# Patient Record
Sex: FEMALE | Race: WHITE | NOT HISPANIC OR LATINO | Employment: FULL TIME
[De-identification: names, ages, dates, MRNs, and addresses within clinical notes are randomized per-mention and may not be internally consistent; named-entity substitution may affect disease eponyms.]

---

## 2017-03-02 ENCOUNTER — LAB SERVICES (OUTPATIENT)
Dept: OTHER | Age: 67
End: 2017-03-02

## 2017-03-02 ENCOUNTER — CHARTING TRANS (OUTPATIENT)
Dept: OTHER | Age: 67
End: 2017-03-02

## 2017-03-05 ENCOUNTER — CHARTING TRANS (OUTPATIENT)
Dept: OTHER | Age: 67
End: 2017-03-05

## 2017-03-06 LAB — SKIN TEST, TB IN-OFFICE: NEGATIVE

## 2017-03-09 ENCOUNTER — CHARTING TRANS (OUTPATIENT)
Dept: OTHER | Age: 67
End: 2017-03-09

## 2017-03-12 ENCOUNTER — CHARTING TRANS (OUTPATIENT)
Dept: OTHER | Age: 67
End: 2017-03-12

## 2017-03-12 ENCOUNTER — LAB SERVICES (OUTPATIENT)
Dept: OTHER | Age: 67
End: 2017-03-12

## 2017-03-12 LAB — SKIN TEST, TB IN-OFFICE: NEGATIVE

## 2017-09-14 ENCOUNTER — HOSPITAL ENCOUNTER (OUTPATIENT)
Age: 67
Discharge: HOME | End: 2017-09-14
Attending: EMERGENCY MEDICINE
Payer: COMMERCIAL

## 2017-09-14 VITALS
OXYGEN SATURATION: 97 % | DIASTOLIC BLOOD PRESSURE: 67 MMHG | HEART RATE: 80 BPM | SYSTOLIC BLOOD PRESSURE: 137 MMHG | TEMPERATURE: 98 F | RESPIRATION RATE: 16 BRPM

## 2017-09-14 DIAGNOSIS — J06.9 VIRAL URI: Primary | ICD-10-CM

## 2017-09-14 PROCEDURE — 99214 OFFICE O/P EST MOD 30 MIN: CPT

## 2017-09-14 PROCEDURE — 99213 OFFICE O/P EST LOW 20 MIN: CPT

## 2017-09-14 RX ORDER — METHYLPREDNISOLONE 4 MG/1
TABLET ORAL
Qty: 1 PACKAGE | Refills: 0 | Status: SHIPPED | OUTPATIENT
Start: 2017-09-14 | End: 2018-04-09 | Stop reason: ALTCHOICE

## 2017-09-14 RX ORDER — FLUTICASONE PROPIONATE 50 MCG
2 SPRAY, SUSPENSION (ML) NASAL DAILY
Qty: 16 G | Refills: 0 | Status: SHIPPED | OUTPATIENT
Start: 2017-09-14 | End: 2017-10-14

## 2017-09-14 NOTE — ED PROVIDER NOTES
Patient presents with:  Cough/URI    HPI:     Zahida Reza is a 75year old female who presents with chief complaint of rhinorrhea, intermittent headache, scratchy voice. Started 8 days ago with sore throat, congestion, cough.   Symptoms have almost re and antihistamine to aid with intermittent rhinorrhea. Return for any worsening symptoms - fever. Assessment/Plan:     Diagnosis:  Viral URI    Plan:  1. Medrol dose pack and flonase Rx  2. Supportive care - APAP, antihistamine  3.   F/u with PCP in

## 2017-12-26 ENCOUNTER — HOSPITAL ENCOUNTER (OUTPATIENT)
Age: 67
Discharge: HOME | End: 2017-12-26
Payer: COMMERCIAL

## 2017-12-26 VITALS
WEIGHT: 185 LBS | HEIGHT: 65 IN | TEMPERATURE: 98 F | DIASTOLIC BLOOD PRESSURE: 83 MMHG | RESPIRATION RATE: 16 BRPM | HEART RATE: 87 BPM | BODY MASS INDEX: 30.82 KG/M2 | SYSTOLIC BLOOD PRESSURE: 163 MMHG | OXYGEN SATURATION: 98 %

## 2017-12-26 DIAGNOSIS — L25.9 CONTACT DERMATITIS, UNSPECIFIED CONTACT DERMATITIS TYPE, UNSPECIFIED TRIGGER: Primary | ICD-10-CM

## 2017-12-26 PROCEDURE — 99214 OFFICE O/P EST MOD 30 MIN: CPT

## 2017-12-26 PROCEDURE — 99213 OFFICE O/P EST LOW 20 MIN: CPT

## 2017-12-26 RX ORDER — PREDNISONE 20 MG/1
40 TABLET ORAL DAILY
Qty: 10 TABLET | Refills: 0 | Status: SHIPPED | OUTPATIENT
Start: 2017-12-26 | End: 2017-12-31

## 2017-12-26 NOTE — ED INITIAL ASSESSMENT (HPI)
Pt with c/o rash for past few weeks. Noticed to chest and back. Spreading to legs and head. C/o itching and burning. Using otc meds without relief.   Hx of shingles

## 2017-12-26 NOTE — ED PROVIDER NOTES
Patient Seen in: [facility]    History   Patient presents with:  Rash Skin Problem (integumentary)    Stated Complaint: Poss shingles     71-year-old female presents today with complaints of rash to the abdomen spreading to the back All other systems reviewed and negative except as noted above.     Physical Exam   ED Triage Vitals [12/26/17 1007]  BP: (!) 163/83  Pulse: 87  Resp: 16  Temp: (!) 97.5 °F (36.4 °C)  Temp src: Temporal  SpO2: 98 %  O2 Device: n/a    Current:BP (!) 163/83

## 2018-03-15 PROBLEM — R16.0 HEPATOMEGALY: Status: ACTIVE | Noted: 2018-03-15

## 2018-04-09 PROCEDURE — 36415 COLL VENOUS BLD VENIPUNCTURE: CPT | Performed by: ALLERGY & IMMUNOLOGY

## 2018-04-09 PROCEDURE — 82785 ASSAY OF IGE: CPT | Performed by: ALLERGY & IMMUNOLOGY

## 2018-04-09 PROCEDURE — 86003 ALLG SPEC IGE CRUDE XTRC EA: CPT | Performed by: ALLERGY & IMMUNOLOGY

## 2018-05-03 ENCOUNTER — CHARTING TRANS (OUTPATIENT)
Dept: OTHER | Age: 68
End: 2018-05-03

## 2018-07-02 ENCOUNTER — CHARTING TRANS (OUTPATIENT)
Dept: OTHER | Age: 68
End: 2018-07-02

## 2018-07-24 ENCOUNTER — CHARTING TRANS (OUTPATIENT)
Dept: OTHER | Age: 68
End: 2018-07-24

## 2018-09-25 PROCEDURE — 86803 HEPATITIS C AB TEST: CPT | Performed by: INTERNAL MEDICINE

## 2018-09-25 PROCEDURE — 81003 URINALYSIS AUTO W/O SCOPE: CPT | Performed by: INTERNAL MEDICINE

## 2018-09-25 PROCEDURE — 87389 HIV-1 AG W/HIV-1&-2 AB AG IA: CPT | Performed by: INTERNAL MEDICINE

## 2018-11-28 ENCOUNTER — HOSPITAL ENCOUNTER (OUTPATIENT)
Age: 68
Discharge: HOME | End: 2018-11-28
Attending: FAMILY MEDICINE
Payer: COMMERCIAL

## 2018-11-28 VITALS
TEMPERATURE: 99 F | HEART RATE: 100 BPM | OXYGEN SATURATION: 96 % | DIASTOLIC BLOOD PRESSURE: 75 MMHG | RESPIRATION RATE: 16 BRPM | SYSTOLIC BLOOD PRESSURE: 137 MMHG

## 2018-11-28 DIAGNOSIS — J06.9 VIRAL UPPER RESPIRATORY TRACT INFECTION: Primary | ICD-10-CM

## 2018-11-28 PROCEDURE — 99213 OFFICE O/P EST LOW 20 MIN: CPT

## 2018-11-28 PROCEDURE — 99214 OFFICE O/P EST MOD 30 MIN: CPT

## 2018-11-28 RX ORDER — BENZONATATE 200 MG/1
200 CAPSULE ORAL 3 TIMES DAILY PRN
Qty: 30 CAPSULE | Refills: 0 | Status: SHIPPED | OUTPATIENT
Start: 2018-11-28 | End: 2018-12-08

## 2018-11-28 RX ORDER — MOMETASONE 50 UG/1
SPRAY, METERED NASAL
Qty: 17 G | Refills: 0 | Status: SHIPPED | OUTPATIENT
Start: 2018-11-28 | End: 2018-12-17

## 2018-11-28 NOTE — ED INITIAL ASSESSMENT (HPI)
Pt sts yesterday began with sneezing, runny nose, very tired. Has not checked temperature but has been feeling hot/cold. Hx of recent n/v/d/body aches 4 days ago.

## 2018-11-28 NOTE — ED PROVIDER NOTES
Patient Seen in: [address] [facility]    History   Patient presents with:  Sinus Problem    Stated Complaint: sinus pain and pressure    HPI    This 27-year-old female presents to the office with complaint of worsening sinus pain and pressure, Temp 98.6 °F (37 °C) (Temporal)   Resp 16   SpO2 96%         Physical Exam    General: WH/WN/WD, in no respiratory distress, A and O times 3  HEAD: Normocephalic, atraumatic  EYES: Sclera anicteric,  conjunctiva normal.  EARS: Tympanic membranes normal, EA once daily after shower. Qty: 17 g Refills: 0    benzonatate 200 MG Oral Cap  Take 1 capsule (200 mg total) by mouth 3 (three) times daily as needed for cough.   Qty: 30 capsule Refills: 0        Use the Nasonex 1 spray to each nostril once daily after tyron

## 2018-12-17 ENCOUNTER — OFFICE VISIT (OUTPATIENT)
Dept: INTERNAL MEDICINE CLINIC | Facility: CLINIC | Age: 68
End: 2018-12-17
Payer: COMMERCIAL

## 2018-12-17 VITALS
WEIGHT: 198 LBS | DIASTOLIC BLOOD PRESSURE: 80 MMHG | BODY MASS INDEX: 32.99 KG/M2 | HEIGHT: 65 IN | RESPIRATION RATE: 16 BRPM | SYSTOLIC BLOOD PRESSURE: 136 MMHG | HEART RATE: 96 BPM

## 2018-12-17 DIAGNOSIS — E66.1 CLASS 1 DRUG-INDUCED OBESITY WITHOUT SERIOUS COMORBIDITY WITH BODY MASS INDEX (BMI) OF 32.0 TO 32.9 IN ADULT: ICD-10-CM

## 2018-12-17 DIAGNOSIS — I10 ESSENTIAL HYPERTENSION, BENIGN: ICD-10-CM

## 2018-12-17 DIAGNOSIS — Z51.81 ENCOUNTER FOR THERAPEUTIC DRUG MONITORING: Primary | ICD-10-CM

## 2018-12-17 DIAGNOSIS — E55.9 VITAMIN D DEFICIENCY: ICD-10-CM

## 2018-12-17 DIAGNOSIS — E53.8 VITAMIN B12 DEFICIENCY: ICD-10-CM

## 2018-12-17 DIAGNOSIS — M17.0 PRIMARY OSTEOARTHRITIS OF BOTH KNEES: ICD-10-CM

## 2018-12-17 PROBLEM — E66.811 CLASS 1 DRUG-INDUCED OBESITY WITHOUT SERIOUS COMORBIDITY WITH BODY MASS INDEX (BMI) OF 32.0 TO 32.9 IN ADULT: Status: ACTIVE | Noted: 2018-12-17

## 2018-12-17 PROCEDURE — 99204 OFFICE O/P NEW MOD 45 MIN: CPT | Performed by: NURSE PRACTITIONER

## 2018-12-17 PROCEDURE — 96372 THER/PROPH/DIAG INJ SC/IM: CPT | Performed by: NURSE PRACTITIONER

## 2018-12-17 RX ORDER — FLUOROURACIL 50 MG/G
CREAM TOPICAL
COMMUNITY
Start: 2018-07-09 | End: 2019-03-11

## 2018-12-17 RX ORDER — PHENTERMINE HYDROCHLORIDE 15 MG/1
15 CAPSULE ORAL EVERY MORNING
Qty: 30 CAPSULE | Refills: 0 | Status: SHIPPED | OUTPATIENT
Start: 2018-12-17 | End: 2019-01-14

## 2018-12-17 RX ORDER — CYANOCOBALAMIN 1000 UG/ML
1000 INJECTION INTRAMUSCULAR; SUBCUTANEOUS ONCE
Status: COMPLETED | OUTPATIENT
Start: 2018-12-17 | End: 2018-12-17

## 2018-12-17 RX ADMIN — CYANOCOBALAMIN 1000 MCG: 1000 INJECTION INTRAMUSCULAR; SUBCUTANEOUS at 15:30:00

## 2018-12-17 NOTE — PROGRESS NOTES
HISTORY OF PRESENT ILLNESS  Patient presents with:  Weight Problem: daughter is a patient. no weight loss meds.  slim fast and weight watchers in past. no regular exercise    Jovanny Maher is a 70year old female new to our office today for initiation of per night    MEDICAL HISTORY  PMH reviewed:   Cardiac disorders:HTN  Diabetes: negative  Thyroid disease: negative  Renal disease: negative   Kidney stones: negative   Liver disease: +hematomeglay  Joint-related conditions: +bilat.  Knee replacement  Migrai 05/16/2013     09/25/2018    K 4.5 09/25/2018     09/25/2018    CO2 25.7 09/25/2018     No results found for: EAG, A1C  Lab Results   Component Value Date    CHOLEST 209 (H) 09/25/2018    TRIG 63 09/25/2018    HDL 68 09/25/2018     09/25 198 lbs  5% Goal: 9.9  10% Goal: 19.8  Total Weight Loss: 0 lbs  Initial consult: 198  lbs on 12/17/2018, Down  Lb:  lbs total     Reviewed  [facility] patient contract. Readiness for Lifestyle change: 10/10, Interest in Medication: 10/10, Surgery interest: 0/10. in weight management  · Weight Loss Contract reviewed and signed today 12/16/2018    Labs ordered today: a1c    Patient Instructions   PLAN:  Continue with medications: phentermine 15mg  Follow up with me in 1 month  Schedule follow up appointments:  Meche Campbell home!   3. \"Calm\" or \"Headspace\" which helps with mindfulness, meditation, clarity, sleep, and chichi to your daily life. Return in about 4 weeks (around 1/14/2019) for weight management. Patient verbalizes understanding.     Carrie Walls,

## 2018-12-17 NOTE — PATIENT INSTRUCTIONS
PLAN:  Continue with medications: phentermine 15mg  Follow up with me in 1 month  Schedule follow up appointments:  Dietitian/nutritionist      Please try to work on the following dietary changes:  1.  Drink lots of water and cut down on soda/juice consumpt

## 2018-12-26 ENCOUNTER — OFFICE VISIT (OUTPATIENT)
Dept: INTERNAL MEDICINE CLINIC | Facility: CLINIC | Age: 68
End: 2018-12-26
Payer: COMMERCIAL

## 2018-12-26 VITALS — BODY MASS INDEX: 32.99 KG/M2 | HEIGHT: 65 IN | WEIGHT: 198 LBS

## 2018-12-26 DIAGNOSIS — E66.09 CLASS 1 OBESITY DUE TO EXCESS CALORIES WITH BODY MASS INDEX (BMI) OF 32.0 TO 32.9 IN ADULT, UNSPECIFIED WHETHER SERIOUS COMORBIDITY PRESENT: Primary | ICD-10-CM

## 2018-12-26 PROCEDURE — 97802 MEDICAL NUTRITION INDIV IN: CPT | Performed by: DIETITIAN, REGISTERED

## 2018-12-26 NOTE — PROGRESS NOTES
INITIAL OUTPATIENT NUTRITION CONSULTATION    Nutrition Assessment    Medical Diagnosis: Obesity    Physical Findings: n/a - patient reports history of bilateral knee replacement surgery and hopes weight loss will help reduce joint pressure    [facility] facility-administered medications on file prior to visit.      Labs:   No components found for: HGBA1C  Triglycerides   Date Value Ref Range Status   09/25/2018 63 <150 mg/dL Final     LDL Cholesterol Calc   Date Value Ref Range Status   05/16/2013 105 (H) doesn't get home until 7 pm, has goal to cook more (likes fish/sirloin and corn/peas - has goal to include more non-starchy vegetables)  Snacks: pretzels, chips   Beverages: water, unsweetened green tea     Meal pattern: 3 meals/d, 1 snacks/d    Number of

## 2019-01-08 ENCOUNTER — HOSPITAL ENCOUNTER (OUTPATIENT)
Age: 69
Discharge: HOME | End: 2019-01-08
Payer: COMMERCIAL

## 2019-01-08 VITALS
TEMPERATURE: 98 F | DIASTOLIC BLOOD PRESSURE: 67 MMHG | SYSTOLIC BLOOD PRESSURE: 155 MMHG | OXYGEN SATURATION: 98 % | BODY MASS INDEX: 32 KG/M2 | HEART RATE: 95 BPM | RESPIRATION RATE: 16 BRPM | WEIGHT: 195 LBS

## 2019-01-08 DIAGNOSIS — J30.9 ALLERGIC RHINITIS, UNSPECIFIED SEASONALITY, UNSPECIFIED TRIGGER: ICD-10-CM

## 2019-01-08 DIAGNOSIS — H65.93 FLUID LEVEL BEHIND TYMPANIC MEMBRANE OF BOTH EARS: ICD-10-CM

## 2019-01-08 DIAGNOSIS — J01.40 ACUTE PANSINUSITIS, RECURRENCE NOT SPECIFIED: Primary | ICD-10-CM

## 2019-01-08 PROCEDURE — 99213 OFFICE O/P EST LOW 20 MIN: CPT

## 2019-01-08 PROCEDURE — 99214 OFFICE O/P EST MOD 30 MIN: CPT

## 2019-01-08 RX ORDER — ACETAMINOPHEN 500 MG
1000 TABLET ORAL ONCE
Status: COMPLETED | OUTPATIENT
Start: 2019-01-08 | End: 2019-01-08

## 2019-01-08 RX ORDER — DOXYCYCLINE HYCLATE 100 MG/1
100 CAPSULE ORAL 2 TIMES DAILY
Qty: 20 CAPSULE | Refills: 0 | Status: SHIPPED | OUTPATIENT
Start: 2019-01-08 | End: 2019-01-18

## 2019-01-08 RX ORDER — METHYLPREDNISOLONE 4 MG/1
TABLET ORAL
Qty: 1 PACKAGE | Refills: 0 | Status: SHIPPED | OUTPATIENT
Start: 2019-01-08 | End: 2019-01-14

## 2019-01-08 NOTE — ED PROVIDER NOTES
Patient Seen in: [address] [facility]    History   Patient presents with:  Cough/URI    Stated Complaint: sinus pressure and pain    HPI    27-year-old female here with complaint of an approximate 3-day history of sinus pain and pressure with SpO2 98 %   O2 Device None (Room air)       Current:/67   Pulse 95   Temp 98.4 °F (36.9 °C) (Temporal)   Resp 16   Wt 88.5 kg   SpO2 98%   BMI 32.45 kg/m²         Physical Exam   Constitutional: She is oriented to person, place, and time.  She appea pansinusitis, recurrence not specified  (primary encounter diagnosis)  Allergic rhinitis, unspecified seasonality, unspecified trigger  Fluid level behind tympanic membrane of both ears    Disposition:  Discharge  1/8/2019 11:58 am    Follow-up:  [facility]

## 2019-01-08 NOTE — ED INITIAL ASSESSMENT (HPI)
Patient started Sunday during the night with nasal congestion and her nose running constantly. She has sinus pain and ear pressure. Her nose hurts. No fevers. Her nose will not stop running.

## 2019-01-11 ENCOUNTER — LAB ENCOUNTER (OUTPATIENT)
Dept: LAB | Age: 69
End: 2019-01-11
Attending: INTERNAL MEDICINE
Payer: COMMERCIAL

## 2019-01-11 DIAGNOSIS — E66.1 CLASS 1 DRUG-INDUCED OBESITY WITHOUT SERIOUS COMORBIDITY WITH BODY MASS INDEX (BMI) OF 32.0 TO 32.9 IN ADULT: ICD-10-CM

## 2019-01-11 DIAGNOSIS — Z51.81 ENCOUNTER FOR THERAPEUTIC DRUG MONITORING: ICD-10-CM

## 2019-01-11 LAB
EST. AVERAGE GLUCOSE BLD GHB EST-MCNC: 111 MG/DL (ref 68–126)
HBA1C MFR BLD HPLC: 5.5 % (ref ?–5.7)

## 2019-01-11 PROCEDURE — 83036 HEMOGLOBIN GLYCOSYLATED A1C: CPT | Performed by: NURSE PRACTITIONER

## 2019-01-13 NOTE — PROGRESS NOTES
HISTORY OF PRESENT ILLNESS  Patient presents with:  Weight Check  Injection: b12 #2    Nehemias Lagos is a 70year old female here for follow up with medical weight loss program for the treatment of overweight, obesity, or morbid obesity.  Patient has los none  Coffee/Tea: 3 cups of green tea     Recently started to make more foods    Sweet/ Salty tooth: salty   Portion: medium  Eats 3 meals per day: no (will sometimes skip breakfast and lunch)  Number of restaurant or fast food meals/week: 0-1 meals/week 09/25/2018    CA 9.3 09/25/2018    ALKPHO 84 09/25/2018    AST 33 09/25/2018    ALT 48 09/25/2018    BILT 0.50 09/25/2018    TP 7.2 09/25/2018    ALB 4.0 09/25/2018    AGRATIO 1.7 05/16/2013     09/25/2018    K 4.5 09/25/2018     09/25/2018 Weight: 198 lbs  Initial Weight Date: 12/17/18  Today's Weight: 196 lbs  5% Goal: 9.9  10% Goal: 19.8  Total Weight Loss: 2 lbs  Initial consult: 198  lbs on 12/17/2018, Down 2 Lb:  2 lbs total     Reviewed  [facility] patient contract.  Readiness for Lifestyle ch work!!     PLAN:  Continue with medications: phentermine 15mg,   Vitamin b12 injection   Follow up with me in 1 month  Schedule follow up appointments:  Dietitian/nutritionist      Please try to work on the following dietary changes:  1.  Drink lots of wate to your daily life. Return in about 4 weeks (around 2/11/2019). Patient verbalizes understanding.     Taye Coronado, APRN

## 2019-01-14 ENCOUNTER — OFFICE VISIT (OUTPATIENT)
Dept: INTERNAL MEDICINE CLINIC | Facility: CLINIC | Age: 69
End: 2019-01-14
Payer: COMMERCIAL

## 2019-01-14 VITALS
BODY MASS INDEX: 32.65 KG/M2 | WEIGHT: 196 LBS | SYSTOLIC BLOOD PRESSURE: 130 MMHG | HEART RATE: 100 BPM | HEIGHT: 65 IN | DIASTOLIC BLOOD PRESSURE: 82 MMHG | RESPIRATION RATE: 16 BRPM

## 2019-01-14 DIAGNOSIS — Z51.81 ENCOUNTER FOR THERAPEUTIC DRUG MONITORING: Primary | ICD-10-CM

## 2019-01-14 DIAGNOSIS — E66.1 CLASS 1 DRUG-INDUCED OBESITY WITHOUT SERIOUS COMORBIDITY WITH BODY MASS INDEX (BMI) OF 32.0 TO 32.9 IN ADULT: ICD-10-CM

## 2019-01-14 DIAGNOSIS — I10 ESSENTIAL HYPERTENSION, BENIGN: ICD-10-CM

## 2019-01-14 DIAGNOSIS — E53.8 VITAMIN B12 DEFICIENCY: ICD-10-CM

## 2019-01-14 DIAGNOSIS — E55.9 VITAMIN D DEFICIENCY: ICD-10-CM

## 2019-01-14 PROCEDURE — 99214 OFFICE O/P EST MOD 30 MIN: CPT | Performed by: NURSE PRACTITIONER

## 2019-01-14 PROCEDURE — 96372 THER/PROPH/DIAG INJ SC/IM: CPT | Performed by: NURSE PRACTITIONER

## 2019-01-14 RX ORDER — PHENTERMINE HYDROCHLORIDE 15 MG/1
15 CAPSULE ORAL EVERY MORNING
Qty: 30 CAPSULE | Refills: 0 | Status: SHIPPED | OUTPATIENT
Start: 2019-01-14 | End: 2019-02-11

## 2019-01-14 RX ORDER — CYANOCOBALAMIN 1000 UG/ML
1000 INJECTION INTRAMUSCULAR; SUBCUTANEOUS ONCE
Status: COMPLETED | OUTPATIENT
Start: 2019-01-14 | End: 2019-01-14

## 2019-01-14 RX ADMIN — CYANOCOBALAMIN 1000 MCG: 1000 INJECTION INTRAMUSCULAR; SUBCUTANEOUS at 10:07:00

## 2019-01-14 NOTE — PATIENT INSTRUCTIONS
Keep up the great work! !     PLAN:  Continue with medications: phentermine 15mg  Vitamin b12 injection   Follow up with me in 1 month  Schedule follow up appointments:  Dietitian/nutritionist      Please try to work on the following dietary changes:  1.   sleep, and chichi to your daily life.

## 2019-02-11 ENCOUNTER — OFFICE VISIT (OUTPATIENT)
Dept: INTERNAL MEDICINE CLINIC | Facility: CLINIC | Age: 69
End: 2019-02-11
Payer: COMMERCIAL

## 2019-02-11 VITALS
SYSTOLIC BLOOD PRESSURE: 130 MMHG | HEART RATE: 110 BPM | WEIGHT: 193 LBS | RESPIRATION RATE: 16 BRPM | BODY MASS INDEX: 32.15 KG/M2 | DIASTOLIC BLOOD PRESSURE: 84 MMHG | HEIGHT: 65 IN

## 2019-02-11 DIAGNOSIS — E53.8 VITAMIN B12 DEFICIENCY: ICD-10-CM

## 2019-02-11 DIAGNOSIS — E66.1 CLASS 1 DRUG-INDUCED OBESITY WITHOUT SERIOUS COMORBIDITY WITH BODY MASS INDEX (BMI) OF 32.0 TO 32.9 IN ADULT: ICD-10-CM

## 2019-02-11 DIAGNOSIS — E55.9 VITAMIN D DEFICIENCY: ICD-10-CM

## 2019-02-11 DIAGNOSIS — I10 ESSENTIAL HYPERTENSION, BENIGN: ICD-10-CM

## 2019-02-11 DIAGNOSIS — Z51.81 ENCOUNTER FOR THERAPEUTIC DRUG MONITORING: Primary | ICD-10-CM

## 2019-02-11 PROCEDURE — 99213 OFFICE O/P EST LOW 20 MIN: CPT | Performed by: NURSE PRACTITIONER

## 2019-02-11 PROCEDURE — 96372 THER/PROPH/DIAG INJ SC/IM: CPT | Performed by: NURSE PRACTITIONER

## 2019-02-11 RX ORDER — PHENTERMINE HYDROCHLORIDE 37.5 MG/1
37.5 TABLET ORAL
Qty: 30 TABLET | Refills: 0 | Status: SHIPPED | OUTPATIENT
Start: 2019-02-11 | End: 2019-03-11

## 2019-02-11 RX ORDER — LACTOBACIL 2/BIFIDO 1/S.THERMO 450B CELL
PACKET (EA) ORAL
COMMUNITY
End: 2019-05-08

## 2019-02-11 RX ORDER — CYANOCOBALAMIN 1000 UG/ML
1000 INJECTION INTRAMUSCULAR; SUBCUTANEOUS ONCE
Status: COMPLETED | OUTPATIENT
Start: 2019-02-11 | End: 2019-02-11

## 2019-02-11 RX ORDER — PHENTERMINE HYDROCHLORIDE 15 MG/1
15 CAPSULE ORAL EVERY MORNING
Qty: 30 CAPSULE | Refills: 0 | Status: CANCELLED | OUTPATIENT
Start: 2019-02-11

## 2019-02-11 RX ADMIN — CYANOCOBALAMIN 1000 MCG: 1000 INJECTION INTRAMUSCULAR; SUBCUTANEOUS at 10:40:00

## 2019-02-11 NOTE — PATIENT INSTRUCTIONS
Keep up the great work! !     PLAN:  Continue with medications: phentermine 37.5mg (1/2 tablet in half)   b12 injection today  Follow up with me in 1 month  Schedule follow up appointments:  Dietitian/nutritionist      Please try to work on the following [facility] meditation, clarity, sleep, and chichi to your daily life.

## 2019-02-11 NOTE — PROGRESS NOTES
HISTORY OF PRESENT ILLNESS  Patient presents with:  Weight Check: lost 3 pounds  Injection: b12#3    Marina Shin is a 70year old female here for follow up with medical weight loss program for the treatment of overweight, obesity, or morbid obesity.  P out   Juice: none  Coffee/Tea: 3 cups of green tea     Recently started to make more foods    Sweet/ Salty tooth: salty   Portion: medium  Eats 3 meals per day: no (will sometimes skip breakfast and lunch)  Number of restaurant or fast food meals/week: 0-1 0.85 09/25/2018    CA 9.3 09/25/2018    ALKPHO 84 09/25/2018    AST 33 09/25/2018    ALT 48 09/25/2018    BILT 0.50 09/25/2018    TP 7.2 09/25/2018    ALB 4.0 09/25/2018    AGRATIO 1.7 05/16/2013     09/25/2018    K 4.5 09/25/2018     09/25/201 consult: 198  lbs on 12/17/2018, Down 2 Lb:  5 lbs total     Reviewed  [facility] patient contract. Readiness for Lifestyle change: 10/10, Interest in Medication: 10/10, Surgery interest: 0/10.     Counseled on comprehensive weight loss plan including attention to in half)   b12 injection today  Follow up with me in 1 month  Schedule follow up appointments:  Dietitian/nutritionist      Please try to work on the following dietary changes:  1.  Drink lots of water and cut down on soda/juice consumption if soda/juice  (around 3/11/2019). Patient verbalizes understanding.     LUISA Duarte

## 2019-02-18 ENCOUNTER — TELEPHONE (OUTPATIENT)
Dept: INTERNAL MEDICINE CLINIC | Facility: CLINIC | Age: 69
End: 2019-02-18

## 2019-02-18 NOTE — TELEPHONE ENCOUNTER
Received a request to do a PA for Phentermine which is not done. Left on hold with pharmacy for a long time without response.   Sent My chart to patient

## 2019-03-07 ENCOUNTER — HOSPITAL ENCOUNTER (OUTPATIENT)
Dept: GENERAL RADIOLOGY | Age: 69
Discharge: HOME | End: 2019-03-07
Attending: SPECIALIST
Payer: COMMERCIAL

## 2019-03-07 DIAGNOSIS — M19.049 ARTHRITIS OF HAND: ICD-10-CM

## 2019-03-07 PROCEDURE — 73130 X-RAY EXAM OF HAND: CPT | Performed by: SPECIALIST

## 2019-03-11 ENCOUNTER — OFFICE VISIT (OUTPATIENT)
Dept: INTERNAL MEDICINE CLINIC | Facility: CLINIC | Age: 69
End: 2019-03-11
Payer: COMMERCIAL

## 2019-03-11 VITALS
DIASTOLIC BLOOD PRESSURE: 76 MMHG | HEART RATE: 100 BPM | SYSTOLIC BLOOD PRESSURE: 122 MMHG | WEIGHT: 189 LBS | BODY MASS INDEX: 31.49 KG/M2 | HEIGHT: 65 IN

## 2019-03-11 DIAGNOSIS — Z51.81 ENCOUNTER FOR THERAPEUTIC DRUG MONITORING: Primary | ICD-10-CM

## 2019-03-11 DIAGNOSIS — E55.9 VITAMIN D DEFICIENCY: ICD-10-CM

## 2019-03-11 DIAGNOSIS — E53.8 VITAMIN B12 DEFICIENCY: ICD-10-CM

## 2019-03-11 DIAGNOSIS — I10 ESSENTIAL HYPERTENSION, BENIGN: ICD-10-CM

## 2019-03-11 DIAGNOSIS — E66.09 CLASS 1 OBESITY DUE TO EXCESS CALORIES WITH BODY MASS INDEX (BMI) OF 32.0 TO 32.9 IN ADULT, UNSPECIFIED WHETHER SERIOUS COMORBIDITY PRESENT: ICD-10-CM

## 2019-03-11 PROCEDURE — 99214 OFFICE O/P EST MOD 30 MIN: CPT | Performed by: NURSE PRACTITIONER

## 2019-03-11 PROCEDURE — 96372 THER/PROPH/DIAG INJ SC/IM: CPT | Performed by: NURSE PRACTITIONER

## 2019-03-11 RX ORDER — AMLODIPINE BESYLATE 5 MG/1
5 TABLET ORAL
COMMUNITY
End: 2019-05-08

## 2019-03-11 RX ORDER — CYANOCOBALAMIN 1000 UG/ML
1000 INJECTION INTRAMUSCULAR; SUBCUTANEOUS ONCE
Status: COMPLETED | OUTPATIENT
Start: 2019-03-11 | End: 2019-03-11

## 2019-03-11 RX ORDER — PHENTERMINE HYDROCHLORIDE 37.5 MG/1
37.5 TABLET ORAL
Qty: 30 TABLET | Refills: 0 | Status: SHIPPED | OUTPATIENT
Start: 2019-03-11 | End: 2019-05-09

## 2019-03-11 RX ADMIN — CYANOCOBALAMIN 1000 MCG: 1000 INJECTION INTRAMUSCULAR; SUBCUTANEOUS at 09:43:00

## 2019-03-11 NOTE — PROGRESS NOTES
HISTORY OF PRESENT ILLNESS  Patient presents with:  Weight Check: lost 4 pounds  Injection: b12 #4    Torie Coreas is a 70year old female here for follow up with medical weight loss program for the treatment of overweight, obesity, or morbid obesity. cut out   Juice: none  Coffee/Tea: 3 cups of green tea     Recently started to make more foods    Sweet/ Salty tooth: salty   Portion: medium  Eats 3 meals per day: no (will sometimes skip breakfast and lunch)  Number of restaurant or fast food meals/week: 09/25/2018    CA 9.3 09/25/2018    ALKPHO 84 09/25/2018    AST 33 09/25/2018    ALT 48 09/25/2018    BILT 0.50 09/25/2018    TP 7.2 09/25/2018    ALB 4.0 09/25/2018    AGRATIO 1.7 05/16/2013     09/25/2018    K 4.5 09/25/2018     09/25/2018 consult: 198  lbs on 12/17/2018, Down 4 Lb:  9 lbs total     Reviewed  [facility] patient contract. Readiness for Lifestyle change: 10/10, Interest in Medication: 10/10, Surgery interest: 0/10.     Counseled on comprehensive weight loss plan including attention to Phentermine 15mg   Vitamin b12 injection  Follow up with me in 1 month  Schedule follow up appointments:  Dietitian/nutritionist      Please try to work on the following dietary changes:  1.  Drink lots of water and cut down on soda/juice consumption if sod weeks (around 4/8/2019) for weight management. Patient verbalizes understanding.     LIUSA Hernandez

## 2019-03-11 NOTE — PATIENT INSTRUCTIONS
Keep up the great work!!  # 9 lbs of weight loss!     PLAN:  Continue with medications:  Phentermine 15mg   Vitamin b12 injection  Follow up with me in 1 month  Schedule follow up appointments:  Dietitian/nutritionist      Please try to work on the followi mindfulness, meditation, clarity, sleep, and chichi to your daily life.

## 2019-04-06 ENCOUNTER — OFFICE VISIT (OUTPATIENT)
Dept: INTERNAL MEDICINE CLINIC | Facility: CLINIC | Age: 69
End: 2019-04-06
Payer: COMMERCIAL

## 2019-04-06 DIAGNOSIS — I10 ESSENTIAL HYPERTENSION: ICD-10-CM

## 2019-04-06 DIAGNOSIS — E66.9 OBESITY (BMI 30.0-34.9): Primary | ICD-10-CM

## 2019-04-06 PROCEDURE — 97803 MED NUTRITION INDIV SUBSEQ: CPT | Performed by: DIETITIAN, REGISTERED

## 2019-04-06 NOTE — PROGRESS NOTES
FOLLOW UP NUTRITION CONSULTATION    Nutrition Assessment    Number of consultations with dietitian: 2    Height:  Ht Readings from Last 1 Encounters:  03/11/19 : 65\"      Weight:   Wt Readings from Last 2 Encounters:  03/11/19 : 189 lb  02/11/19 : 193 daily    Monitoring/Evaluation: Patient scheduled to return to Weight Loss Clinic.   Follow up with any [facility] dietitian         Katie Espinosa MS, RD, LDN

## 2019-04-09 ENCOUNTER — OFFICE VISIT (OUTPATIENT)
Dept: INTERNAL MEDICINE CLINIC | Facility: CLINIC | Age: 69
End: 2019-04-09
Payer: COMMERCIAL

## 2019-04-09 VITALS
WEIGHT: 189 LBS | SYSTOLIC BLOOD PRESSURE: 136 MMHG | BODY MASS INDEX: 31.49 KG/M2 | RESPIRATION RATE: 16 BRPM | DIASTOLIC BLOOD PRESSURE: 76 MMHG | HEART RATE: 80 BPM | HEIGHT: 65 IN

## 2019-04-09 DIAGNOSIS — E55.9 VITAMIN D DEFICIENCY: ICD-10-CM

## 2019-04-09 DIAGNOSIS — I10 ESSENTIAL HYPERTENSION: ICD-10-CM

## 2019-04-09 DIAGNOSIS — E53.8 VITAMIN B12 DEFICIENCY: ICD-10-CM

## 2019-04-09 DIAGNOSIS — Z51.81 ENCOUNTER FOR THERAPEUTIC DRUG MONITORING: Primary | ICD-10-CM

## 2019-04-09 DIAGNOSIS — E66.9 OBESITY (BMI 30.0-34.9): ICD-10-CM

## 2019-04-09 PROCEDURE — 99214 OFFICE O/P EST MOD 30 MIN: CPT | Performed by: NURSE PRACTITIONER

## 2019-04-09 PROCEDURE — 96372 THER/PROPH/DIAG INJ SC/IM: CPT | Performed by: NURSE PRACTITIONER

## 2019-04-09 RX ORDER — TOPIRAMATE 25 MG/1
25 TABLET ORAL 2 TIMES DAILY
Qty: 60 TABLET | Refills: 2 | Status: SHIPPED | OUTPATIENT
Start: 2019-04-09 | End: 2019-07-06

## 2019-04-09 RX ORDER — PHENTERMINE HYDROCHLORIDE 37.5 MG/1
37.5 TABLET ORAL
Qty: 30 TABLET | Refills: 0 | Status: CANCELLED | OUTPATIENT
Start: 2019-04-09

## 2019-04-09 RX ORDER — CYANOCOBALAMIN 1000 UG/ML
1000 INJECTION INTRAMUSCULAR; SUBCUTANEOUS ONCE
Status: COMPLETED | OUTPATIENT
Start: 2019-04-09 | End: 2019-04-09

## 2019-04-09 RX ADMIN — CYANOCOBALAMIN 1000 MCG: 1000 INJECTION INTRAMUSCULAR; SUBCUTANEOUS at 09:09:00

## 2019-04-09 NOTE — PROGRESS NOTES
HISTORY OF PRESENT ILLNESS  Patient presents with:  Weight Check: no weight change    Samy Fraser is a 70year old female here for follow up with medical weight loss program for the treatment of overweight, obesity, or morbid obesity.  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  Discussed first goal of weight loss 5% in 3 months and 10% in 6 months    Abnormal labs: vitamin d deficiency,   EKG not done in office, was done at pcp office 3/2018  Muscle mass 14.4% -low and strength training recommended     PLAN   Recomme Dietitian/nutritionist      Please try to work on the following dietary changes:  1. Drink lots of water and cut down on soda/juice consumption if soda/juice drinker  2. Eat breakfast daily (within 1 hour)  3.  Focus on protein: (15-30 grams with each meal)

## 2019-04-09 NOTE — PATIENT INSTRUCTIONS
Keep up the great work! !     PLAN:  Continue with medications:  Phentermine 15mg, Topamax: take 1 tablet before dinner for the first week (to decrease side effects) and than the second week--> increase to 1 tablet in the morning and 1 tablet before dinner workout\" to help with exercise/activity which takes 7 minutes of your day and that you can do at home! 3. \"Calm\" or \"Headspace\" which helps with mindfulness, meditation, clarity, sleep, and chichi to your daily life.

## 2019-05-08 ENCOUNTER — OFFICE VISIT (OUTPATIENT)
Dept: RHEUMATOLOGY | Facility: CLINIC | Age: 69
End: 2019-05-08
Payer: COMMERCIAL

## 2019-05-08 ENCOUNTER — APPOINTMENT (OUTPATIENT)
Dept: LAB | Age: 69
End: 2019-05-08
Attending: INTERNAL MEDICINE
Payer: COMMERCIAL

## 2019-05-08 VITALS
DIASTOLIC BLOOD PRESSURE: 74 MMHG | WEIGHT: 184 LBS | HEART RATE: 84 BPM | BODY MASS INDEX: 31 KG/M2 | TEMPERATURE: 98 F | SYSTOLIC BLOOD PRESSURE: 132 MMHG | RESPIRATION RATE: 16 BRPM

## 2019-05-08 DIAGNOSIS — M79.641 RIGHT HAND PAIN: Primary | ICD-10-CM

## 2019-05-08 DIAGNOSIS — M79.644 PAIN OF RIGHT MIDDLE FINGER: ICD-10-CM

## 2019-05-08 DIAGNOSIS — M79.644 PAIN IN RIGHT FINGER(S): ICD-10-CM

## 2019-05-08 DIAGNOSIS — M79.641 RIGHT HAND PAIN: ICD-10-CM

## 2019-05-08 PROCEDURE — 86140 C-REACTIVE PROTEIN: CPT | Performed by: INTERNAL MEDICINE

## 2019-05-08 PROCEDURE — 86200 CCP ANTIBODY: CPT | Performed by: INTERNAL MEDICINE

## 2019-05-08 PROCEDURE — 85652 RBC SED RATE AUTOMATED: CPT | Performed by: INTERNAL MEDICINE

## 2019-05-08 PROCEDURE — 84550 ASSAY OF BLOOD/URIC ACID: CPT | Performed by: INTERNAL MEDICINE

## 2019-05-08 PROCEDURE — 99204 OFFICE O/P NEW MOD 45 MIN: CPT | Performed by: INTERNAL MEDICINE

## 2019-05-08 PROCEDURE — 86431 RHEUMATOID FACTOR QUANT: CPT | Performed by: INTERNAL MEDICINE

## 2019-05-08 NOTE — PROGRESS NOTES
?  RHEUMATOLOGY NEW PATIENT   Date of visit: 5/8/2019  ? Patient presents with:  Joint Pain: NP @ weight loss clinic for bilateral joint hand pain. Pt states 'in the winter hands started getting really deformed. Was told has really bad arthritis.' No fam HAND, RIGHT (CPT=73218); Future    Pain in right finger(s)  -     RHEUMATOID ARTHRITIS FACTOR; Future  -     CYCLIC CITRULLINATE PEP. IGG; Future  -     URIC ACID, SERUM; Future  -     C-REACTIVE PROTEIN; Future  -     SED RATE, CARLOSREN (AUTOMATED);  Fut recently with warmer weather. Morning stiffness present throughout her body and lasts for less than hour.  She states this improves with motion and stretching.     + recent hair thinning  + hx of dermatitis last year - dx with dermatitis and lichenoid k History:  Past Surgical History:   Procedure Laterality Date   • KNEE REPLACEMENT SURGERY      left knee replacement surgery in August 2015   • OTHER      cyst removal on left 3rd toe   • OTHER  2012,2013    both wrists reconstructed   • OTHER  2014,2015 for blurred vision and double vision. Respiratory: Negative for shortness of breath and wheezing. Cardiovascular: Negative for leg swelling. Gastrointestinal: Negative for heartburn and nausea. Genitourinary: Negative for frequency and urgency. appreciated. No swelling, tenderness, redness or restriction of motion of the DIPs, remaining PIPs, MCPs, wrists, elbows, ankles, or joints of the feet. SI joints non-tender. No spinous process tenderness. Slight thoracic kyphosis.    Bilateral knees pos (CPT=73130)     TECHNIQUE:  Three views were obtained. COMPARISON:  [facility], XR HAND (MIN 3 VIEWS), RIGHT (CPT=73130), 3/07/2019, 17:47.      INDICATIONS:  M19.049 Primary osteoarthritis, unspecified hand     PATIENT STATED HISTORY:

## 2019-05-09 ENCOUNTER — OFFICE VISIT (OUTPATIENT)
Dept: INTERNAL MEDICINE CLINIC | Facility: CLINIC | Age: 69
End: 2019-05-09
Payer: COMMERCIAL

## 2019-05-09 VITALS
DIASTOLIC BLOOD PRESSURE: 60 MMHG | RESPIRATION RATE: 14 BRPM | BODY MASS INDEX: 30.82 KG/M2 | WEIGHT: 185 LBS | HEIGHT: 65 IN | HEART RATE: 80 BPM | SYSTOLIC BLOOD PRESSURE: 120 MMHG

## 2019-05-09 DIAGNOSIS — E55.9 VITAMIN D DEFICIENCY: ICD-10-CM

## 2019-05-09 DIAGNOSIS — I10 ESSENTIAL HYPERTENSION: ICD-10-CM

## 2019-05-09 DIAGNOSIS — E53.8 VITAMIN B12 DEFICIENCY: ICD-10-CM

## 2019-05-09 DIAGNOSIS — Z51.81 ENCOUNTER FOR THERAPEUTIC DRUG MONITORING: Primary | ICD-10-CM

## 2019-05-09 DIAGNOSIS — E66.09 CLASS 1 OBESITY DUE TO EXCESS CALORIES WITH BODY MASS INDEX (BMI) OF 32.0 TO 32.9 IN ADULT, UNSPECIFIED WHETHER SERIOUS COMORBIDITY PRESENT: ICD-10-CM

## 2019-05-09 PROCEDURE — 99214 OFFICE O/P EST MOD 30 MIN: CPT | Performed by: NURSE PRACTITIONER

## 2019-05-09 PROCEDURE — 96372 THER/PROPH/DIAG INJ SC/IM: CPT | Performed by: NURSE PRACTITIONER

## 2019-05-09 RX ORDER — CYANOCOBALAMIN 1000 UG/ML
1000 INJECTION INTRAMUSCULAR; SUBCUTANEOUS ONCE
Status: COMPLETED | OUTPATIENT
Start: 2019-05-09 | End: 2019-05-09

## 2019-05-09 RX ORDER — PHENTERMINE HYDROCHLORIDE 37.5 MG/1
37.5 TABLET ORAL
Qty: 30 TABLET | Refills: 0 | Status: SHIPPED | OUTPATIENT
Start: 2019-05-09 | End: 2019-08-02

## 2019-05-09 RX ADMIN — CYANOCOBALAMIN 1000 MCG: 1000 INJECTION INTRAMUSCULAR; SUBCUTANEOUS at 09:10:00

## 2019-05-09 NOTE — PROGRESS NOTES
HISTORY OF PRESENT ILLNESS  Patient presents with:  Weight Check: down 4 needs b12 #6    Nelsy Cobian is a 70year old female here for follow up with medical weight loss program for the treatment of overweight, obesity, or morbid obesity.  Patient has l 3 cups of green tea     Recently started to make more foods    Sweet/ Salty tooth: salty   Portion: medium  Eats 3 meals per day: no (will sometimes skip breakfast and lunch)  Number of restaurant or fast food meals/week: 0-1 meals/week    Social hx and li 09/25/2018    ALKPHO 84 09/25/2018    AST 33 09/25/2018    ALT 48 09/25/2018    BILT 0.50 09/25/2018    TP 7.2 09/25/2018    ALB 4.0 09/25/2018    AGRATIO 1.7 05/16/2013     09/25/2018    K 4.5 09/25/2018     09/25/2018    CO2 25.7 09/25/2018 Interest in Medication: 10/10, Surgery interest: 0/10. Counseled on comprehensive weight loss plan including attention to nutrition, exercise and behavior/stress management for success.  Discussed first goal of weight loss 5% in 3 months and 10% in 6 mon 6/6/2019) for weight mangement. Patient verbalizes understanding.     Zenaida Wilkinson, APRN

## 2019-05-09 NOTE — PATIENT INSTRUCTIONS
Keep up the great work!!  #13 lbs of weight loss!     PLAN:  Continue with medications: phentermine 15mg, topamax  Vitamin b12 injection #6  Follow up with me in 1 month  Schedule follow up appointments:  Dietitian/nutritionist      Please try to work on t with mindfulness, meditation, clarity, sleep, and chichi to your daily life.

## 2019-05-28 ENCOUNTER — OFFICE VISIT (OUTPATIENT)
Dept: RHEUMATOLOGY | Facility: CLINIC | Age: 69
End: 2019-05-28
Payer: COMMERCIAL

## 2019-05-28 VITALS
DIASTOLIC BLOOD PRESSURE: 82 MMHG | HEART RATE: 80 BPM | SYSTOLIC BLOOD PRESSURE: 134 MMHG | WEIGHT: 185 LBS | RESPIRATION RATE: 16 BRPM | TEMPERATURE: 98 F | BODY MASS INDEX: 31 KG/M2

## 2019-05-28 DIAGNOSIS — M79.644 PAIN IN RIGHT FINGER(S): ICD-10-CM

## 2019-05-28 DIAGNOSIS — M79.644 PAIN OF RIGHT MIDDLE FINGER: ICD-10-CM

## 2019-05-28 DIAGNOSIS — M79.641 RIGHT HAND PAIN: ICD-10-CM

## 2019-05-28 DIAGNOSIS — M67.441 GANGLION CYST OF FINGER OF RIGHT HAND: Primary | ICD-10-CM

## 2019-05-28 PROCEDURE — 20612 ASPIRATE/INJ GANGLION CYST: CPT | Performed by: INTERNAL MEDICINE

## 2019-05-28 RX ORDER — LIDOCAINE HYDROCHLORIDE 10 MG/ML
1 INJECTION, SOLUTION EPIDURAL; INFILTRATION; INTRACAUDAL; PERINEURAL ONCE
Status: COMPLETED | OUTPATIENT
Start: 2019-05-28 | End: 2019-05-28

## 2019-05-28 RX ORDER — METHYLPREDNISOLONE ACETATE 40 MG/ML
20 INJECTION, SUSPENSION INTRA-ARTICULAR; INTRALESIONAL; INTRAMUSCULAR; SOFT TISSUE ONCE
Status: COMPLETED | OUTPATIENT
Start: 2019-05-28 | End: 2019-05-28

## 2019-05-28 RX ADMIN — LIDOCAINE HYDROCHLORIDE 1 ML: 10 INJECTION, SOLUTION EPIDURAL; INFILTRATION; INTRACAUDAL; PERINEURAL at 12:19:00

## 2019-05-28 RX ADMIN — METHYLPREDNISOLONE ACETATE 20 MG: 40 INJECTION, SUSPENSION INTRA-ARTICULAR; INTRALESIONAL; INTRAMUSCULAR; SOFT TISSUE at 12:20:00

## 2019-05-28 NOTE — PROGRESS NOTES
?  RHEUMATOLOGY Follow up   Date of visit: 5/28/2019  ? Patient presents with:  Finger Pain: c/o right middle and ring finger pain    ?   ASSESSMENT, DISCUSSION & PLAN   Assessment:  Ganglion cyst of finger of right hand  (primary encounter diagnosis)  R injection performed today. HPI from initial consultation  States years ago, she was following with a hand surgeon for persistent pain in her hands bilaterally. She had undergone replacement/surgeries in both of her wrists.  States prior to surgery, pain    No history of significant wrist pain or swelling, pain or swelling of the MCPs (with exception of chronic enlargement), or pain or swelling of the ankles.   The patient denies oral or nasal ulcers, photosensitive rash, Raynaud's phenomenon, prior hemat Pressure Mother    • Heart Disease Mother    • Heart Disorder Mother 48        angina   • Psychiatric Mother         DEMENTIA   • Cancer Father 64        gallbladder   • Arthritis Sister    • Hypertension Sister    • Arthritis Brother         x2 brothers oriented to person, place, and time. She appears well-developed and well-nourished. No distress. HENT:   Head: Normocephalic.    Right Ear: External ear normal.   Left Ear: External ear normal.   Nose: Nose normal.   Mouth/Throat: Oropharynx is clear and fibrocartilage complex is seen multiple prominent lately at its ulnar attachment. This is of uncertain significance and could represent postoperative or degenerative changes although partial tearing cannot be excluded.   Correlate with the patient's clinic

## 2019-05-28 NOTE — PROCEDURES
Ultrasound evaluation for Ganglion cyst     Procedure: Aspiration injection of ganglion cyst over right 3rd and 4th digits, over PIP joints. US Indication:  For relief of pain and proper insertion of needle into a ganglion cyst     After obtaining consent

## 2019-06-04 ENCOUNTER — LAB ENCOUNTER (OUTPATIENT)
Dept: LAB | Age: 69
End: 2019-06-04
Attending: NURSE PRACTITIONER
Payer: COMMERCIAL

## 2019-06-04 ENCOUNTER — OFFICE VISIT (OUTPATIENT)
Dept: INTERNAL MEDICINE CLINIC | Facility: CLINIC | Age: 69
End: 2019-06-04
Payer: COMMERCIAL

## 2019-06-04 VITALS
SYSTOLIC BLOOD PRESSURE: 120 MMHG | WEIGHT: 183 LBS | BODY MASS INDEX: 30.49 KG/M2 | RESPIRATION RATE: 16 BRPM | DIASTOLIC BLOOD PRESSURE: 68 MMHG | HEIGHT: 65 IN | HEART RATE: 84 BPM

## 2019-06-04 DIAGNOSIS — E53.8 VITAMIN B12 DEFICIENCY: ICD-10-CM

## 2019-06-04 DIAGNOSIS — Z51.81 ENCOUNTER FOR THERAPEUTIC DRUG MONITORING: Primary | ICD-10-CM

## 2019-06-04 DIAGNOSIS — I10 ESSENTIAL HYPERTENSION: ICD-10-CM

## 2019-06-04 DIAGNOSIS — E55.9 VITAMIN D DEFICIENCY: ICD-10-CM

## 2019-06-04 DIAGNOSIS — E66.09 CLASS 1 OBESITY DUE TO EXCESS CALORIES WITH BODY MASS INDEX (BMI) OF 32.0 TO 32.9 IN ADULT, UNSPECIFIED WHETHER SERIOUS COMORBIDITY PRESENT: ICD-10-CM

## 2019-06-04 PROCEDURE — 99214 OFFICE O/P EST MOD 30 MIN: CPT | Performed by: NURSE PRACTITIONER

## 2019-06-04 PROCEDURE — 82607 VITAMIN B-12: CPT | Performed by: NURSE PRACTITIONER

## 2019-06-04 NOTE — PROGRESS NOTES
HISTORY OF PRESENT ILLNESS  Patient presents with:  Weight Check: down 2 pounds    Cary Oro is a 70year old female here for follow up with medical weight loss program for the treatment of overweight, obesity, or morbid obesity.  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 Eat breakfast daily (within 1 hour)  3. Focus on protein: (15-30 grams with each meal) ie. greek yogurt, cottage cheese, string cheese, hard boiled eggs  4.  Healthy snacks: peanut butter and apples, hummus and carrots, berries, nuts (1/4 cup),

## 2019-06-04 NOTE — PATIENT INSTRUCTIONS
Keep up the great work!! #12 lbs of weight loss so far     PLAN:  Continue with medications: phentermine 15mg, topamax 25mg  Vitamin b12 lab today  Follow up with me in 1 month  Schedule follow up appointments:  Dietitian/nutritionist      Please try to wo helps with mindfulness, meditation, clarity, sleep, and chichi to your daily life.

## 2019-06-05 NOTE — PROGRESS NOTES
Vitamin b12 is normal (just above)- so we don't need to do another round of vitamin b12 injections, but I would recommend doing vitamin b complex over the counter daily

## 2019-07-06 DIAGNOSIS — E66.9 OBESITY (BMI 30.0-34.9): ICD-10-CM

## 2019-07-06 DIAGNOSIS — Z51.81 ENCOUNTER FOR THERAPEUTIC DRUG MONITORING: ICD-10-CM

## 2019-07-08 RX ORDER — TOPIRAMATE 25 MG/1
TABLET ORAL
Qty: 60 TABLET | Refills: 2 | Status: SHIPPED | OUTPATIENT
Start: 2019-07-08 | End: 2019-10-04

## 2019-07-08 NOTE — TELEPHONE ENCOUNTER
Requesting topamax  LOV: 6/4/19  RTC: 1 month  Filled: 4/9/19 #60 with 2 refills    Future Appointments   Date Time Provider Checo Rea   8/2/2019 12:40 PM LUISA Sparrow [facility] [address]       Made appt for patient.  Had to cancel recen

## 2019-08-02 ENCOUNTER — OFFICE VISIT (OUTPATIENT)
Dept: INTERNAL MEDICINE CLINIC | Facility: CLINIC | Age: 69
End: 2019-08-02
Payer: COMMERCIAL

## 2019-08-02 VITALS
BODY MASS INDEX: 30.19 KG/M2 | DIASTOLIC BLOOD PRESSURE: 70 MMHG | HEIGHT: 65 IN | WEIGHT: 181.19 LBS | SYSTOLIC BLOOD PRESSURE: 148 MMHG | HEART RATE: 100 BPM

## 2019-08-02 DIAGNOSIS — E66.09 CLASS 1 OBESITY DUE TO EXCESS CALORIES WITH BODY MASS INDEX (BMI) OF 32.0 TO 32.9 IN ADULT, UNSPECIFIED WHETHER SERIOUS COMORBIDITY PRESENT: ICD-10-CM

## 2019-08-02 DIAGNOSIS — Z51.81 ENCOUNTER FOR THERAPEUTIC DRUG MONITORING: Primary | ICD-10-CM

## 2019-08-02 DIAGNOSIS — E55.9 VITAMIN D DEFICIENCY: ICD-10-CM

## 2019-08-02 DIAGNOSIS — I10 ESSENTIAL HYPERTENSION, BENIGN: ICD-10-CM

## 2019-08-02 PROCEDURE — 99214 OFFICE O/P EST MOD 30 MIN: CPT | Performed by: NURSE PRACTITIONER

## 2019-08-02 RX ORDER — PHENTERMINE HYDROCHLORIDE 37.5 MG/1
37.5 TABLET ORAL
Qty: 30 TABLET | Refills: 0 | Status: SHIPPED | OUTPATIENT
Start: 2019-08-02 | End: 2019-11-15

## 2019-08-02 NOTE — PROGRESS NOTES
HISTORY OF PRESENT ILLNESS  Patient presents with:  Weight Check: down 2#    Marina Shin is a 70year old female here for follow up with medical weight loss program for the treatment of overweight, obesity, or morbid obesity.  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 greek yogurt, cottage cheese, string cheese, hard boiled eggs  4.  Healthy snacks: peanut butter and apples, hummus and carrots, berries, nuts (1/4 cup), tuna and crackers                 Protein Shakes: Premier protein or C

## 2019-08-02 NOTE — PATIENT INSTRUCTIONS
Keep up the great work!!  #17 lbs of weight loss so far!     PLAN:   Continue with medications: phentermine 15mg, topamax 25mg  Follow up with me in 1 month  Schedule follow up appointments:  Dietitian/nutritionist      Please try to work on the following mindfulness, meditation, clarity, sleep, and chichi to your daily life.

## 2019-08-12 ENCOUNTER — TELEPHONE (OUTPATIENT)
Dept: INTERNAL MEDICINE CLINIC | Facility: CLINIC | Age: 69
End: 2019-08-12

## 2019-08-12 NOTE — TELEPHONE ENCOUNTER
Called back to clarify what is needed with pharmacy. Directions say to take one tablet every morning and also 1/2 daily. Which is correct?   I advised it is 1/2 daily #30 which is 60 day supply

## 2019-09-15 ENCOUNTER — HOSPITAL ENCOUNTER (OUTPATIENT)
Age: 69
Discharge: HOME | End: 2019-09-15
Attending: FAMILY MEDICINE
Payer: COMMERCIAL

## 2019-09-15 VITALS
HEART RATE: 90 BPM | TEMPERATURE: 97 F | SYSTOLIC BLOOD PRESSURE: 150 MMHG | RESPIRATION RATE: 18 BRPM | DIASTOLIC BLOOD PRESSURE: 66 MMHG

## 2019-09-15 DIAGNOSIS — J01.90 ACUTE SINUSITIS, RECURRENCE NOT SPECIFIED, UNSPECIFIED LOCATION: Primary | ICD-10-CM

## 2019-09-15 PROCEDURE — 99214 OFFICE O/P EST MOD 30 MIN: CPT

## 2019-09-15 PROCEDURE — 99213 OFFICE O/P EST LOW 20 MIN: CPT

## 2019-09-15 RX ORDER — PREDNISONE 20 MG/1
40 TABLET ORAL DAILY
Qty: 10 TABLET | Refills: 0 | Status: SHIPPED | OUTPATIENT
Start: 2019-09-15 | End: 2019-09-20

## 2019-09-15 RX ORDER — AMOXICILLIN AND CLAVULANATE POTASSIUM 875; 125 MG/1; MG/1
1 TABLET, FILM COATED ORAL 2 TIMES DAILY
Qty: 20 TABLET | Refills: 0 | Status: SHIPPED | OUTPATIENT
Start: 2019-09-15 | End: 2019-09-25

## 2019-09-15 RX ORDER — BENZONATATE 200 MG/1
200 CAPSULE ORAL 3 TIMES DAILY PRN
Qty: 15 CAPSULE | Refills: 0 | Status: SHIPPED | OUTPATIENT
Start: 2019-09-15 | End: 2019-10-04

## 2019-09-15 NOTE — ED PROVIDER NOTES
Patient Seen in: [address] [facility]    History   Patient presents with:  Cough/URI    Stated Complaint: head cold    HPI    *79-year-old female presents to the immediate care today with 1 week history of sinus pressure, sinus congestion, Current:/66   Pulse 90   Temp 97.1 °F (36.2 °C) (Temporal)   Resp 18         Physical Exam    General appearance: alert, appears stated age and cooperative  Head: Normocephalic, without obvious abnormality, atraumatic  Eyes: conjunctivae/cornea Prescribed:  Discharge Medication List as of 9/15/2019 12:57 PM    START taking these medications    Amoxicillin-Pot Clavulanate (AUGMENTIN) 875-125 MG Oral Tab  Take 1 tablet by mouth 2 (two) times daily for 10 days. , Normal, Disp-20 tablet, R-0    predni

## 2019-09-15 NOTE — ED INITIAL ASSESSMENT (HPI)
Pt with c/o uri sx for the past week. Pt with c/o sore throat to start. Pt now with c/o congestion, facial tenderness and cough. Pt with hoarse voice.   Pt just flew back from [place]

## 2019-10-04 ENCOUNTER — OFFICE VISIT (OUTPATIENT)
Dept: INTERNAL MEDICINE CLINIC | Facility: CLINIC | Age: 69
End: 2019-10-04
Payer: COMMERCIAL

## 2019-10-04 VITALS
SYSTOLIC BLOOD PRESSURE: 154 MMHG | HEIGHT: 65 IN | RESPIRATION RATE: 14 BRPM | WEIGHT: 182 LBS | DIASTOLIC BLOOD PRESSURE: 78 MMHG | BODY MASS INDEX: 30.32 KG/M2 | HEART RATE: 100 BPM

## 2019-10-04 DIAGNOSIS — E66.09 CLASS 1 OBESITY DUE TO EXCESS CALORIES WITH BODY MASS INDEX (BMI) OF 32.0 TO 32.9 IN ADULT, UNSPECIFIED WHETHER SERIOUS COMORBIDITY PRESENT: ICD-10-CM

## 2019-10-04 DIAGNOSIS — Z51.81 ENCOUNTER FOR THERAPEUTIC DRUG MONITORING: ICD-10-CM

## 2019-10-04 DIAGNOSIS — E55.9 VITAMIN D DEFICIENCY: ICD-10-CM

## 2019-10-04 DIAGNOSIS — Z51.81 ENCOUNTER FOR THERAPEUTIC DRUG MONITORING: Primary | ICD-10-CM

## 2019-10-04 DIAGNOSIS — I10 ESSENTIAL HYPERTENSION, BENIGN: ICD-10-CM

## 2019-10-04 DIAGNOSIS — E53.8 VITAMIN B12 DEFICIENCY: ICD-10-CM

## 2019-10-04 PROCEDURE — 99214 OFFICE O/P EST MOD 30 MIN: CPT | Performed by: NURSE PRACTITIONER

## 2019-10-04 RX ORDER — TOPIRAMATE 50 MG/1
TABLET, FILM COATED ORAL
Qty: 180 TABLET | Refills: 1 | OUTPATIENT
Start: 2019-10-04

## 2019-10-04 RX ORDER — PHENTERMINE HYDROCHLORIDE 37.5 MG/1
37.5 TABLET ORAL
Qty: 30 TABLET | Refills: 0 | Status: CANCELLED | OUTPATIENT
Start: 2019-10-04

## 2019-10-04 RX ORDER — TOPIRAMATE 50 MG/1
50 TABLET, FILM COATED ORAL 2 TIMES DAILY
Qty: 60 TABLET | Refills: 1 | Status: SHIPPED | OUTPATIENT
Start: 2019-10-04 | End: 2019-12-12

## 2019-10-04 RX ORDER — TOPIRAMATE 25 MG/1
TABLET ORAL
Qty: 60 TABLET | Refills: 2 | Status: CANCELLED | OUTPATIENT
Start: 2019-10-04

## 2019-10-04 NOTE — PATIENT INSTRUCTIONS
PLAN:  Continue with medications: hold phentermine, increase topamax 50mg (1 in the AM and 1 tablet before dinner)   Watch BP  Follow up with me in 1 month  Schedule follow up appointments:  Dietitian/nutritionist      Please try to work on the following d meditation, clarity, sleep, and chichi to your daily life.

## 2019-10-04 NOTE — TELEPHONE ENCOUNTER
Requesting Topiramate   LOV: 10/4/19  RTC: one month  Last Relevant Labs: na  Filled: 10/4/19 #60 with 1 refills    Future Appointments   Date Time Provider Checo Rea   11/15/2019  8:40 AM LUISA De EMG [facility]     Denied 90

## 2019-10-04 NOTE — PROGRESS NOTES
HISTORY OF PRESENT ILLNESS  Patient presents with:  Weight Check: up Ajith Villalobos is a 78year old female here for follow up with medical weight loss program for the treatment of overweight, obesity, or morbid obesity.  Patient has lost -#2 lbs si none, recently cut out   Juice: none  Coffee/Tea: 3 cups of green tea     Recently started to make more foods    Sweet/ Salty tooth: salty   Portion: medium  Eats 3 meals per day: no (will sometimes skip breakfast and lunch)  Number of restaurant or fast f 14 09/25/2018    CREATSERUM 0.85 09/25/2018    CA 9.3 09/25/2018    ALKPHO 84 09/25/2018    AST 33 09/25/2018    ALT 48 09/25/2018    BILT 0.50 09/25/2018    TP 7.2 09/25/2018    ALB 4.0 09/25/2018    AGRATIO 1.7 05/16/2013     09/25/2018    K 4.5 09 Surgery interest: 0/10. Counseled on comprehensive weight loss plan including attention to nutrition, exercise and behavior/stress management for success.  Discussed first goal of weight loss 5% in 3 months and 10% in 6 months    Abnormal labs: vitamin d ordered today:  none    Patient Instructions   PLAN:  Continue with medications: hold phentermine, increase topamax 50mg (1 in the AM and 1 tablet before dinner)   Watch BP  Follow up with me in 1 month  Schedule follow up appointments:  Dietitian/nutritio \"Calm\" or \"Headspace\" which helps with mindfulness, meditation, clarity, sleep, and chichi to your daily life. Return in about 1 month (around 11/4/2019) for weight mangement. Patient verbalizes understanding.     Carrie Walls, LUISA

## 2019-11-15 ENCOUNTER — OFFICE VISIT (OUTPATIENT)
Dept: INTERNAL MEDICINE CLINIC | Facility: CLINIC | Age: 69
End: 2019-11-15
Payer: COMMERCIAL

## 2019-11-15 VITALS
RESPIRATION RATE: 14 BRPM | DIASTOLIC BLOOD PRESSURE: 80 MMHG | BODY MASS INDEX: 30.82 KG/M2 | HEART RATE: 70 BPM | WEIGHT: 185 LBS | HEIGHT: 65 IN | SYSTOLIC BLOOD PRESSURE: 138 MMHG

## 2019-11-15 DIAGNOSIS — E55.9 VITAMIN D DEFICIENCY: ICD-10-CM

## 2019-11-15 DIAGNOSIS — E53.8 VITAMIN B12 DEFICIENCY: ICD-10-CM

## 2019-11-15 DIAGNOSIS — Z51.81 ENCOUNTER FOR THERAPEUTIC DRUG MONITORING: Primary | ICD-10-CM

## 2019-11-15 DIAGNOSIS — I10 ESSENTIAL HYPERTENSION, BENIGN: ICD-10-CM

## 2019-11-15 DIAGNOSIS — E66.9 OBESITY (BMI 30.0-34.9): ICD-10-CM

## 2019-11-15 PROCEDURE — 99213 OFFICE O/P EST LOW 20 MIN: CPT | Performed by: NURSE PRACTITIONER

## 2019-11-15 NOTE — PATIENT INSTRUCTIONS
PLAN:  Continue with medications: phentermine 8mg (take 1 tablet in the morning and 1 tablet in the afternoon), topamax 50mg (1 tablet in the AM and 1 tablet in the PM)   Follow up with me in 1 month  Schedule follow up appointments:  Dietitian/nutritionis or \"Headspace\" which helps with mindfulness, meditation, clarity, sleep, and chichi to your daily life.

## 2019-11-15 NOTE — PROGRESS NOTES
HISTORY OF PRESENT ILLNESS  Patient presents with:  Weight Check: up [address] is a 78year old female here for follow up with medical weight loss program for the treatment of overweight, obesity, or morbid obesity.  Patient has gained -#3 lbs s out   Juice: none  Coffee/Tea: 3 cups of green tea     Recently started to make more foods    Sweet/ Salty tooth: salty   Portion: medium  Eats 3 meals per day: no (will sometimes skip breakfast and lunch)  Number of restaurant or fast food meals/week: 0-1 CREATSERUM 0.85 09/25/2018    CA 9.3 09/25/2018    ALKPHO 84 09/25/2018    AST 33 09/25/2018    ALT 48 09/25/2018    BILT 0.50 09/25/2018    TP 7.2 09/25/2018    ALB 4.0 09/25/2018    AGRATIO 1.7 05/16/2013     09/25/2018    K 4.5 09/25/2018    CL 10 vitamin d deficiency,   EKG not done in office, was done at pcp office 3/2018  Muscle mass 14.4% -low and strength training recommended     PLAN   Recommendations from nutritionist, start using MFP or paper charting  Start to increase exercising/activity ( consumption if soda/juice drinker  2. Eat breakfast daily (within 1 hour)  3. Focus on protein: (15-30 grams with each meal) ie. greek yogurt, cottage cheese, string cheese, hard boiled eggs  4.  Healthy snacks: peanut butter and apples, hummus and carrots,

## 2019-11-19 ENCOUNTER — TELEPHONE (OUTPATIENT)
Dept: INTERNAL MEDICINE CLINIC | Facility: CLINIC | Age: 69
End: 2019-11-19

## 2019-11-19 NOTE — TELEPHONE ENCOUNTER
Prior authorization request received from [place] for [facility]  Entered in [facility]  Awaiting decision.

## 2019-11-26 ENCOUNTER — OFFICE VISIT (OUTPATIENT)
Dept: OBGYN CLINIC | Facility: CLINIC | Age: 69
End: 2019-11-26
Payer: COMMERCIAL

## 2019-11-26 VITALS
BODY MASS INDEX: 31.2 KG/M2 | HEIGHT: 64.5 IN | WEIGHT: 185 LBS | SYSTOLIC BLOOD PRESSURE: 126 MMHG | DIASTOLIC BLOOD PRESSURE: 70 MMHG

## 2019-11-26 DIAGNOSIS — Z12.39 BREAST CANCER SCREENING: ICD-10-CM

## 2019-11-26 DIAGNOSIS — Z01.419 WELL FEMALE EXAM WITH ROUTINE GYNECOLOGICAL EXAM: Primary | ICD-10-CM

## 2019-11-26 DIAGNOSIS — R23.2 HOT FLASHES: ICD-10-CM

## 2019-11-26 PROCEDURE — 99387 INIT PM E/M NEW PAT 65+ YRS: CPT | Performed by: OBSTETRICS & GYNECOLOGY

## 2019-11-26 NOTE — PROGRESS NOTES
GYN H&P     2019  12:00 PM    CC: Patient is here for annual    HPI: patient is a 78year old  here for her annual gyn exam.   She has no complaints except occas hot flashes. She was previously on hrt with Dr. Cynthia Bryant.  Stopped about 1 ye MG Oral Tab, TAKE 1 TABLET(40 MG) BY MOUTH DAILY, Disp: 90 tablet, Rfl: 1    No current facility-administered medications on file prior to visit.      Family History   Problem Relation Age of Onset   • Arthritis Mother    • High Blood Pressure Mother    • H Not on file        Forced sexual activity: Not on file    Other Topics      Concerns:        Caffeine Concern: Yes          3-4 cup green tea         Exercise: Yes        Seat Belt: Yes        Special Diet: Not Asked        Stress Concern: Not Asked lesions  HEENT: normal  NECK: supple; no thyroidmegaly, no adenopathy  LUNGS: clear to auscultation  CARDIOVASCULAR: normal S1, S2, RRR  BREASTS:  nontendder, no palpable masses or nodes, no nipple discharge, no skin changes, no axillary adenopathy  ABDOME

## 2019-12-02 NOTE — TELEPHONE ENCOUNTER
Jessica Kennedy approved for coverage from 11/18/19 to 2/18/20  Case #  [id number]      [facility]

## 2019-12-12 DIAGNOSIS — E66.09 CLASS 1 OBESITY DUE TO EXCESS CALORIES WITH BODY MASS INDEX (BMI) OF 32.0 TO 32.9 IN ADULT, UNSPECIFIED WHETHER SERIOUS COMORBIDITY PRESENT: ICD-10-CM

## 2019-12-12 DIAGNOSIS — Z51.81 ENCOUNTER FOR THERAPEUTIC DRUG MONITORING: ICD-10-CM

## 2019-12-12 RX ORDER — TOPIRAMATE 50 MG/1
TABLET, FILM COATED ORAL
Qty: 60 TABLET | Refills: 0 | Status: SHIPPED | OUTPATIENT
Start: 2019-12-12 | End: 2020-06-22

## 2019-12-12 NOTE — TELEPHONE ENCOUNTER
Requesting topiramate  LOV: 11/15/19  RTC: one month  Last Relevant Labs: na  Filled: 10/4/19 #60 with 1 refills    1/10/2020  9:00 AM Yue Farah APRN EMGBRANDY EM[facility]     You have no openings to move appt up quicker do you want to refill?

## 2019-12-13 PROCEDURE — 88304 TISSUE EXAM BY PATHOLOGIST: CPT | Performed by: ORTHOPAEDIC SURGERY

## 2019-12-17 ENCOUNTER — HOSPITAL ENCOUNTER (OUTPATIENT)
Age: 69
Discharge: HOME | End: 2019-12-17
Attending: FAMILY MEDICINE
Payer: COMMERCIAL

## 2019-12-17 VITALS
SYSTOLIC BLOOD PRESSURE: 141 MMHG | OXYGEN SATURATION: 99 % | RESPIRATION RATE: 20 BRPM | DIASTOLIC BLOOD PRESSURE: 69 MMHG | TEMPERATURE: 98 F | HEART RATE: 87 BPM

## 2019-12-17 DIAGNOSIS — R09.81 SINUS CONGESTION: ICD-10-CM

## 2019-12-17 DIAGNOSIS — H65.03 BILATERAL ACUTE SEROUS OTITIS MEDIA, RECURRENCE NOT SPECIFIED: Primary | ICD-10-CM

## 2019-12-17 PROCEDURE — 99213 OFFICE O/P EST LOW 20 MIN: CPT

## 2019-12-17 PROCEDURE — 99214 OFFICE O/P EST MOD 30 MIN: CPT

## 2019-12-17 RX ORDER — PREDNISONE 20 MG/1
40 TABLET ORAL DAILY
Qty: 10 TABLET | Refills: 0 | Status: SHIPPED | OUTPATIENT
Start: 2019-12-17 | End: 2019-12-22

## 2019-12-17 RX ORDER — PHENTERMINE HYDROCHLORIDE 15 MG/1
15 CAPSULE ORAL EVERY MORNING
COMMUNITY
End: 2020-01-10

## 2019-12-17 RX ORDER — AMOXICILLIN 875 MG/1
875 TABLET, COATED ORAL 2 TIMES DAILY
Qty: 20 TABLET | Refills: 0 | Status: SHIPPED | OUTPATIENT
Start: 2019-12-17 | End: 2019-12-27

## 2019-12-17 NOTE — ED PROVIDER NOTES
Patient presents with:  Ear Problem  Nasal Congestion    HPI:     Dian Blum is a 78year old female who presents with chief complaint of bilateral ear pain. Onset of symptoms was abrupt starting 9 days ago.     Recent URI: yes   Fever: no   Sore t organomegaly  Extremities: extremities normal, atraumatic, no cyanosis or edema  Skin: Skin color, texture, turgor normal. No rashes or lesions      Assessment/Plan:   Medications for this encounter:  [unfilled]    Orders Placed This Encounter      predniSON

## 2020-01-10 ENCOUNTER — OFFICE VISIT (OUTPATIENT)
Dept: INTERNAL MEDICINE CLINIC | Facility: CLINIC | Age: 70
End: 2020-01-10
Payer: COMMERCIAL

## 2020-01-10 VITALS
RESPIRATION RATE: 14 BRPM | WEIGHT: 185 LBS | HEIGHT: 65 IN | SYSTOLIC BLOOD PRESSURE: 100 MMHG | DIASTOLIC BLOOD PRESSURE: 60 MMHG | HEART RATE: 72 BPM | BODY MASS INDEX: 30.82 KG/M2

## 2020-01-10 DIAGNOSIS — I10 ESSENTIAL HYPERTENSION: ICD-10-CM

## 2020-01-10 DIAGNOSIS — Z51.81 ENCOUNTER FOR THERAPEUTIC DRUG MONITORING: Primary | ICD-10-CM

## 2020-01-10 DIAGNOSIS — E66.09 CLASS 1 OBESITY DUE TO EXCESS CALORIES WITH BODY MASS INDEX (BMI) OF 32.0 TO 32.9 IN ADULT, UNSPECIFIED WHETHER SERIOUS COMORBIDITY PRESENT: ICD-10-CM

## 2020-01-10 DIAGNOSIS — E53.8 VITAMIN B12 DEFICIENCY: ICD-10-CM

## 2020-01-10 DIAGNOSIS — E55.9 VITAMIN D DEFICIENCY: ICD-10-CM

## 2020-01-10 PROCEDURE — 99213 OFFICE O/P EST LOW 20 MIN: CPT | Performed by: NURSE PRACTITIONER

## 2020-01-10 RX ORDER — TOPIRAMATE 50 MG/1
25 TABLET, FILM COATED ORAL 2 TIMES DAILY
Qty: 180 TABLET | Refills: 0 | Status: SHIPPED | OUTPATIENT
Start: 2020-01-10 | End: 2020-09-21

## 2020-01-10 NOTE — PATIENT INSTRUCTIONS
Keep up the great work! !     PLAN:  Continue with medications: lomaria and topamax 50mg  Follow up with me in 1 month  Schedule follow up appointments:  Dietitian/nutritionist      Please try to work on the following dietary changes:  1.  Drink lots of wate to your daily life.

## 2020-01-10 NOTE — PROGRESS NOTES
HISTORY OF PRESENT ILLNESS  Patient presents with:  Weight Check: stayed the same     Rasheldmitriy Mott is a 78year old female here for follow up with medical weight loss program for the treatment of overweight, obesity, or morbid obesity.  Patient has lost Soda: none, recently cut out   Juice: none  Coffee/Tea: 3 cups of green tea     Recently started to make more foods    Sweet/ Salty tooth: salty   Portion: medium  Eats 3 meals per day: no (will sometimes skip breakfast and lunch)  Number of restaurant o BUN 18.0 12/11/2019    CREATSERUM 0.92 12/11/2019    CA 9.1 12/11/2019    ALKPHO 86 12/11/2019    AST 35 12/11/2019    ALT 38 12/11/2019    BILT 0.60 12/11/2019    TP 7.7 12/11/2019    ALB 4.1 12/11/2019    AGRATIO 1.7 05/16/2013     12/11/2019 10/10, Interest in Medication: 10/10, Surgery interest: 0/10. Counseled on comprehensive weight loss plan including attention to nutrition, exercise and behavior/stress management for success.  Discussed first goal of weight loss 5% in 3 months and 10% i

## 2020-02-05 PROCEDURE — 88305 TISSUE EXAM BY PATHOLOGIST: CPT | Performed by: INTERNAL MEDICINE

## 2020-03-29 ENCOUNTER — HOSPITAL ENCOUNTER (OUTPATIENT)
Age: 70
Discharge: HOME | End: 2020-03-29
Attending: FAMILY MEDICINE
Payer: COMMERCIAL

## 2020-03-29 VITALS
TEMPERATURE: 98 F | HEART RATE: 83 BPM | SYSTOLIC BLOOD PRESSURE: 151 MMHG | RESPIRATION RATE: 22 BRPM | DIASTOLIC BLOOD PRESSURE: 75 MMHG | OXYGEN SATURATION: 97 %

## 2020-03-29 DIAGNOSIS — L30.9 PERIORBITAL DERMATITIS: Primary | ICD-10-CM

## 2020-03-29 PROCEDURE — 99213 OFFICE O/P EST LOW 20 MIN: CPT

## 2020-03-29 RX ORDER — DESONIDE 0.5 MG/G
OINTMENT TOPICAL
Qty: 15 G | Refills: 0 | Status: SHIPPED | OUTPATIENT
Start: 2020-03-29 | End: 2020-09-21

## 2020-03-29 NOTE — ED PROVIDER NOTES
[place] [id number] [facility] [place]      [place]   Patient presents with:  Rash    Stated Complaint: rash on face x since monday     HPI    26-year-old female presents today with complaints of itchy rash to the right side of the face in the Current:/75   Pulse 83   Temp 97.7 °F (36.5 °C) (Oral)   Resp 22   SpO2 97%         Physical Exam  HENT:      Head:           Patient is alert oriented in no acute distress    Conjunctiva clear pupils equally react to light bilaterally, no corneal in

## 2020-03-29 NOTE — ED INITIAL ASSESSMENT (HPI)
Patient noted a small lesion under her right eye approximately 1 week ago. Noted a second lesion next to it yesterday.

## 2020-06-22 ENCOUNTER — VIRTUAL PHONE E/M (OUTPATIENT)
Dept: INTERNAL MEDICINE CLINIC | Facility: CLINIC | Age: 70
End: 2020-06-22

## 2020-06-22 VITALS — WEIGHT: 195 LBS | BODY MASS INDEX: 32 KG/M2

## 2020-06-22 DIAGNOSIS — Z51.81 ENCOUNTER FOR THERAPEUTIC DRUG MONITORING: Primary | ICD-10-CM

## 2020-06-22 DIAGNOSIS — E66.09 CLASS 1 OBESITY DUE TO EXCESS CALORIES WITH BODY MASS INDEX (BMI) OF 32.0 TO 32.9 IN ADULT, UNSPECIFIED WHETHER SERIOUS COMORBIDITY PRESENT: ICD-10-CM

## 2020-06-22 DIAGNOSIS — I10 ESSENTIAL HYPERTENSION, BENIGN: ICD-10-CM

## 2020-06-22 DIAGNOSIS — R63.5 GAIN OF WEIGHT: ICD-10-CM

## 2020-06-22 DIAGNOSIS — E66.9 OBESITY (BMI 30.0-34.9): ICD-10-CM

## 2020-06-22 DIAGNOSIS — E55.9 VITAMIN D DEFICIENCY: ICD-10-CM

## 2020-06-22 DIAGNOSIS — E53.8 VITAMIN B12 DEFICIENCY: ICD-10-CM

## 2020-06-22 PROCEDURE — 99213 OFFICE O/P EST LOW 20 MIN: CPT | Performed by: NURSE PRACTITIONER

## 2020-06-22 RX ORDER — TOPIRAMATE 50 MG/1
50 TABLET, FILM COATED ORAL 2 TIMES DAILY
Qty: 60 TABLET | Refills: 0 | Status: SHIPPED | OUTPATIENT
Start: 2020-06-22 | End: 2020-08-14

## 2020-06-22 NOTE — PROGRESS NOTES
Virtual Telephone Check-In    Moustapha Brock verbally consents to a Virtual/Telephone Check-In visit on 6/22/2020. Patient understands and accepts financial responsibility for any deductible, co-insurance and/or co-pays associated with this service. mood changes     Physical Exam:  Appropriate affect and mood, normal logic and thought process   Patient speaking in full sentences, no increased work of breathing    Assessment/Plan:   Diagnoses and all orders for this visit:    Encounter for therapeutic detailed in the plan of care above.      LUISA Mosqueda

## 2020-06-22 NOTE — PATIENT INSTRUCTIONS
We are here to support you with weight loss, but please remember that you still need your primary care provider for your routine health maintenance.       PLAN:  Will restart phentermine 8mg daily and topamax 50mg (1 in AM and 1 in PM)  Follow up with me in Activity level: not very active (can't count exercise towards calorie number per day)                   ** Daily INPUT> Look at nutrition section-- \"nutrients\" and it will break down your macros for the day (ie.  Protein, carbs, fibers, suga

## 2020-06-23 ENCOUNTER — TELEPHONE (OUTPATIENT)
Dept: INTERNAL MEDICINE CLINIC | Facility: CLINIC | Age: 70
End: 2020-06-23

## 2020-06-23 NOTE — TELEPHONE ENCOUNTER
REcieved request for PA for Jennifer Dickey from [place]  Entered in [facility] decision

## 2020-06-24 ENCOUNTER — TELEPHONE (OUTPATIENT)
Dept: INTERNAL MEDICINE CLINIC | Facility: CLINIC | Age: 70
End: 2020-06-24

## 2020-06-24 RX ORDER — TOPIRAMATE 50 MG/1
25 TABLET, FILM COATED ORAL 2 TIMES DAILY
Qty: 180 TABLET | Refills: 0 | OUTPATIENT
Start: 2020-06-24

## 2020-06-24 NOTE — TELEPHONE ENCOUNTER
Pharmacy requesting change to 90 day RX for topiramate (not required by insurance)  Denied and asked to leave as written

## 2020-07-14 ENCOUNTER — VIRTUAL PHONE E/M (OUTPATIENT)
Dept: INTERNAL MEDICINE CLINIC | Facility: CLINIC | Age: 70
End: 2020-07-14

## 2020-07-14 DIAGNOSIS — E55.9 VITAMIN D DEFICIENCY: ICD-10-CM

## 2020-07-14 DIAGNOSIS — Z51.81 ENCOUNTER FOR THERAPEUTIC DRUG MONITORING: Primary | ICD-10-CM

## 2020-07-14 DIAGNOSIS — I10 ESSENTIAL HYPERTENSION, BENIGN: ICD-10-CM

## 2020-07-14 DIAGNOSIS — E53.8 VITAMIN B12 DEFICIENCY: ICD-10-CM

## 2020-07-14 DIAGNOSIS — E66.9 OBESITY (BMI 30.0-34.9): ICD-10-CM

## 2020-07-14 PROCEDURE — 99213 OFFICE O/P EST LOW 20 MIN: CPT | Performed by: NURSE PRACTITIONER

## 2020-07-14 NOTE — PATIENT INSTRUCTIONS
We are here to support you with weight loss, but please remember that you still need your primary care provider for your routine health maintenance.       PLAN:  Will continue with lomaria 8mg bid and topamax  Follow up with me in 2 month  Schedule follow u ** Daily INPUT> Look at nutrition section-- \"nutrients\" and it will break down your macros for the day (ie. Protein, carbs, fibers, sugars and fats). Try to stay within these numbers daily     2.  \"7 minute workout\" to help with exercise/a

## 2020-07-14 NOTE — PROGRESS NOTES
Virtual Telephone Check-In    Silvia Rangel verbally consents to a Virtual/Telephone Check-In visit on 7/14/2020. Patient understands and accepts financial responsibility for any deductible, co-insurance and/or co-pays associated with this service. Exam:  Appropriate affect and mood, normal logic and thought process   Patient speaking in full sentences, no increased work of breathing    Assessment/Plan:   Diagnoses and all orders for this visit:    Encounter for therapeutic drug monitoring  Obesity ( of care above.      Leonidas Castellanos, APRN

## 2020-08-03 DIAGNOSIS — E66.09 CLASS 1 OBESITY DUE TO EXCESS CALORIES WITH BODY MASS INDEX (BMI) OF 32.0 TO 32.9 IN ADULT, UNSPECIFIED WHETHER SERIOUS COMORBIDITY PRESENT: ICD-10-CM

## 2020-08-03 DIAGNOSIS — Z51.81 ENCOUNTER FOR THERAPEUTIC DRUG MONITORING: ICD-10-CM

## 2020-08-03 NOTE — TELEPHONE ENCOUNTER
Requesting Topiramate 50 mg  LOV: 6/22/20  RTC: one month  Last Relevant Labs: na  Filled: 6/22/20 #60 with 0 refills    No future appointments. My chart sent.

## 2020-08-14 NOTE — TELEPHONE ENCOUNTER
I made an error and patient was seen 7/14/20 and to return in 2 months. Will you refll her med please.

## 2020-08-17 DIAGNOSIS — Z51.81 ENCOUNTER FOR THERAPEUTIC DRUG MONITORING: ICD-10-CM

## 2020-08-17 DIAGNOSIS — E66.09 CLASS 1 OBESITY DUE TO EXCESS CALORIES WITH BODY MASS INDEX (BMI) OF 32.0 TO 32.9 IN ADULT, UNSPECIFIED WHETHER SERIOUS COMORBIDITY PRESENT: ICD-10-CM

## 2020-08-17 RX ORDER — TOPIRAMATE 50 MG/1
50 TABLET, FILM COATED ORAL 2 TIMES DAILY
Qty: 60 TABLET | Refills: 1 | Status: SHIPPED | OUTPATIENT
Start: 2020-08-17 | End: 2020-09-21

## 2020-08-17 RX ORDER — TOPIRAMATE 50 MG/1
TABLET, FILM COATED ORAL
Qty: 180 TABLET | Refills: 0 | OUTPATIENT
Start: 2020-08-17

## 2020-08-17 NOTE — TELEPHONE ENCOUNTER
Received request to change topamax to 90 day which is NOT required by insurance. Denied with note to leave as written today.

## 2020-09-21 ENCOUNTER — OFFICE VISIT (OUTPATIENT)
Dept: INTERNAL MEDICINE CLINIC | Facility: CLINIC | Age: 70
End: 2020-09-21
Payer: COMMERCIAL

## 2020-09-21 VITALS
BODY MASS INDEX: 32.99 KG/M2 | HEIGHT: 65 IN | TEMPERATURE: 97 F | RESPIRATION RATE: 16 BRPM | HEART RATE: 100 BPM | SYSTOLIC BLOOD PRESSURE: 120 MMHG | WEIGHT: 198 LBS | DIASTOLIC BLOOD PRESSURE: 78 MMHG

## 2020-09-21 DIAGNOSIS — E66.9 OBESITY (BMI 30.0-34.9): ICD-10-CM

## 2020-09-21 DIAGNOSIS — I10 ESSENTIAL HYPERTENSION, BENIGN: ICD-10-CM

## 2020-09-21 DIAGNOSIS — Z51.81 ENCOUNTER FOR THERAPEUTIC DRUG MONITORING: Primary | ICD-10-CM

## 2020-09-21 PROBLEM — E66.811 OBESITY (BMI 30.0-34.9): Status: ACTIVE | Noted: 2020-09-21

## 2020-09-21 PROCEDURE — 99213 OFFICE O/P EST LOW 20 MIN: CPT | Performed by: NURSE PRACTITIONER

## 2020-09-21 PROCEDURE — 3078F DIAST BP <80 MM HG: CPT | Performed by: NURSE PRACTITIONER

## 2020-09-21 PROCEDURE — 3008F BODY MASS INDEX DOCD: CPT | Performed by: NURSE PRACTITIONER

## 2020-09-21 PROCEDURE — 3074F SYST BP LT 130 MM HG: CPT | Performed by: NURSE PRACTITIONER

## 2020-09-21 RX ORDER — NALTREXONE HYDROCHLORIDE AND BUPROPION HYDROCHLORIDE 8; 90 MG/1; MG/1
TABLET, EXTENDED RELEASE ORAL
Qty: 120 TABLET | Refills: 1 | Status: SHIPPED | OUTPATIENT
Start: 2020-09-21 | End: 2021-03-05 | Stop reason: ALTCHOICE

## 2020-09-21 NOTE — PATIENT INSTRUCTIONS
Continue making lifestyle changes that focus on good nutrition, regular exercise and stress management. Medication Plan: Stop phentermine.  Start Contrave as directed and tolerated according to titration schedule:  Week 1- 1 tab daily in AM with food  We researchers argue that the absence of estrogens may be an important obesity-triggering factor. Estrogen deficiency enhances metabolic dysfunction predisposing to type 2 diabetes mellitus, the metabolic syndrome, and cardiovascular diseases.  Estrogen plays know the risks, here are some ways to stay healthy during this midlife transition and avoid a midlife crisis:    Reduce calories. During menopause, our energy expenditure decreases even if our activity level and nutrient intake stays the same.  This is seco gone through a similar situation. Taken from [address].  MD Marin, Breanna Prime

## 2020-09-21 NOTE — PROGRESS NOTES
Lon Huston is a 69year old female presents today for follow-up on medical weight loss program for the treatment of overweight, obesity, or morbid obesity with associated HTN.     S:  Current weight Wt Readings from Last 6 Encounters:  09/21/20 : 198 S1 and S2 without clicks or gallops, no pedal edema.   GI: +BS, soft  NEURO/MS: motor and sensory grossly intact  PSYCH: pleasant, cooperative, normal mood and affect    ASSESSMENT AND PLAN:  Encounter for therapeutic drug monitoring  (primary encounter kenyatta includin. Remember the CALM saskia for help with stress management and behavior change. 2. Make it a routine of reading the daily blog from www.[employer]. org for motivation, exercise and nutrition tips relevant to today's culture.  Set a notifi different from when you were younger. An increase in visceral (abdominal) fat is linked to an increase in insulin resistance, diabetes, and inflammatory diseases.     Another factor contributing to weight gain in perimenopause may be the increased appetite friend, ordering the lighter portion when available, or put half in the takeout box right away. Swap out dessert for fruit or yogurt. Reduce carbs.  Cut back on carbs in order to reduce the increase in belly fat, which drives metabolic problems   Add fibe

## 2020-09-22 ENCOUNTER — TELEPHONE (OUTPATIENT)
Dept: INTERNAL MEDICINE CLINIC | Facility: CLINIC | Age: 70
End: 2020-09-22

## 2020-11-24 DIAGNOSIS — Z11.59 SPECIAL SCREENING EXAMINATION FOR UNSPECIFIED VIRAL DISEASE: Primary | ICD-10-CM

## 2020-11-27 ENCOUNTER — HOSPITAL ENCOUNTER (OUTPATIENT)
Dept: LAB | Age: 70
Discharge: HOME | End: 2020-11-27
Attending: PLASTIC SURGERY

## 2020-11-27 DIAGNOSIS — Z01.818 OTHER SPECIFIED PRE-OPERATIVE EXAMINATION: Primary | ICD-10-CM

## 2020-11-27 DIAGNOSIS — Z01.818 OTHER SPECIFIED PRE-OPERATIVE EXAMINATION: ICD-10-CM

## 2020-11-27 LAB
ATRIAL RATE (BPM): 114
P AXIS (DEGREES): 58
PR-INTERVAL (MSEC): 126
QRS-INTERVAL (MSEC): 72
QT-INTERVAL (MSEC): 316
QTC: 435
R AXIS (DEGREES): 11
REPORT TEXT: NORMAL
T AXIS (DEGREES): 19
VENTRICULAR RATE EKG/MIN (BPM): 114

## 2020-11-27 PROCEDURE — 93005 ELECTROCARDIOGRAM TRACING: CPT | Performed by: PLASTIC SURGERY

## 2020-12-02 ENCOUNTER — LAB SERVICES (OUTPATIENT)
Dept: LAB | Age: 70
End: 2020-12-02

## 2020-12-02 DIAGNOSIS — Z11.59 SPECIAL SCREENING EXAMINATION FOR UNSPECIFIED VIRAL DISEASE: ICD-10-CM

## 2020-12-02 PROCEDURE — U0003 INFECTIOUS AGENT DETECTION BY NUCLEIC ACID (DNA OR RNA); SEVERE ACUTE RESPIRATORY SYNDROME CORONAVIRUS 2 (SARS-COV-2) (CORONAVIRUS DISEASE [COVID-19]), AMPLIFIED PROBE TECHNIQUE, MAKING USE OF HIGH THROUGHPUT TECHNOLOGIES AS DESCRIBED BY CMS-2020-01-R: HCPCS | Performed by: INTERNAL MEDICINE

## 2020-12-03 LAB
SARS-COV-2 RNA RESP QL NAA+PROBE: NOT DETECTED
SERVICE CMNT-IMP: NORMAL
SPECIMEN SOURCE: NORMAL

## 2021-03-05 ENCOUNTER — TELEPHONE (OUTPATIENT)
Dept: FAMILY MEDICINE CLINIC | Facility: CLINIC | Age: 71
End: 2021-03-05

## 2021-03-05 ENCOUNTER — OFFICE VISIT (OUTPATIENT)
Dept: FAMILY MEDICINE CLINIC | Facility: CLINIC | Age: 71
End: 2021-03-05
Payer: COMMERCIAL

## 2021-03-05 DIAGNOSIS — M15.9 PRIMARY OSTEOARTHRITIS INVOLVING MULTIPLE JOINTS: ICD-10-CM

## 2021-03-05 DIAGNOSIS — I10 ESSENTIAL HYPERTENSION, BENIGN: Primary | ICD-10-CM

## 2021-03-05 DIAGNOSIS — Z78.0 ASYMPTOMATIC MENOPAUSAL STATE: ICD-10-CM

## 2021-03-05 PROBLEM — M15.0 PRIMARY OSTEOARTHRITIS INVOLVING MULTIPLE JOINTS: Status: ACTIVE | Noted: 2021-03-05

## 2021-03-05 LAB
ANION GAP SERPL CALC-SCNC: 6 MMOL/L (ref 0–18)
BUN BLD-MCNC: 14 MG/DL (ref 7–18)
BUN/CREAT SERPL: 14.6 (ref 10–20)
CALCIUM BLD-MCNC: 9.6 MG/DL (ref 8.5–10.1)
CHLORIDE SERPL-SCNC: 104 MMOL/L (ref 98–112)
CO2 SERPL-SCNC: 27 MMOL/L (ref 21–32)
CREAT BLD-MCNC: 0.96 MG/DL
GLUCOSE BLD-MCNC: 93 MG/DL (ref 70–99)
OSMOLALITY SERPL CALC.SUM OF ELEC: 284 MOSM/KG (ref 275–295)
PATIENT FASTING Y/N/NP: NO
POTASSIUM SERPL-SCNC: 4.7 MMOL/L (ref 3.5–5.1)
SODIUM SERPL-SCNC: 137 MMOL/L (ref 136–145)

## 2021-03-05 PROCEDURE — 3075F SYST BP GE 130 - 139MM HG: CPT | Performed by: FAMILY MEDICINE

## 2021-03-05 PROCEDURE — 3008F BODY MASS INDEX DOCD: CPT | Performed by: FAMILY MEDICINE

## 2021-03-05 PROCEDURE — 36415 COLL VENOUS BLD VENIPUNCTURE: CPT | Performed by: FAMILY MEDICINE

## 2021-03-05 PROCEDURE — 3078F DIAST BP <80 MM HG: CPT | Performed by: FAMILY MEDICINE

## 2021-03-05 PROCEDURE — 99203 OFFICE O/P NEW LOW 30 MIN: CPT | Performed by: FAMILY MEDICINE

## 2021-03-05 PROCEDURE — 80048 BASIC METABOLIC PNL TOTAL CA: CPT | Performed by: FAMILY MEDICINE

## 2021-03-05 RX ORDER — AMLODIPINE BESYLATE 5 MG/1
7.5 TABLET ORAL DAILY
Qty: 135 TABLET | Refills: 0 | Status: SHIPPED | OUTPATIENT
Start: 2021-03-05 | End: 2021-05-27

## 2021-03-05 RX ORDER — OLMESARTAN MEDOXOMIL 40 MG/1
TABLET ORAL
Qty: 90 TABLET | Refills: 0 | Status: SHIPPED | OUTPATIENT
Start: 2021-03-05 | End: 2021-05-27

## 2021-03-05 RX ORDER — CHOLECALCIFEROL (VITAMIN D3) 125 MCG
1 CAPSULE ORAL DAILY
Qty: 90 TABLET | Refills: 3 | Status: SHIPPED | OUTPATIENT
Start: 2021-03-05 | End: 2022-03-05

## 2021-03-05 RX ORDER — SENNOSIDES 8.6 MG
1300 CAPSULE ORAL EVERY 8 HOURS PRN
Qty: 1 PACKAGE | Refills: 0 | Status: SHIPPED | OUTPATIENT
Start: 2021-03-05

## 2021-03-05 NOTE — TELEPHONE ENCOUNTER
Pt called office stating she was asked to call office back with her BP reading. Pt states her BP at the moment is 130/76.

## 2021-03-05 NOTE — PROGRESS NOTES
CHIEF COMPLAINT: Patient presents with:  Establish Care: New patient establishing care     HPI:     Laly Abarca is a 69year old female presents for establish care. Has history of hypertension x30 years. Taking olmesartan and amlodipine.   Denies latricia AND RIGHT wrist re construction    • TOTAL ABDOM HYSTERECTOMY      Partial.      Family History   Problem Relation Age of Onset   • Arthritis Mother    • High Blood Pressure Mother    • Heart Disease Mother    • Heart Disorder Mother 48        angina   • P adenopathy,no bruits, no JVD  LUNGS: clear to auscultation bilateral. No RRW. Good inspiratory and expiratory effort  CARDIO: RRR without murmur, clear S1, S2  VS: no carotid artery or abdominal aorta bruit,    GI: good BS's,no masses,No HSM or tenderness. Maintenance:  Zoster Vaccines(1 of 2) due on 08/24/1999  DEXA Scan due on 08/24/2014  Mammogram due on 02/01/2018  Annual Depression Screen due on 12/11/2020  Annual Physical due on 12/11/2020  Fall Risk Screening due on 03/29/2021  Colonoscopy due on 02/0

## 2021-03-06 VITALS
OXYGEN SATURATION: 97 % | RESPIRATION RATE: 18 BRPM | BODY MASS INDEX: 34.26 KG/M2 | HEART RATE: 89 BPM | SYSTOLIC BLOOD PRESSURE: 130 MMHG | TEMPERATURE: 98 F | HEIGHT: 65 IN | WEIGHT: 205.63 LBS | DIASTOLIC BLOOD PRESSURE: 76 MMHG

## 2021-03-22 ENCOUNTER — HOSPITAL ENCOUNTER (OUTPATIENT)
Dept: BONE DENSITY | Age: 71
Discharge: HOME | End: 2021-03-22
Attending: FAMILY MEDICINE
Payer: COMMERCIAL

## 2021-03-22 DIAGNOSIS — Z78.0 ASYMPTOMATIC MENOPAUSAL STATE: ICD-10-CM

## 2021-03-22 PROCEDURE — 77080 DXA BONE DENSITY AXIAL: CPT | Performed by: FAMILY MEDICINE

## 2021-04-27 ENCOUNTER — OFFICE VISIT (OUTPATIENT)
Dept: FAMILY MEDICINE CLINIC | Facility: CLINIC | Age: 71
End: 2021-04-27
Payer: COMMERCIAL

## 2021-04-27 VITALS
RESPIRATION RATE: 18 BRPM | WEIGHT: 207.63 LBS | OXYGEN SATURATION: 96 % | HEIGHT: 65 IN | DIASTOLIC BLOOD PRESSURE: 62 MMHG | TEMPERATURE: 98 F | BODY MASS INDEX: 34.59 KG/M2 | HEART RATE: 80 BPM | SYSTOLIC BLOOD PRESSURE: 148 MMHG

## 2021-04-27 DIAGNOSIS — M54.50 CHRONIC MIDLINE LOW BACK PAIN WITHOUT SCIATICA: ICD-10-CM

## 2021-04-27 DIAGNOSIS — G89.29 CHRONIC MIDLINE LOW BACK PAIN WITHOUT SCIATICA: ICD-10-CM

## 2021-04-27 DIAGNOSIS — G89.29 CHRONIC MIDLINE LOW BACK PAIN WITHOUT SCIATICA: Primary | ICD-10-CM

## 2021-04-27 DIAGNOSIS — M54.50 CHRONIC MIDLINE LOW BACK PAIN WITHOUT SCIATICA: Primary | ICD-10-CM

## 2021-04-27 PROCEDURE — 3077F SYST BP >= 140 MM HG: CPT | Performed by: FAMILY MEDICINE

## 2021-04-27 PROCEDURE — 99213 OFFICE O/P EST LOW 20 MIN: CPT | Performed by: FAMILY MEDICINE

## 2021-04-27 PROCEDURE — 3078F DIAST BP <80 MM HG: CPT | Performed by: FAMILY MEDICINE

## 2021-04-27 PROCEDURE — 3008F BODY MASS INDEX DOCD: CPT | Performed by: FAMILY MEDICINE

## 2021-04-27 RX ORDER — CELECOXIB 200 MG/1
200 CAPSULE ORAL 2 TIMES DAILY
Qty: 60 CAPSULE | Refills: 0 | Status: SHIPPED | OUTPATIENT
Start: 2021-04-27 | End: 2022-01-11

## 2021-04-27 NOTE — PROGRESS NOTES
CHIEF COMPLAINT: Patient presents with:  Pain: lower back pain         HPI:     Cassandra Bustillos is a 69year old female presents for low back pain that started in November 2020 insidiously. The pain is midline and nonradiating at a level of L4 L5-S1.   It Cancer Father 64        gallbladder   • Arthritis Sister    • Hypertension Sister    • Arthritis Brother         x2 brothers   • Hypertension Brother    • No Known Problems Maternal Grandmother    • No Known Problems Maternal Grandfather    • No Known Prob atraumatic. Sclera clear and non icteric bilaterally. Moist mucous membranes. Neck: supple. No adenopathy. No thyromegaly. Neck nontender with normal range of motion. Heart: RRR without S3 or S4 murmur .  Clear S1S2  Lungs: clear to auscultation bilater (MIN 4 VIEWS) (CPT=72110); Future  - XR LUMBAR SPINE (MIN 4 VIEWS) (CPT=72110)  - PHYSICAL THERAPY EXTERNAL  - celecoxib (CELEBREX) 200 MG Oral Cap; Take 1 capsule (200 mg total) by mouth 2 (two) times daily. Dispense: 60 capsule;  Refill: 0  Neurovascular without serious comorbidity with body mass index (BMI) of 32.0 to 32.9 in adult     History of total right knee replacement     History of cellulitis     Postoperative nausea and vomiting     Varicosities     Obesity (BMI 30.0-34. 9)     Primary osteoarthri

## 2021-04-28 RX ORDER — CELECOXIB 200 MG/1
CAPSULE ORAL
Qty: 180 CAPSULE | Refills: 0 | OUTPATIENT
Start: 2021-04-28

## 2021-04-28 NOTE — TELEPHONE ENCOUNTER
Pt requesting refill of   Requested Prescriptions     Pending Prescriptions Disp Refills   • CELECOXIB 200 MG Oral Cap [Pharmacy Med Name: CELECOXIB 200MG CAPSULES] 180 capsule 0     Sig: TAKE 1 CAPSULE(200 MG) BY MOUTH TWICE DAILY     90 day supply denied

## 2021-05-07 ENCOUNTER — TELEPHONE (OUTPATIENT)
Dept: FAMILY MEDICINE CLINIC | Facility: CLINIC | Age: 71
End: 2021-05-07

## 2021-05-07 NOTE — TELEPHONE ENCOUNTER
Incoming fax received from [facility] - Initial Evaluation  Reviewed and signed by Schuyler Ravi DO   [phone]  Confirmation received.    Sent to scan

## 2021-05-27 ENCOUNTER — OFFICE VISIT (OUTPATIENT)
Dept: FAMILY MEDICINE CLINIC | Facility: CLINIC | Age: 71
End: 2021-05-27
Payer: COMMERCIAL

## 2021-05-27 VITALS
RESPIRATION RATE: 18 BRPM | OXYGEN SATURATION: 98 % | HEIGHT: 65 IN | WEIGHT: 207 LBS | SYSTOLIC BLOOD PRESSURE: 138 MMHG | DIASTOLIC BLOOD PRESSURE: 60 MMHG | BODY MASS INDEX: 34.49 KG/M2 | HEART RATE: 93 BPM | TEMPERATURE: 98 F

## 2021-05-27 DIAGNOSIS — I10 ESSENTIAL HYPERTENSION, BENIGN: ICD-10-CM

## 2021-05-27 DIAGNOSIS — M54.50 CHRONIC MIDLINE LOW BACK PAIN WITHOUT SCIATICA: Primary | ICD-10-CM

## 2021-05-27 DIAGNOSIS — G89.29 CHRONIC MIDLINE LOW BACK PAIN WITHOUT SCIATICA: Primary | ICD-10-CM

## 2021-05-27 PROCEDURE — 99214 OFFICE O/P EST MOD 30 MIN: CPT | Performed by: FAMILY MEDICINE

## 2021-05-27 PROCEDURE — 3008F BODY MASS INDEX DOCD: CPT | Performed by: FAMILY MEDICINE

## 2021-05-27 PROCEDURE — 3075F SYST BP GE 130 - 139MM HG: CPT | Performed by: FAMILY MEDICINE

## 2021-05-27 PROCEDURE — 3078F DIAST BP <80 MM HG: CPT | Performed by: FAMILY MEDICINE

## 2021-05-27 RX ORDER — AMLODIPINE BESYLATE 5 MG/1
7.5 TABLET ORAL DAILY
Qty: 135 TABLET | Refills: 1 | Status: SHIPPED | OUTPATIENT
Start: 2021-05-27 | End: 2021-12-30

## 2021-05-27 RX ORDER — OLMESARTAN MEDOXOMIL 40 MG/1
TABLET ORAL
Qty: 90 TABLET | Refills: 1 | Status: SHIPPED | OUTPATIENT
Start: 2021-05-27 | End: 2022-01-11

## 2021-05-27 NOTE — PROGRESS NOTES
CHIEF COMPLAINT: Patient presents with:  Pain: lower back pain follow up     HPI:     Laly Abarca is a 69year old female presents for follow-up low back pain-states of improvement. Is affecting function.   Goes to the grocery store and can barely tabitha minutes of walking. She did see a chiropractor who did a few sessions but thought it made it worse and decided to come into the office. She has taken Tylenol arthritis with partial relief of pain but not complete.   Denies any prior history of chronic tamanna Glasses of wine per week      Comment: 2 WINE/D    Drug use: No       Medications (Active prior to today's visit):  Current Outpatient Medications   Medication Sig Dispense Refill   • celecoxib (CELEBREX) 200 MG Oral Cap Take 1 capsule (200 mg total) by mo +2 bilaterally. Skin: no rashes. No significant pedal edema. No cellulitis   Neuro: AOx3. Sensation intact.  Motor exam - normal  Back exam:   Perithoracic tenderness: no  Perilumbar tenderness: no bilateral  Perilumbar muscle spasm and tenderness:  No, Electronically Signed: 04/30/2021 01:58 PM      Office Visit on 03/05/2021   Component Date Value   • Glucose 03/05/2021 93    • Sodium 03/05/2021 137    • Potassium 03/05/2021 4.7    • Chloride 03/05/2021 104    • CO2 03/05/2021 27.0    • Anion Gap 03/05/ This Visit:  Requested Prescriptions     Pending Prescriptions Disp Refills   • Olmesartan Medoxomil 40 MG Oral Tab 90 tablet 0     Sig: TAKE 1 TABLET BY MOUTH EVERY DAY   • amLODIPine Besylate 5 MG Oral Tab 135 tablet 0     Sig: Take 1.5 tablets (7.5 mg t

## 2021-06-04 ENCOUNTER — TELEPHONE (OUTPATIENT)
Dept: INTERNAL MEDICINE CLINIC | Facility: CLINIC | Age: 71
End: 2021-06-04

## 2021-06-08 ENCOUNTER — OFFICE VISIT (OUTPATIENT)
Dept: INTERNAL MEDICINE CLINIC | Facility: CLINIC | Age: 71
End: 2021-06-08
Payer: COMMERCIAL

## 2021-06-08 VITALS
DIASTOLIC BLOOD PRESSURE: 70 MMHG | WEIGHT: 208 LBS | RESPIRATION RATE: 16 BRPM | OXYGEN SATURATION: 98 % | HEIGHT: 65 IN | SYSTOLIC BLOOD PRESSURE: 128 MMHG | BODY MASS INDEX: 34.66 KG/M2 | HEART RATE: 94 BPM

## 2021-06-08 DIAGNOSIS — G89.29 CHRONIC MIDLINE LOW BACK PAIN WITHOUT SCIATICA: ICD-10-CM

## 2021-06-08 DIAGNOSIS — Z51.81 ENCOUNTER FOR THERAPEUTIC DRUG MONITORING: Primary | ICD-10-CM

## 2021-06-08 DIAGNOSIS — E66.9 OBESITY (BMI 30.0-34.9): ICD-10-CM

## 2021-06-08 DIAGNOSIS — I10 ESSENTIAL HYPERTENSION: ICD-10-CM

## 2021-06-08 DIAGNOSIS — M54.50 CHRONIC MIDLINE LOW BACK PAIN WITHOUT SCIATICA: ICD-10-CM

## 2021-06-08 DIAGNOSIS — E55.9 VITAMIN D DEFICIENCY: ICD-10-CM

## 2021-06-08 PROCEDURE — 3074F SYST BP LT 130 MM HG: CPT | Performed by: NURSE PRACTITIONER

## 2021-06-08 PROCEDURE — 3008F BODY MASS INDEX DOCD: CPT | Performed by: NURSE PRACTITIONER

## 2021-06-08 PROCEDURE — 3078F DIAST BP <80 MM HG: CPT | Performed by: NURSE PRACTITIONER

## 2021-06-08 PROCEDURE — 99214 OFFICE O/P EST MOD 30 MIN: CPT | Performed by: NURSE PRACTITIONER

## 2021-06-08 NOTE — PATIENT INSTRUCTIONS
We are here to support you with weight loss, but please remember that you still need your primary care provider for your routine health maintenance.       PLAN:  Start taking plenity (check it out online)  Pre-made meals: factor 75, freshly, hello fresh, me exercise towards calorie number per day)                   ** Daily INPUT> Look at nutrition section-- \"nutrients\" and it will break down your macros for the day (ie. Protein, carbs, fibers, sugars and fats). Try to stay within these numbers daily     2.

## 2021-06-10 ENCOUNTER — TELEPHONE (OUTPATIENT)
Dept: FAMILY MEDICINE CLINIC | Facility: CLINIC | Age: 71
End: 2021-06-10

## 2021-06-10 NOTE — TELEPHONE ENCOUNTER
Received progress notes from [facility]. Date of service 5/28/21. Lynne Ennis DO reviewed and signed. Faxed with confirmation. Copy sent to scan.

## 2021-06-13 ENCOUNTER — PATIENT MESSAGE (OUTPATIENT)
Dept: FAMILY MEDICINE CLINIC | Facility: CLINIC | Age: 71
End: 2021-06-13

## 2021-06-13 DIAGNOSIS — G89.29 CHRONIC MIDLINE LOW BACK PAIN WITHOUT SCIATICA: Primary | ICD-10-CM

## 2021-06-13 DIAGNOSIS — M54.50 LOW BACK PAIN POTENTIALLY ASSOCIATED WITH SPINAL STENOSIS: ICD-10-CM

## 2021-06-13 DIAGNOSIS — M54.50 CHRONIC MIDLINE LOW BACK PAIN WITHOUT SCIATICA: Primary | ICD-10-CM

## 2021-06-14 NOTE — TELEPHONE ENCOUNTER
From: Ivan Barajas  To: Josef Pacheco DO  Sent: 6/13/2021 8:28 AM CDT  Subject: Visit Follow-up Question    Good Morning Dr. Royal Grayson,  I completed the MRI on June 7th and not sure how to read the results.  Would you review the results and let me know

## 2021-06-15 ENCOUNTER — PATIENT MESSAGE (OUTPATIENT)
Dept: FAMILY MEDICINE CLINIC | Facility: CLINIC | Age: 71
End: 2021-06-15

## 2021-06-15 NOTE — TELEPHONE ENCOUNTER
From: Pb Grajeda  To: Schuyler Ravi DO  Sent: 6/15/2021 3:02 PM CDT  Subject: Other    Thank you Dr. Yamileth Ramirez - I appreciate your review and will follow-up with Dr. Matias Espinoza.      Pablo Kline

## 2021-06-15 NOTE — PROGRESS NOTES
Released to [facility]:    Dear Zahida Reza,    I have reviewed your test results. Degenerative changes/arthritis throughout the spine.   L3-L4 there is disc bulging and moderate central canal stenosis which could possibly be contributing to your symptom

## 2021-06-15 NOTE — TELEPHONE ENCOUNTER
Degenerative changes  With moderate spinal canal stenosis at L3/4 recommend consult for neurosurgery. Stop PT for now and chiropractic visits. Await recommendations from neurosurgeon    Please inform.     Dr. Dylan Najera  Neurosurgery    Phone: [phone]

## 2021-10-13 ENCOUNTER — TELEPHONE (OUTPATIENT)
Dept: INTERNAL MEDICINE CLINIC | Facility: CLINIC | Age: 71
End: 2021-10-13

## 2021-10-13 DIAGNOSIS — Z51.81 ENCOUNTER FOR THERAPEUTIC DRUG MONITORING: ICD-10-CM

## 2021-10-13 DIAGNOSIS — E66.9 OBESITY (BMI 30.0-34.9): ICD-10-CM

## 2021-10-13 RX ORDER — CARBOXYMETHYLCELLULOSE/CITRIC 0.75 G
3 CAPSULE ORAL DAILY
Qty: 180 CAPSULE | Refills: 2 | Status: SHIPPED | OUTPATIENT
Start: 2021-10-13

## 2021-10-13 NOTE — TELEPHONE ENCOUNTER
Requesting plentity  LOV: 6/8/21  RTC: 5 weeks  Last Relevant Labs:   Filled: 6/8/21 #168 with 3  refills    Future Appointments   Date Time Provider Checo Rea   11/12/2021 10:40 AM MD NIKITA Palomo AT

## 2021-11-26 DIAGNOSIS — I10 ESSENTIAL HYPERTENSION, BENIGN: ICD-10-CM

## 2021-11-29 RX ORDER — OLMESARTAN MEDOXOMIL 40 MG/1
TABLET ORAL
Qty: 90 TABLET | Refills: 1 | OUTPATIENT
Start: 2021-11-29

## 2021-11-29 NOTE — TELEPHONE ENCOUNTER
Refill Failed Protocol:     Pt requesting refill of   Requested Prescriptions     Pending Prescriptions Disp Refills   • Olmesartan Medoxomil 40 MG Oral Tab 90 tablet 1     Sig: TAKE 1 TABLET BY MOUTH EVERY DAY         Last Time Medication was Filled:  05/

## 2021-12-04 DIAGNOSIS — I10 ESSENTIAL HYPERTENSION, BENIGN: ICD-10-CM

## 2021-12-06 RX ORDER — OLMESARTAN MEDOXOMIL 40 MG/1
TABLET ORAL
Qty: 90 TABLET | Refills: 1 | OUTPATIENT
Start: 2021-12-06

## 2021-12-06 NOTE — TELEPHONE ENCOUNTER
Duplicate Refill:      Pt requesting refill of   Requested Prescriptions     Pending Prescriptions Disp Refills   • OLMESARTAN MEDOXOMIL 40 MG Oral Tab [Pharmacy Med Name: OLMESARTAN MEDOXOMIL 40MG TABLETS] 90 tablet 1     Sig: TAKE 1 TABLET BY MOUTH YONI

## 2021-12-30 DIAGNOSIS — I10 ESSENTIAL HYPERTENSION, BENIGN: ICD-10-CM

## 2021-12-30 RX ORDER — AMLODIPINE BESYLATE 5 MG/1
7.5 TABLET ORAL DAILY
Qty: 135 TABLET | Refills: 1 | Status: SHIPPED | OUTPATIENT
Start: 2021-12-30

## 2021-12-30 NOTE — TELEPHONE ENCOUNTER
Refill Passed Protocol:     Pt requesting refill of   Requested Prescriptions     Pending Prescriptions Disp Refills   • amLODIPine 5 MG Oral Tab 135 tablet 1     Sig: Take 1.5 tablets (7.5 mg total) by mouth daily.          Refill was approved and sent to

## 2022-01-10 RX ORDER — AMOXICILLIN 500 MG/1
CAPSULE ORAL
COMMUNITY
Start: 2021-09-27

## 2022-01-11 ENCOUNTER — OFFICE VISIT (OUTPATIENT)
Dept: FAMILY MEDICINE CLINIC | Facility: CLINIC | Age: 72
End: 2022-01-11
Payer: COMMERCIAL

## 2022-01-11 VITALS
OXYGEN SATURATION: 97 % | HEART RATE: 87 BPM | WEIGHT: 210.81 LBS | BODY MASS INDEX: 35.12 KG/M2 | RESPIRATION RATE: 18 BRPM | TEMPERATURE: 97 F | DIASTOLIC BLOOD PRESSURE: 70 MMHG | HEIGHT: 65 IN | SYSTOLIC BLOOD PRESSURE: 126 MMHG

## 2022-01-11 DIAGNOSIS — Z12.31 ENCOUNTER FOR SCREENING MAMMOGRAM FOR BREAST CANCER: ICD-10-CM

## 2022-01-11 DIAGNOSIS — I10 ESSENTIAL HYPERTENSION, BENIGN: Primary | ICD-10-CM

## 2022-01-11 DIAGNOSIS — R60.9 DEPENDENT EDEMA: ICD-10-CM

## 2022-01-11 LAB
ANION GAP SERPL CALC-SCNC: 10 MMOL/L (ref 0–18)
BUN BLD-MCNC: 13 MG/DL (ref 7–18)
CALCIUM BLD-MCNC: 9.6 MG/DL (ref 8.5–10.1)
CHLORIDE SERPL-SCNC: 98 MMOL/L (ref 98–112)
CO2 SERPL-SCNC: 24 MMOL/L (ref 21–32)
CREAT BLD-MCNC: 0.83 MG/DL
FASTING STATUS PATIENT QL REPORTED: YES
GLUCOSE BLD-MCNC: 93 MG/DL (ref 70–99)
OSMOLALITY SERPL CALC.SUM OF ELEC: 274 MOSM/KG (ref 275–295)
POTASSIUM SERPL-SCNC: 4.8 MMOL/L (ref 3.5–5.1)
SODIUM SERPL-SCNC: 132 MMOL/L (ref 136–145)

## 2022-01-11 PROCEDURE — 3008F BODY MASS INDEX DOCD: CPT | Performed by: FAMILY MEDICINE

## 2022-01-11 PROCEDURE — 3074F SYST BP LT 130 MM HG: CPT | Performed by: FAMILY MEDICINE

## 2022-01-11 PROCEDURE — 3078F DIAST BP <80 MM HG: CPT | Performed by: FAMILY MEDICINE

## 2022-01-11 PROCEDURE — 36415 COLL VENOUS BLD VENIPUNCTURE: CPT | Performed by: FAMILY MEDICINE

## 2022-01-11 PROCEDURE — 80048 BASIC METABOLIC PNL TOTAL CA: CPT | Performed by: FAMILY MEDICINE

## 2022-01-11 PROCEDURE — 99214 OFFICE O/P EST MOD 30 MIN: CPT | Performed by: FAMILY MEDICINE

## 2022-01-11 RX ORDER — OLMESARTAN MEDOXOMIL 40 MG/1
TABLET ORAL
Qty: 90 TABLET | Refills: 1 | Status: SHIPPED | OUTPATIENT
Start: 2022-01-11

## 2022-01-11 NOTE — PROGRESS NOTES
Patient came in for draw of ordered fasting labs. Patient drawn out of L AC, x 1 attempt and tolerated well.  1 gold tube drawn.

## 2022-01-11 NOTE — PROGRESS NOTES
CHIEF COMPLAINT: Patient presents with:  Medication Follow-Up: HTN       HPI:     Flores Zabala is a 66year old female presents for presents for blood pressure recheck. HTN x 30 years. Taking olmesartan and amlodipine. Needs refill on olmesartan.   Pa Psychiatric Mother         DEMENTIA   • Hypertension Daughter    • Other (parkinson) Son    • Hypertension Son    • Cancer Father 64        gallbladder   • Arthritis Sister    • Hypertension Sister    • Arthritis Brother         x2 brothers   • Hypertensio Take 2 tablets (1,300 mg total) by mouth every 8 (eight) hours as needed for Pain (Max is 6 tablets in 24 hours or 4000 mg of Tylenol/acetaminophen in 24 hours).  Buy OTC 1 Package 0   • Cholecalciferol (VITAMIN D3) 50 MCG (2000 UT) Oral Tab Take 1 tablet b medication  Labs q 6 months    2. Encounter for screening mammogram for breast cancer  - [facility] NEERU 2D+3D SCREENING BILAT (CPT=77067/10271); Future    3. Dependent edema  Work on weight loss, maintain activity.   The valves are damaged and veins, there is no midline low back pain without sciatica     BMI 34.0-34.9,adult      Imaging & Referrals:  None     1/11/2022  Rebecca [place], DO      Patient understands plan and follow-up.   Return in about 4 months (around 5/11/2022) for annual physical, medication monito

## 2022-01-19 DIAGNOSIS — E87.1 HYPONATREMIA: Primary | ICD-10-CM

## 2022-02-25 ENCOUNTER — MED REC SCAN ONLY (OUTPATIENT)
Dept: FAMILY MEDICINE CLINIC | Facility: CLINIC | Age: 72
End: 2022-02-25

## 2022-03-04 ENCOUNTER — TELEPHONE (OUTPATIENT)
Dept: FAMILY MEDICINE CLINIC | Facility: CLINIC | Age: 72
End: 2022-03-04

## 2022-03-04 NOTE — TELEPHONE ENCOUNTER
Pt just had bloodwork done last month and got a message saying she needed more done, wondering if that's accurate.

## 2022-03-04 NOTE — TELEPHONE ENCOUNTER
Spoke to pt and let her know sodium level was low on labs from January and it was recommended she repeat labs and a urine sample  Nurse visit scheduled for 3/7/22

## 2022-03-07 ENCOUNTER — NURSE ONLY (OUTPATIENT)
Dept: FAMILY MEDICINE CLINIC | Facility: CLINIC | Age: 72
End: 2022-03-07
Payer: COMMERCIAL

## 2022-03-07 DIAGNOSIS — E87.1 HYPONATREMIA: ICD-10-CM

## 2022-03-07 LAB
ANION GAP SERPL CALC-SCNC: 5 MMOL/L (ref 0–18)
BUN BLD-MCNC: 10 MG/DL (ref 7–18)
CALCIUM BLD-MCNC: 9.2 MG/DL (ref 8.5–10.1)
CHLORIDE SERPL-SCNC: 103 MMOL/L (ref 98–112)
CO2 SERPL-SCNC: 28 MMOL/L (ref 21–32)
CREAT BLD-MCNC: 0.84 MG/DL
FASTING STATUS PATIENT QL REPORTED: YES
GLUCOSE BLD-MCNC: 101 MG/DL (ref 70–99)
OSMOLALITY SERPL CALC.SUM OF ELEC: 281 MOSM/KG (ref 275–295)
POTASSIUM SERPL-SCNC: 4.5 MMOL/L (ref 3.5–5.1)
SODIUM SERPL-SCNC: 136 MMOL/L (ref 136–145)
SODIUM SERPL-SCNC: 62 MMOL/L

## 2022-03-07 PROCEDURE — 80048 BASIC METABOLIC PNL TOTAL CA: CPT | Performed by: FAMILY MEDICINE

## 2022-03-07 PROCEDURE — 36415 COLL VENOUS BLD VENIPUNCTURE: CPT | Performed by: FAMILY MEDICINE

## 2022-03-07 PROCEDURE — 84300 ASSAY OF URINE SODIUM: CPT | Performed by: FAMILY MEDICINE

## 2022-03-07 NOTE — PROGRESS NOTES
Patient came in for draw of ordered fasting labs. Patient drawn out of L AC, x 1 attempt and tolerated well.  1 gold tube drawn. Urine sample collected.

## 2022-03-09 ENCOUNTER — ORDER TRANSCRIPTION (OUTPATIENT)
Dept: PHYSICAL THERAPY | Facility: HOSPITAL | Age: 72
End: 2022-03-09

## 2022-03-09 ENCOUNTER — TELEPHONE (OUTPATIENT)
Dept: PHYSICAL THERAPY | Facility: HOSPITAL | Age: 72
End: 2022-03-09

## 2022-03-11 PROBLEM — R82.998 HIGH URINE SODIUM: Status: ACTIVE | Noted: 2022-03-11

## 2022-03-28 ENCOUNTER — OFFICE VISIT (OUTPATIENT)
Dept: PHYSICAL THERAPY | Facility: HOSPITAL | Age: 72
End: 2022-03-28
Attending: ORTHOPAEDIC SURGERY
Payer: COMMERCIAL

## 2022-03-28 DIAGNOSIS — M48.062 SPINAL STENOSIS OF LUMBAR REGION WITH NEUROGENIC CLAUDICATION: ICD-10-CM

## 2022-03-28 PROCEDURE — 97110 THERAPEUTIC EXERCISES: CPT

## 2022-03-28 PROCEDURE — 97162 PT EVAL MOD COMPLEX 30 MIN: CPT

## 2022-03-29 NOTE — PROGRESS NOTES
HISTORY OF PRESENT ILLNESS  Patient presents with:  Weight Check: Up 10lbs    Cary Oro is a 69year old female here for follow up with medical weight loss program for the treatment of overweight, obesity, or morbid obesity.      Up #10 lbs  Hasn't b Report given at this time to Suresh Troncoso, [facility]. Care transferred at this time.       Conor Russo RN  03/29/22 0711 5\" (1.651 m)   Wt 208 lb (94.3 kg)   SpO2 98%   BMI 34.61 kg/m²       GENERAL: well developed, well nourished, in no apparent distress, A/O x3  SKIN: no rashes, no suspicious lesions  HEENT: conjunctiva pink, sclera non icteric, PERRLA  NECK: supple, no a Cholecalciferol (VITAMIN D3) 50 MCG (2000 UT) Oral Tab, Take 1 tablet by mouth daily. Buy OTC every  days, Disp: 90 tablet, Rfl: 3    No current facility-administered medications on file prior to visit.       ASSESSMENT/PLAN  (Z51.81) Encounter for t signed. Total time spent on chart review, pre-charting, obtaining history, counseling, and educating, reviewing labs was 32 minutes.      Patient Instructions   We are here to support you with weight loss, but please remember that you still need your amanuel need to adjust settings:                Goals should include:                  Lose 1.5-2 lbs per week                Activity level: not very active (can't count exercise towards calorie number per day)                   ** Daily INPUT> Look at nutrition s

## 2022-03-30 ENCOUNTER — APPOINTMENT (OUTPATIENT)
Dept: PHYSICAL THERAPY | Facility: HOSPITAL | Age: 72
End: 2022-03-30
Attending: ORTHOPAEDIC SURGERY
Payer: COMMERCIAL

## 2022-04-01 ENCOUNTER — OFFICE VISIT (OUTPATIENT)
Dept: PHYSICAL THERAPY | Facility: HOSPITAL | Age: 72
End: 2022-04-01
Attending: ORTHOPAEDIC SURGERY
Payer: COMMERCIAL

## 2022-04-01 PROCEDURE — 97110 THERAPEUTIC EXERCISES: CPT

## 2022-04-01 PROCEDURE — 97140 MANUAL THERAPY 1/> REGIONS: CPT

## 2022-04-01 NOTE — PROGRESS NOTES
Dx:   Lumbar spinal stenosis with neurogenic claudication. Jen Gonsalez (Authorized # of Visits):  6           Authorizing Physician: Dr. Israel Green  Next MD visit: ?  Fall Risk: standard         Precautions: n/a             Subjective:  Very sharp right low back pain is rated 9/10 this afternoon, and was rated a 10/10 yesterday afternoon. Notes taking Hydrocodone prn. Has not used heat or cold recently. Notes pain/difficulty with walking and with standing. Notes that she has a stationary bike at home that she's planning on using. Pain: 9/10. Objective: see treatment flow sheet. Home exercises are being performed correctly. Assessment: tolerated exercises well with reasonable breaks between exercises. Goals:  (to be met in 8 visits)   Pt will report walking tolerance of at least 15-20 minutes. Pt will report standing tolerance with ADLs of at least 15-20 minutes. Pt will report improved ability to lift/carry things. Pt will be independent with an upgraded HEP. Plan: as noted in initial evaluation. Date: 4/1/2022  TX#: 2/8 Date:                 TX#: 3/ Date:                 TX#: 4/ Date:                 TX#: 5/ Date: Tx#: 6/   NuStep (7/8) load 4 for 5 minutes        DKTC with heels on SB 2 x 10. LTR with calves on SB x 10 l/r for 2 sets. Bridging with calves on SB 2 x 10. Supine piriformis stretching by PT with SB assist for 30 seconds each leg. PPT while hook lying with ball squeezing 5 second holds x 10 reps. Supine clam shells with blue band around knees 2 x 10. Alt HF while hook lying 2 x 5 each leg. While left side lying:   stm right TLS area by PT. Grade II lumbar rotation by PT for 2 minutes. R TL OH stretch for 20 seconds x 2. Seated sciatic tensioners x 10 l/r. Jen Oneal TNE was given during today's session.                HEP: LTR hook lying; SKTC, PPT, supine piriformis stretch, supine clams with red band, and bridging.      Charges: Ex2,Man1       Total Timed Treatment: 42 min  Total Treatment Time: 42 min

## 2022-04-04 ENCOUNTER — OFFICE VISIT (OUTPATIENT)
Dept: PHYSICAL THERAPY | Facility: HOSPITAL | Age: 72
End: 2022-04-04
Attending: ORTHOPAEDIC SURGERY
Payer: COMMERCIAL

## 2022-04-04 ENCOUNTER — OFFICE VISIT (OUTPATIENT)
Dept: INTERNAL MEDICINE CLINIC | Facility: CLINIC | Age: 72
End: 2022-04-04
Payer: COMMERCIAL

## 2022-04-04 VITALS
RESPIRATION RATE: 17 BRPM | DIASTOLIC BLOOD PRESSURE: 60 MMHG | HEIGHT: 65 IN | BODY MASS INDEX: 34.99 KG/M2 | SYSTOLIC BLOOD PRESSURE: 124 MMHG | HEART RATE: 104 BPM | OXYGEN SATURATION: 98 % | WEIGHT: 210 LBS

## 2022-04-04 DIAGNOSIS — E66.9 OBESITY (BMI 30.0-34.9): ICD-10-CM

## 2022-04-04 DIAGNOSIS — E55.9 VITAMIN D DEFICIENCY: ICD-10-CM

## 2022-04-04 DIAGNOSIS — I10 ESSENTIAL HYPERTENSION, BENIGN: ICD-10-CM

## 2022-04-04 DIAGNOSIS — I10 ESSENTIAL HYPERTENSION: ICD-10-CM

## 2022-04-04 DIAGNOSIS — E53.8 VITAMIN B12 DEFICIENCY: ICD-10-CM

## 2022-04-04 DIAGNOSIS — Z51.81 ENCOUNTER FOR THERAPEUTIC DRUG MONITORING: Primary | ICD-10-CM

## 2022-04-04 PROCEDURE — 99214 OFFICE O/P EST MOD 30 MIN: CPT | Performed by: NURSE PRACTITIONER

## 2022-04-04 PROCEDURE — 97110 THERAPEUTIC EXERCISES: CPT

## 2022-04-04 PROCEDURE — 3074F SYST BP LT 130 MM HG: CPT | Performed by: NURSE PRACTITIONER

## 2022-04-04 PROCEDURE — 3078F DIAST BP <80 MM HG: CPT | Performed by: NURSE PRACTITIONER

## 2022-04-04 PROCEDURE — 3008F BODY MASS INDEX DOCD: CPT | Performed by: NURSE PRACTITIONER

## 2022-04-04 NOTE — PROGRESS NOTES
Dx:   Lumbar spinal stenosis with neurogenic claudication. [employer] (Authorized # of Visits):  6           Authorizing Physician: Dr. Kvng Ledezma  Next MD visit: ?  Fall Risk: standard         Precautions: n/a          Pt was 10' late for today's appt.; thought it was for tomorrow. .       Subjective:  Sharp right low back pain is rated 9/10 this afternoon. Notes taking Hydrocodone prn. Has not used heat or cold recently. Notes pain/difficulty with walking > a few minutes and with standing > 10 minutes on average. .   Notes that she has a stationary bike at home that she's planning on using. Pain: 9/10. Objective: see treatment flow sheet. Home exercises are being performed correctly. Assessment: tolerated exercises well with reasonable breaks between exercises. Goals:  (to be met in 8 visits)   Pt will report walking tolerance of at least 15-20 minutes. Pt will report standing tolerance with ADLs of at least 15-20 minutes. Pt will report improved ability to lift/carry things. Pt will be independent with an upgraded HEP. Plan: as noted in initial evaluation. Date: 4/1/2022  TX#: 2/8 Date: 4/4/22                TX#: 3/8 Date:                 TX#: 4/ Date:                 TX#: 5/ Date: Tx#: 6/   NuStep (7/8) load 4 for 5 minutes  NuStep load 4 for 5 minutes. DKTC with heels on SB 2 x 10.  2 x 10. LTR with calves on SB x 10 l/r for 2 sets. LTR x 20 l/r. ... Bridging with calves on SB 2 x 10. Bridging with calves on SB x 10 x 2 in a comfortable range. Supine piriformis stretching by PT with SB assist for 30 seconds each leg. Supine piriformis stretching by PT with SB assist for 45 seconds each leg. PPT while hook lying with ball squeezing 5 second holds x 10 reps. X 10 reps. .      Supine clam shells with blue band around knees 2 x 10. With grey band 2 x 10. Alt HF while hook lying 2 x 5 each leg. 2 x 5 each leg.        While left side lying:   stm right TLS area by PT. Grade II lumbar rotation by PT for 2 minutes. R TL OH stretch for 20 seconds x 2. While tall sitting:     B SR with grey band x 10- reps. .. Seated sciatic tensioners x 10 l/r. . X 10 l/r. Chapincito Lipoma TNE was given during today's session. [employer] balance board a/p x 20.       4\" LSU x 10 l/r. ... HEP: LTR hook lying; SKTC, PPT, supine piriformis stretch, supine clams with red band, and bridging. Charges: Ex2.       Total Timed Treatment: 34 min  Total Treatment Time: 34 min

## 2022-04-04 NOTE — PATIENT INSTRUCTIONS
We are here to support you with weight loss, but please remember that you still need your primary care provider for your routine health maintenance. PLAN:  Will restart plenity (3 tablets with lunch) and 3 tablets with dinner)   Can try interm. Fasting (but make sure you eat lunch)  Fit foundation- local lady  Try out some pre-made meal options: shane mead MD, metabolic meals, Factor 75, Freshly   Follow up with me as needed   Schedule follow up appointments: Joseluis Arellano (dietitian) or Katlyn Koenig (presurgery dietitian)   Check for insurance coverage for dietitian and labwork prior to scheduling appointment. Please try to work on the following dietary changes:  1. Goals: Aim for 20-30 grams of protein/ meal--> OR 70-80g. Total per day  i. Aim for 110 grams of carbohydrates/day  ii. Eat 4-6 vegetables/day  iii. Avoid skipping meals- eat every 4-5 hours  iv. Aim for 3 meals/day  2. Drink lots of water and cut down on soda/juice consumption if soda/juice drinker  3. Focus on protein: (15-30 grams with each meal) ie. greek yogurt, cottage cheese, string cheese, hard boiled eggs  4. Healthy snacks: peanut butter and apples, hummus and carrots, berries, nuts (1/4 cup), tuna and crackers                 Protein Shakes: Premier protein or Core Power                Protein Bars: Rx Bars, Oatmega, Power Crunch                 [place] balanced breaks (cheese and nuts)- without chocolate  5. Reduce carbohydrates which includes sweets as well as rice, pasta, potatoes, bread, corn and instead choose whole grain options or more protein or vegetables (4-6 servings of vegetables per day)  6. Get a good night of sleep  7. Try to decrease stress in life     Please download apps:  1.  \"My Fitness Pal\" (other option is Lose it)) to help you to monitor daily dietary intake and you will be able to see if you are eating the right amount of calories, protein, carbs                With My Fitness Pal-->When you set-up the s[place]ia or need to adjust settings:                Goals should include: Lose 1.5-2 lbs per week                Activity level: not very active (can't count exercise towards calorie number per day)                   ** Daily INPUT> Look at nutrition section-- \"nutrients\" and it will break down your macros for the day (ie. Protein, carbs, fibers, sugars and fats). Try to stay within these numbers daily     2. \"7 minute workout\" to help with exercise/activity which takes 7 minutes of your day and that you can do at home! 3. \"Calm\" or \"Headspace\" which helps with mindfulness, meditation, clarity, sleep, and chichi to your daily life. 4. [employer] blog for healthy recipe ideas  5. [employer] for low carb resources    HIGH PROTEIN SNACK IDEAS  -cottage cheese  -plain yogurt  -kefir  -hard-boiled eggs  -natural cheeses  -nuts (measure portion size)   -unsweetened nut butters  -dried edamame   -aly seeds soaked in water or almond milk  -soy nuts  -cured meats (monitor for sodium issues)   -hummus with vegetables  -bean dip with vegetables     FRUIT  Low carb fruit options   Raspberries: Half a cup (60 grams) contains 3 grams of carbs. Blackberries: Half a cup (70 grams) contains 4 grams of carbs. Strawberries: Half a cup (100 grams) contains 6 grams of carbs. Blueberries: Half a cup (50 grams) contains 6 grams of carbs. Plum: One medium-sized (80 grams) contains 6 grams of carbs.      VEGETABLES  Low carb vegetables

## 2022-04-06 ENCOUNTER — APPOINTMENT (OUTPATIENT)
Dept: PHYSICAL THERAPY | Facility: HOSPITAL | Age: 72
End: 2022-04-06
Attending: ORTHOPAEDIC SURGERY
Payer: COMMERCIAL

## 2022-04-06 ENCOUNTER — TELEPHONE (OUTPATIENT)
Dept: PHYSICAL THERAPY | Facility: HOSPITAL | Age: 72
End: 2022-04-06

## 2022-04-06 PROCEDURE — 97110 THERAPEUTIC EXERCISES: CPT

## 2022-04-08 ENCOUNTER — TELEPHONE (OUTPATIENT)
Dept: PHYSICAL THERAPY | Facility: HOSPITAL | Age: 72
End: 2022-04-08

## 2022-04-11 ENCOUNTER — OFFICE VISIT (OUTPATIENT)
Dept: PHYSICAL THERAPY | Facility: HOSPITAL | Age: 72
End: 2022-04-11
Attending: ORTHOPAEDIC SURGERY
Payer: COMMERCIAL

## 2022-04-11 PROCEDURE — 97110 THERAPEUTIC EXERCISES: CPT

## 2022-04-12 ENCOUNTER — APPOINTMENT (OUTPATIENT)
Dept: PHYSICAL THERAPY | Facility: HOSPITAL | Age: 72
End: 2022-04-12
Payer: COMMERCIAL

## 2022-04-13 ENCOUNTER — APPOINTMENT (OUTPATIENT)
Dept: PHYSICAL THERAPY | Facility: HOSPITAL | Age: 72
End: 2022-04-13
Attending: ORTHOPAEDIC SURGERY
Payer: COMMERCIAL

## 2022-04-15 ENCOUNTER — APPOINTMENT (OUTPATIENT)
Dept: PHYSICAL THERAPY | Facility: HOSPITAL | Age: 72
End: 2022-04-15
Attending: ORTHOPAEDIC SURGERY
Payer: COMMERCIAL

## 2022-04-20 RX ORDER — HYDROCODONE BITARTRATE AND ACETAMINOPHEN 5; 325 MG/1; MG/1
1 TABLET ORAL EVERY 6 HOURS PRN
Status: ON HOLD | COMMUNITY
End: 2022-05-19

## 2022-04-22 ENCOUNTER — APPOINTMENT (OUTPATIENT)
Dept: PHYSICAL THERAPY | Facility: HOSPITAL | Age: 72
End: 2022-04-22
Attending: ORTHOPAEDIC SURGERY
Payer: COMMERCIAL

## 2022-04-25 ENCOUNTER — LABORATORY ENCOUNTER (OUTPATIENT)
Dept: LAB | Facility: HOSPITAL | Age: 72
End: 2022-04-25
Attending: ORTHOPAEDIC SURGERY
Payer: COMMERCIAL

## 2022-04-25 DIAGNOSIS — M48.061 FORAMINAL STENOSIS OF LUMBAR REGION: ICD-10-CM

## 2022-04-25 DIAGNOSIS — M41.9: ICD-10-CM

## 2022-04-25 LAB
ALBUMIN SERPL-MCNC: 3.7 G/DL (ref 3.4–5)
ALBUMIN/GLOB SERPL: 0.9 {RATIO} (ref 1–2)
ALP LIVER SERPL-CCNC: 99 U/L
ALT SERPL-CCNC: 79 U/L
ANION GAP SERPL CALC-SCNC: 9 MMOL/L (ref 0–18)
ANTIBODY SCREEN: NEGATIVE
APTT PPP: 27.4 SECONDS (ref 23.3–35.6)
AST SERPL-CCNC: 76 U/L (ref 15–37)
BASOPHILS # BLD AUTO: 0.05 X10(3) UL (ref 0–0.2)
BASOPHILS NFR BLD AUTO: 0.9 %
BILIRUB SERPL-MCNC: 0.6 MG/DL (ref 0.1–2)
BILIRUB UR QL STRIP.AUTO: NEGATIVE
BUN BLD-MCNC: 6 MG/DL (ref 7–18)
CALCIUM BLD-MCNC: 9.4 MG/DL (ref 8.5–10.1)
CHLORIDE SERPL-SCNC: 100 MMOL/L (ref 98–112)
CLARITY UR REFRACT.AUTO: CLEAR
CO2 SERPL-SCNC: 25 MMOL/L (ref 21–32)
COLOR UR AUTO: YELLOW
CREAT BLD-MCNC: 0.67 MG/DL
EOSINOPHIL # BLD AUTO: 0.1 X10(3) UL (ref 0–0.7)
EOSINOPHIL NFR BLD AUTO: 1.7 %
ERYTHROCYTE [DISTWIDTH] IN BLOOD BY AUTOMATED COUNT: 13.2 %
FASTING STATUS PATIENT QL REPORTED: YES
GLOBULIN PLAS-MCNC: 3.9 G/DL (ref 2.8–4.4)
GLUCOSE BLD-MCNC: 83 MG/DL (ref 70–99)
GLUCOSE UR STRIP.AUTO-MCNC: NEGATIVE MG/DL
HCT VFR BLD AUTO: 41.9 %
HGB BLD-MCNC: 14 G/DL
IMM GRANULOCYTES # BLD AUTO: 0.03 X10(3) UL (ref 0–1)
IMM GRANULOCYTES NFR BLD: 0.5 %
INR BLD: 1.05 (ref 0.8–1.2)
KETONES UR STRIP.AUTO-MCNC: NEGATIVE MG/DL
LYMPHOCYTES # BLD AUTO: 1.16 X10(3) UL (ref 1–4)
LYMPHOCYTES NFR BLD AUTO: 19.9 %
MCH RBC QN AUTO: 30.4 PG (ref 26–34)
MCHC RBC AUTO-ENTMCNC: 33.4 G/DL (ref 31–37)
MCV RBC AUTO: 91.1 FL
MONOCYTES # BLD AUTO: 0.44 X10(3) UL (ref 0.1–1)
MONOCYTES NFR BLD AUTO: 7.5 %
NEUTROPHILS # BLD AUTO: 4.06 X10 (3) UL (ref 1.5–7.7)
NEUTROPHILS # BLD AUTO: 4.06 X10(3) UL (ref 1.5–7.7)
NEUTROPHILS NFR BLD AUTO: 69.5 %
NITRITE UR QL STRIP.AUTO: NEGATIVE
OSMOLALITY SERPL CALC.SUM OF ELEC: 275 MOSM/KG (ref 275–295)
PH UR STRIP.AUTO: 5 [PH] (ref 5–8)
PLATELET # BLD AUTO: 192 10(3)UL (ref 150–450)
POTASSIUM SERPL-SCNC: 4.3 MMOL/L (ref 3.5–5.1)
PROT SERPL-MCNC: 7.6 G/DL (ref 6.4–8.2)
PROT UR STRIP.AUTO-MCNC: NEGATIVE MG/DL
PROTHROMBIN TIME: 13.8 SECONDS (ref 11.6–14.8)
RBC # BLD AUTO: 4.6 X10(6)UL
RBC UR QL AUTO: NEGATIVE
RH BLOOD TYPE: NEGATIVE
SODIUM SERPL-SCNC: 134 MMOL/L (ref 136–145)
SP GR UR STRIP.AUTO: 1.01 (ref 1–1.03)
UROBILINOGEN UR STRIP.AUTO-MCNC: <2 MG/DL
WBC # BLD AUTO: 5.8 X10(3) UL (ref 4–11)

## 2022-04-25 PROCEDURE — 85610 PROTHROMBIN TIME: CPT

## 2022-04-25 PROCEDURE — 86850 RBC ANTIBODY SCREEN: CPT

## 2022-04-25 PROCEDURE — 85025 COMPLETE CBC W/AUTO DIFF WBC: CPT

## 2022-04-25 PROCEDURE — 87086 URINE CULTURE/COLONY COUNT: CPT

## 2022-04-25 PROCEDURE — 87081 CULTURE SCREEN ONLY: CPT

## 2022-04-25 PROCEDURE — 85730 THROMBOPLASTIN TIME PARTIAL: CPT

## 2022-04-25 PROCEDURE — 81001 URINALYSIS AUTO W/SCOPE: CPT

## 2022-04-25 PROCEDURE — 86901 BLOOD TYPING SEROLOGIC RH(D): CPT

## 2022-04-25 PROCEDURE — 80053 COMPREHEN METABOLIC PANEL: CPT

## 2022-04-25 PROCEDURE — 36415 COLL VENOUS BLD VENIPUNCTURE: CPT

## 2022-04-25 PROCEDURE — 86900 BLOOD TYPING SEROLOGIC ABO: CPT

## 2022-04-26 ENCOUNTER — OFFICE VISIT (OUTPATIENT)
Dept: PHYSICAL THERAPY | Facility: HOSPITAL | Age: 72
End: 2022-04-26
Attending: ORTHOPAEDIC SURGERY
Payer: COMMERCIAL

## 2022-04-26 PROCEDURE — 97110 THERAPEUTIC EXERCISES: CPT

## 2022-04-26 NOTE — PROGRESS NOTES
Dx:   Lumbar spinal stenosis with neurogenic claudication. Dee Gonsalez (Authorized # of Visits):  6           Authorizing Physician: Dr. Regis Coreas  Next MD visit: ?  Fall Risk: standard         Precautions: n/a           Subjective: The patient reports that her right low back sharp  pain is 8/10 today. Also notes experiencing centralized thoracic spine aching pain is rated 5/10 currently. Notes taking Hydrocodone prn, but not recently. Has not taken Celebrex recently, either. Has not used heat or cold recently. Notes pain/difficulty with walking > 5-10 minutes without an assistive device such as a grocery cart, and with standing > 10 minutes on average. .   Notes that she has a stationary bike at home that she's used a few time for 5 minutes. Pain: 8/10. Objective: see treatment flow sheet. Home exercises are being performed correctly. Assessment: Continued with hip/core strengthening to improve tolerance for upright activities. Surgery is scheduled for 5/19/22. Goals:  (to be met in 8 visits)   Pt will report walking tolerance of at least 15-20 minutes. Pt will report standing tolerance with ADLs of at least 15-20 minutes. Pt will report improved ability to lift/carry things. Pt will be independent with an upgraded HEP. Plan: TL ROM, hip ROM, LE/core strengthening, education. .. Date: 4/1/2022  TX#: 2/8 Date: 4/4/22                TX#: 3/8 Date: 4/6/2022            TX#: 4/8 Date:  4/11/22               TX#: 5/8 Date: 4/26/22  Tx#: 6     NuStep (7/8) load 4 for 5 minutes  NuStep load 4 for 5 minutes. NuStep load 4 for 5 minutes. NuStep load 5 for 5 minutes. Load 5 for 5 minutes. DKTC with heels on SB 2 x 10.  2 x 10.  2x10 AA DKTC with SB 2 x 10. AA DKTC with SB 2 x 10. LTR with calves on SB x 10 l/r for 2 sets. LTR x 20 l/r. ... LTR x 20 l/r. ... LTR with calves on SB 2 x 10 l/r. Dee Garcia LTR with calves on SB 2 x 10 l/r. ..      Bridging with calves on SB 2 x 10.  Bridging with calves on SB x 10 x 2 in a comfortable range. Bridging with calves on SB x 10 x 2 in a comfortable range. 2 x 10. Bridging with calves on SB 2 x 10. Supine piriformis stretching by PT with SB assist for 30 seconds each leg. Supine piriformis stretching by PT with SB assist for 45 seconds each leg. Figure 4 piriformis stretch 3x30 sec ea By PT with SB assist for 45 seocnds By PT for 1 minute each leg. PPT while hook lying with ball squeezing 5 second holds x 10 reps. X 10 reps. . X 10 reps. . X 12 reps. Odella Edilson PPT while hook lying with ball squeezing x 15 reps. .. Supine clam shells with blue band around knees 2 x 10. With grey band 2 x 10. With grey band 2 x 10. 2 x 10.  3 x 8. Alt HF while hook lying 2 x 5 each leg. 2 x 5 each leg. 2 x 5 each leg. 2 x 5 left and right. X 10 each leg. While left side lying:   stm right TLS area by PT. Grade II lumbar rotation by PT for 2 minutes. R TL OH stretch for 20 seconds x 2. While tall sitting:     B SR with grey band x 10- reps. .. Standing hip abduction no resistance 3x6  Seated trunk flexion swiss ball 2x6, DC after 3 reps on 2nd set  Supine piriformis stretch 3x30 sec ea   See above for piriformis stretch. See above. Seated sciatic tensioners x 10 l/r. . X 10 l/r. . X 10 l/r. X 10 l/r. Odella Edilson X 12 each leg. TNE was given during today's session. Wooden balance board a/p x 20. Wooden balance board a/p x 20. X 20. X 20.       4\" LSU x 10 l/r. ... 6\" LSU x 10 l/r. X 10 l/r. Odella Edilson 6\" LSU x 12 l/r. .     X X Ice 10 min unbilled At home prn. .. -     HEP: LTR hook lying; SKTC, PPT, supine piriformis stretch, supine clams with red band, and bridging. Charges: Ex3.       Total Timed Treatment: 40 min  Total Treatment Time: 40 min

## 2022-04-27 ENCOUNTER — HOSPITAL ENCOUNTER (OUTPATIENT)
Dept: PHYSICAL THERAPY | Facility: HOSPITAL | Age: 72
Discharge: HOME | End: 2022-04-27
Attending: ORTHOPAEDIC SURGERY
Payer: COMMERCIAL

## 2022-04-27 DIAGNOSIS — M41.9: ICD-10-CM

## 2022-04-27 DIAGNOSIS — M48.061 FORAMINAL STENOSIS OF LUMBAR REGION: ICD-10-CM

## 2022-05-02 ENCOUNTER — OFFICE VISIT (OUTPATIENT)
Dept: FAMILY MEDICINE CLINIC | Facility: CLINIC | Age: 72
End: 2022-05-02
Payer: COMMERCIAL

## 2022-05-02 ENCOUNTER — TELEPHONE (OUTPATIENT)
Dept: FAMILY MEDICINE CLINIC | Facility: CLINIC | Age: 72
End: 2022-05-02

## 2022-05-02 VITALS
DIASTOLIC BLOOD PRESSURE: 70 MMHG | HEIGHT: 66 IN | HEART RATE: 89 BPM | SYSTOLIC BLOOD PRESSURE: 130 MMHG | WEIGHT: 206 LBS | RESPIRATION RATE: 16 BRPM | BODY MASS INDEX: 33.11 KG/M2 | OXYGEN SATURATION: 99 % | TEMPERATURE: 97 F

## 2022-05-02 DIAGNOSIS — Z22.321 MSSA (METHICILLIN-SUSCEPTIBLE STAPHYLOCOCCUS AUREUS) COLONIZATION: ICD-10-CM

## 2022-05-02 DIAGNOSIS — M15.9 PRIMARY OSTEOARTHRITIS INVOLVING MULTIPLE JOINTS: ICD-10-CM

## 2022-05-02 DIAGNOSIS — M17.11 PRIMARY OSTEOARTHRITIS OF RIGHT KNEE: ICD-10-CM

## 2022-05-02 DIAGNOSIS — I10 ESSENTIAL HYPERTENSION, BENIGN: ICD-10-CM

## 2022-05-02 DIAGNOSIS — M54.50 CHRONIC MIDLINE LOW BACK PAIN WITHOUT SCIATICA: ICD-10-CM

## 2022-05-02 DIAGNOSIS — R11.2 POSTOPERATIVE NAUSEA AND VOMITING: ICD-10-CM

## 2022-05-02 DIAGNOSIS — R74.8 ELEVATED LIVER ENZYMES: ICD-10-CM

## 2022-05-02 DIAGNOSIS — L30.9 DERMATITIS: ICD-10-CM

## 2022-05-02 DIAGNOSIS — Z01.818 PREOP EXAMINATION: Primary | ICD-10-CM

## 2022-05-02 DIAGNOSIS — E87.1 HYPONATREMIA: ICD-10-CM

## 2022-05-02 DIAGNOSIS — Z98.890 POSTOPERATIVE NAUSEA AND VOMITING: ICD-10-CM

## 2022-05-02 DIAGNOSIS — G89.29 CHRONIC MIDLINE LOW BACK PAIN WITHOUT SCIATICA: ICD-10-CM

## 2022-05-02 PROBLEM — R82.998 HIGH URINE SODIUM: Status: RESOLVED | Noted: 2022-03-11 | Resolved: 2022-05-02

## 2022-05-02 PROBLEM — A49.01 MSSA (METHICILLIN SUSCEPTIBLE STAPHYLOCOCCUS AUREUS): Status: ACTIVE | Noted: 2022-05-02

## 2022-05-02 LAB
ALBUMIN SERPL-MCNC: 4 G/DL (ref 3.4–5)
ALP LIVER SERPL-CCNC: 96 U/L
ALT SERPL-CCNC: 76 U/L
AST SERPL-CCNC: 56 U/L (ref 15–37)
BILIRUB DIRECT SERPL-MCNC: 0.2 MG/DL (ref 0–0.2)
BILIRUB SERPL-MCNC: 0.6 MG/DL (ref 0.1–2)
HAV IGM SER QL: NONREACTIVE
HBV CORE IGM SER QL: NONREACTIVE
HBV SURFACE AG SERPL QL IA: NONREACTIVE
HCV AB SERPL QL IA: NONREACTIVE
PROT SERPL-MCNC: 7.4 G/DL (ref 6.4–8.2)

## 2022-05-02 PROCEDURE — 80076 HEPATIC FUNCTION PANEL: CPT | Performed by: FAMILY MEDICINE

## 2022-05-02 PROCEDURE — 3008F BODY MASS INDEX DOCD: CPT | Performed by: FAMILY MEDICINE

## 2022-05-02 PROCEDURE — 80074 ACUTE HEPATITIS PANEL: CPT | Performed by: FAMILY MEDICINE

## 2022-05-02 PROCEDURE — 3078F DIAST BP <80 MM HG: CPT | Performed by: FAMILY MEDICINE

## 2022-05-02 PROCEDURE — 36415 COLL VENOUS BLD VENIPUNCTURE: CPT | Performed by: FAMILY MEDICINE

## 2022-05-02 PROCEDURE — 3075F SYST BP GE 130 - 139MM HG: CPT | Performed by: FAMILY MEDICINE

## 2022-05-02 RX ORDER — MUPIROCIN CALCIUM 20 MG/G
1 CREAM TOPICAL 2 TIMES DAILY
Qty: 30 G | Refills: 1 | Status: SHIPPED | OUTPATIENT
Start: 2022-05-02 | End: 2022-05-02

## 2022-05-02 NOTE — TELEPHONE ENCOUNTER
Mupirocin cream is not covered by pt insurance, pharmacist requesting to change to Mupirocin ointment for insurance coverage. Please advice if ok?

## 2022-05-02 NOTE — PROGRESS NOTES
Patient came in for draw of ordered labs. Patient drawn out of L AC, x 1 attempt and tolerated well.  2 gold tube drawn.

## 2022-05-03 NOTE — TELEPHONE ENCOUNTER
Rx mupirocin ointment sent to pharmacy.   Patient has colonization with MSSA preop please inform pharmacy and patient

## 2022-05-03 NOTE — TELEPHONE ENCOUNTER
Spoke to pt and let her know mupirocin ointment rx sent instead of cream. Patient voiced understanding and states the pharmacy has alerted her the rx is ready for .   She will obtain from pharmacy

## 2022-05-05 ENCOUNTER — OFFICE VISIT (OUTPATIENT)
Dept: PHYSICAL THERAPY | Facility: HOSPITAL | Age: 72
End: 2022-05-05
Attending: ORTHOPAEDIC SURGERY
Payer: COMMERCIAL

## 2022-05-05 PROCEDURE — 97110 THERAPEUTIC EXERCISES: CPT

## 2022-05-05 NOTE — PROGRESS NOTES
Dx:   Lumbar spinal stenosis with neurogenic claudication. Evangelist Edilsonlucas [employer] (Authorized # of Visits):  6           Authorizing Physician: Dr. Juaquin Lopez  Next MD visit: ?  Fall Risk: standard         Precautions: n/a           Subjective: The patient reports that her right low back sharp  pain is 6/10 when walking into the clinic this afternoon. .  Notes that the pain has been as high as 8/10 recently after working. Notes taking Hydrocodone prn, but not recently. Has not taken Celebrex recently, either. Has been using a heating pad recently with noted benefit. Notes that she has a stationary bike at home that she's used a few time for 5 minutes. Pain:  6-8/10. Objective: see treatment flow sheet. Assessment:  Tolerated exercise better, with improved endurance noted. Continue with hip/core strengthening. Surgery is scheduled for 5/19/22. Goals:  (to be met in 8 visits)   Pt will report walking tolerance of at least 15-20 minutes. Pt will report standing tolerance with ADLs of at least 15-20 minutes. Pt will report improved ability to lift/carry things. Pt will be independent with an upgraded HEP. Plan: TL ROM, hip ROM, LE/core strengthening, education. .. Date: 4/1/2022  TX#: 2/8 Date: 4/4/22                TX#: 3/8 Date: 4/6/2022            TX#: 4/8 Date:  4/11/22               TX#: 5/8 Date: 4/26/22  Tx#: 6 5/5/22  #7    NuStep (7/8) load 4 for 5 minutes  NuStep load 4 for 5 minutes. NuStep load 4 for 5 minutes. NuStep load 5 for 5 minutes. Load 5 for 5 minutes. Load 5 for 6 minutes. DKTC with heels on SB 2 x 10.  2 x 10.  2x10 AA DKTC with SB 2 x 10. AA DKTC with SB 2 x 10. DKTC with SB 2 x 10. LTR with calves on SB x 10 l/r for 2 sets. LTR x 20 l/r. ... LTR x 20 l/r. ... LTR with calves on SB 2 x 10 l/r. Odalbertina Edilson LTR with calves on SB 2 x 10 l/r. .. LTR x 10 l/r for 2 sets. Bridging with calves on SB 2 x 10.   Bridging with calves on SB x 10 x 2 in a comfortable range. Bridging with calves on SB x 10 x 2 in a comfortable range. 2 x 10. Bridging with calves on SB 2 x 10. Bridging with calves on SB 2 x 10. Supine piriformis stretching by PT with SB assist for 30 seconds each leg. Supine piriformis stretching by PT with SB assist for 45 seconds each leg. Figure 4 piriformis stretch 3x30 sec ea By PT with SB assist for 45 seocnds By PT for 1 minute each leg. Supine piriformis stretching by PT with SB assist for 1 minute each leg. PPT while hook lying with ball squeezing 5 second holds x 10 reps. X 10 reps. . X 10 reps. . X 12 reps. Emiliano Puff PPT while hook lying with ball squeezing x 15 reps. .. X 15 reps. .. Supine clam shells with blue band around knees 2 x 10. With grey band 2 x 10. With grey band 2 x 10. 2 x 10.  3 x 8.  3 x 10. Alt HF while hook lying 2 x 5 each leg. 2 x 5 each leg. 2 x 5 each leg. 2 x 5 left and right. X 10 each leg. X 10 each leg. While left side lying:   stm right TLS area by PT. Grade II lumbar rotation by PT for 2 minutes. R TL OH stretch for 20 seconds x 2. While tall sitting:     B SR with grey band x 10- reps. .. Standing hip abduction no resistance 3x6  Seated trunk flexion swiss ball 2x6, DC after 3 reps on 2nd set  Supine piriformis stretch 3x30 sec ea   See above for piriformis stretch. See above. -    Seated sciatic tensioners x 10 l/r. . X 10 l/r. . X 10 l/r. X 10 l/r. Emiliano Puff X 12 each leg. X 10 l/r. Emiliano Puff TNE was given during today's session. Wooden balance board a/p x 20. Wooden balance board a/p x 20. X 20. X 20. A/P x 20.      4\" LSU x 10 l/r. ... 6\" LSU x 10 l/r. X 10 l/r. Emiliano Puff 6\" LSU x 12 l/r. Emiliano Puff 6\" LSU x 10 l/r. .    X X Ice 10 min unbilled At home prn. .. - -    HEP: LTR hook lying; SKTC, PPT, supine piriformis stretch, supine clams with red band, and bridging. Charges: Ex3.       Total Timed Treatment: 40 min  Total Treatment Time: 40 min

## 2022-05-10 ENCOUNTER — OFFICE VISIT (OUTPATIENT)
Dept: PHYSICAL THERAPY | Facility: HOSPITAL | Age: 72
End: 2022-05-10
Attending: ORTHOPAEDIC SURGERY
Payer: COMMERCIAL

## 2022-05-10 PROCEDURE — 97110 THERAPEUTIC EXERCISES: CPT

## 2022-05-11 ENCOUNTER — TELEPHONE (OUTPATIENT)
Dept: FAMILY MEDICINE CLINIC | Facility: CLINIC | Age: 72
End: 2022-05-11

## 2022-05-12 ENCOUNTER — TELEPHONE (OUTPATIENT)
Dept: FAMILY MEDICINE CLINIC | Facility: CLINIC | Age: 72
End: 2022-05-12

## 2022-05-12 ENCOUNTER — PATIENT MESSAGE (OUTPATIENT)
Dept: FAMILY MEDICINE CLINIC | Facility: CLINIC | Age: 72
End: 2022-05-12

## 2022-05-12 ENCOUNTER — OFFICE VISIT (OUTPATIENT)
Dept: PHYSICAL THERAPY | Facility: HOSPITAL | Age: 72
End: 2022-05-12
Attending: FAMILY MEDICINE
Payer: COMMERCIAL

## 2022-05-12 PROCEDURE — 97110 THERAPEUTIC EXERCISES: CPT

## 2022-05-12 NOTE — TELEPHONE ENCOUNTER
Patient calling for her ultrasound results she needs doctor to review them so she can get clearance for surgery.

## 2022-05-12 NOTE — PROGRESS NOTES
Dx:   Lumbar spinal stenosis with neurogenic claudication. Willie Gonsalez (Authorized # of Visits):  6           Authorizing Physician: Dr. Dre Mae  Next MD visit: ?  Fall Risk: standard         Precautions: n/a           Subjective: The patient reports that her right low back sharp pain is 6/10 when walking into the clinic this afternoon. .  Notes that the pain location and quality remain the same. Notes occasionally travelling of pain to right ankle/foot while walking recently. Notes taking Hydrocodone prn, but not recently. Has not taken Celebrex recently, either. Has been using a heating pad recently with noted benefit. Standing tolerance with ADLs is reported to be 10 minutes. Walking tolerance is 1/2 of a block. Pain:  6/10. Objective: see treatment flow sheet. %O2 saturation was 98 and HR was 79 during today's session. Assessment:  Tolerated exercise better, with improved endurance noted. Continue with hip/core strengthening. Surgery is scheduled for 5/19/22. Goals:  (to be met in 10 visits)   Pt will report walking tolerance of at least 15-20 minutes. Pt will report standing tolerance with ADLs of at least 15-20 minutes. Pt will report improved ability to lift/carry things. Pt will be independent with an upgraded HEP    Plan: Will re-assess at next visit. TL ROM, hip ROM, LE/core strengthening, education. .. Date: 4/1/2022  TX#: 2/8 Date: 4/4/22                TX#: 3/8 Date: 4/6/2022            TX#: 4/8 Date:  4/11/22               TX#: 5/8 Date: 4/26/22  Tx#: 6 5/5/22  #7 5/10/22  #8/10 5/12/22  #9/10    NuStep (7/8) load 4 for 5 minutes  NuStep load 4 for 5 minutes. NuStep load 4 for 5 minutes. NuStep load 5 for 5 minutes. Load 5 for 5 minutes. Load 5 for 6 minutes. NuStep load 6 for 6 minutes. Load 6 for 6 minutes. DKTC with heels on SB 2 x 10.  2 x 10.  2x10 AA DKTC with SB 2 x 10. AA DKTC with SB 2 x 10. DKTC with SB 2 x 10.   DKTC with SB 2 x 10. DKTC with SB 2 x 10. LTR with calves on SB x 10 l/r for 2 sets. LTR x 20 l/r. ... LTR x 20 l/r. ... LTR with calves on SB 2 x 10 l/r. Jacques Reveal LTR with calves on SB 2 x 10 l/r. .. LTR x 10 l/r for 2 sets. LTR with SB x 15 l/r. Jacques Reveal LTR with calves on SB x 15 l/r. .. Bridging with calves on SB 2 x 10. Bridging with calves on SB x 10 x 2 in a comfortable range. Bridging with calves on SB x 10 x 2 in a comfortable range. 2 x 10. Bridging with calves on SB 2 x 10. Bridging with calves on SB 2 x 10.  2 x 10. Bridging with calves on SB 2 x 10. Supine piriformis stretching by PT with SB assist for 30 seconds each leg. Supine piriformis stretching by PT with SB assist for 45 seconds each leg. Figure 4 piriformis stretch 3x30 sec ea By PT with SB assist for 45 seocnds By PT for 1 minute each leg. Supine piriformis stretching by PT with SB assist for 1 minute each leg.  - -    PPT while hook lying with ball squeezing 5 second holds x 10 reps. X 10 reps. . X 10 reps. . X 12 reps. Jacques Reveal PPT while hook lying with ball squeezing x 15 reps. .. X 15 reps. .. X 12 reps. .. X 12 reps. .    Supine clam shells with blue band around knees 2 x 10. With grey band 2 x 10. With grey band 2 x 10. 2 x 10.  3 x 8.  3 x 10. HEP.  HEP. Alt HF while hook lying 2 x 5 each leg. 2 x 5 each leg. 2 x 5 each leg. 2 x 5 left and right. X 10 each leg. X 10 each leg.  - Seated HSS with ankle pumps x 10 each leg. While left side lying:   stm right TLS area by PT. Grade II lumbar rotation by PT for 2 minutes. R TL OH stretch for 20 seconds x 2. While tall sitting:     B SR with grey band x 10- reps. .. Standing hip abduction no resistance 3x6  Seated trunk flexion swiss ball 2x6, DC after 3 reps on 2nd set  Supine piriformis stretch 3x30 sec ea   See above for piriformis stretch. See above. - Standing with red band around ankle:  HA x 10 l/r. Jacques Reveal HE x 10 l/r. .    Standing B SR with red tubing x 10 reps. .  A[employer]x DLS for 1 minute; B arm lifts x 10, marching x 10 l/r. Ana Jessica DL HR on step x 10. Standing with red band around ankle:    ELDER x 10 l/r. Ana Jessica HE x 10 l/r. Aan Jessica 2 x 10. Airex:   DL standing:   B arm lifts x 10. Marching x 10 l/r. Ana Jessica DL HR on step x 12. Seated sciatic tensioners x 10 l/r. . X 10 l/r. . X 10 l/r. X 10 l/r. Ana Jessica X 12 each leg. X 10 l/r. Ana Jessica Seated sciatic tensioners x 10 l/r. Ana Jessica See above. TNE was given during today's session. Wooden balance board a/p x 20. Wooden balance board a/p x 20. X 20. X 20. A/P x 20. A/P x 25. A/P x 25. B calf stretch on Slant for 1 minute. 4\" LSU x 10 l/r. ... 6\" LSU x 10 l/r. X 10 l/r. Ana Jessica 6\" LSU x 12 l/r. Ana Jessica 6\" LSU x 10 l/r. Ana Jessica 6\" LSU x 10 each leg. 6\" LSU x 12 each leg. X X Ice 10 min unbilled At home prn. .. - -      HEP: LTR hook lying; SKTC, PPT, supine piriformis stretch, supine clams with red band, and bridging. Charges: Ex3.       Total Timed Treatment: 40 min  Total Treatment Time: 40 min

## 2022-05-12 NOTE — TELEPHONE ENCOUNTER
Lamonte Gavin from Dr. Lyudmila Guy called office to request pt's clearance for her surgery that is scheduled on 5/19. They will need EKG labs and wanted to know if Dr. Richelle Stahl will be apophylactically treating pt for the Trace that was shown in her U/A recently.  Once pt is cleared, if all information can be sent over to her attention at [phone]

## 2022-05-13 ENCOUNTER — TELEPHONE (OUTPATIENT)
Dept: FAMILY MEDICINE CLINIC | Facility: CLINIC | Age: 72
End: 2022-05-13

## 2022-05-13 ENCOUNTER — NURSE ONLY (OUTPATIENT)
Dept: FAMILY MEDICINE CLINIC | Facility: CLINIC | Age: 72
End: 2022-05-13
Payer: COMMERCIAL

## 2022-05-13 DIAGNOSIS — Z01.818 PREOP EXAMINATION: ICD-10-CM

## 2022-05-13 DIAGNOSIS — R82.90 URINE ABNORMALITY: Primary | ICD-10-CM

## 2022-05-13 LAB
BILIRUB UR QL STRIP.AUTO: NEGATIVE
CLARITY UR REFRACT.AUTO: CLEAR
COLOR UR AUTO: YELLOW
GLUCOSE UR STRIP.AUTO-MCNC: NEGATIVE MG/DL
KETONES UR STRIP.AUTO-MCNC: NEGATIVE MG/DL
LEUKOCYTE ESTERASE UR QL STRIP.AUTO: NEGATIVE
NITRITE UR QL STRIP.AUTO: NEGATIVE
PH UR STRIP.AUTO: 5 [PH] (ref 5–8)
PROT UR STRIP.AUTO-MCNC: NEGATIVE MG/DL
RBC UR QL AUTO: NEGATIVE
SP GR UR STRIP.AUTO: 1.01 (ref 1–1.03)
UROBILINOGEN UR STRIP.AUTO-MCNC: <2 MG/DL

## 2022-05-13 PROCEDURE — 87086 URINE CULTURE/COLONY COUNT: CPT | Performed by: FAMILY MEDICINE

## 2022-05-13 PROCEDURE — 81003 URINALYSIS AUTO W/O SCOPE: CPT | Performed by: FAMILY MEDICINE

## 2022-05-13 NOTE — TELEPHONE ENCOUNTER
From: Saritha Umaña  To: Veronica Sethi DO  Sent: 5/12/2022 5:30 PM CDT  Subject: Recent results from ultrasound test    Hi Dr. Yadav Ing,  Would you let me know if I still need to see Dr. Maggy Knowles tomorrow? I have an appointment scheduled at 2:00. I believe the results came back fine. Also, the surgeon's office was going to call you today as well to see if I have clearance.  My surgery is scheduled for Thursday, May 19

## 2022-05-13 NOTE — TELEPHONE ENCOUNTER
Spoke to pt and let her know message and rec per Abril Campos, DO   Patient voiced understanding.    Nurse visit scheduled 05/13/22 for urine sample collection  Please review and sign pended orders if ok

## 2022-05-13 NOTE — TELEPHONE ENCOUNTER
Pt called office asking to speak to a nurse in regards to an appt she has today. Pt wants to know if she needs to see the specialist today. Pt wants to know if she is cleared for her surgery. Please call pt asap to [phone].

## 2022-05-13 NOTE — TELEPHONE ENCOUNTER
Urine specimen is contaminated- many squamos cells  Urine culture is negative  No tx is indicated    Please have pt perform clean catch UA/Ucx and review how performed. US of liver is stable for surgery. Pt needs to FU after recovers with me to address liver findings.     Please inform- [facility]

## 2022-05-13 NOTE — PROGRESS NOTES
Explained to pt clean catch method to collect urine sample. Patient voiced understanding.    Urine sample collected for UA and culture

## 2022-05-13 NOTE — TELEPHONE ENCOUNTER
Patient has fatty liver disease on ultrasound, a liver cyst and a renal mass versus cyst that will need to be evaluated did after surgery. She could cancel her appointment with GI tomorrow if chooses to but should verify will not be charged. If she is going to be charged for late cancellation then I would recommend she complete the visit for sure. She may need to visit him after surgery anyhow but that is up to her if she wants to complete it now or later. I will need to see her postoperatively once she recovers to address the renal mass versus cyst and then we can discuss liver as well.     Thank you

## 2022-05-16 ENCOUNTER — PATIENT MESSAGE (OUTPATIENT)
Dept: FAMILY MEDICINE CLINIC | Facility: CLINIC | Age: 72
End: 2022-05-16

## 2022-05-16 ENCOUNTER — OFFICE VISIT (OUTPATIENT)
Dept: PHYSICAL THERAPY | Facility: HOSPITAL | Age: 72
End: 2022-05-16
Attending: ORTHOPAEDIC SURGERY
Payer: COMMERCIAL

## 2022-05-16 ENCOUNTER — TELEPHONE (OUTPATIENT)
Dept: FAMILY MEDICINE CLINIC | Facility: CLINIC | Age: 72
End: 2022-05-16

## 2022-05-16 ENCOUNTER — LAB ENCOUNTER (OUTPATIENT)
Dept: LAB | Facility: HOSPITAL | Age: 72
End: 2022-05-16
Attending: ORTHOPAEDIC SURGERY
Payer: COMMERCIAL

## 2022-05-16 DIAGNOSIS — Z20.822 ENCOUNTER FOR PREPROCEDURE SCREENING LABORATORY TESTING FOR COVID-19: ICD-10-CM

## 2022-05-16 DIAGNOSIS — Z01.812 ENCOUNTER FOR PREPROCEDURE SCREENING LABORATORY TESTING FOR COVID-19: ICD-10-CM

## 2022-05-16 LAB — SARS-COV-2 RNA RESP QL NAA+PROBE: NOT DETECTED

## 2022-05-16 PROCEDURE — 97110 THERAPEUTIC EXERCISES: CPT

## 2022-05-16 NOTE — PROGRESS NOTES
Dx:   Lumbar spinal stenosis with neurogenic claudication. Garret Gonsalez (Authorized # of Visits):  6           Authorizing Physician: Dr. Ugalde Carrier  Next MD visit: ?  Fall Risk: standard         Precautions: n/a            Discharge Summary  Pt has attended 10 visits in Physical Therapy. Subjective: The patient reports that her right low back sharp pain is 6/10 when walking into the clinic this morning. Notes that the pain location and quality remain the same. Notes occasionally travelling of pain to right lower leg recently. Notes taking Hydrocodone prn, but not recently. Has not taken Celebrex recently, either. Has been using a heating pad recently with noted benefit. Standing tolerance with ADLs is reported to be 10 minutes. Walking tolerance is 1/2 of a block. Sleeping and LE dressing is fine. Lifting/carrying things and stair negotiation remain difficult, painful. Pain:  6/10. Objective:   Sit to standing without UE assist without pain. Gait: at a decent pace, hips shifted to the right, improved stride lengths noted. TL ROM:  Flexion 80. Extension 5-10 degrees. Hip flexion PROM has improved to 120 degrees B.   B DF ROM is now 10 degrees B. .  6\" ASU with mild HHA prn is stronger right and left. SL standing on the floor:  R x 4 seconds, L x 2 seconds. Palpation:  TTP R lumbar paraspinal mm, R gluteus medius and piriformis mm. ..   %O2 saturation was 97 and HR was 81 during today's session. Assessment/Plan: Tolerating exercise better, with improved endurance noted. Improved TL, hip flexion and DF ROM noted as well as a stronger LE/core. Surgery is scheduled for 5/19/22. Will discharge from PT at this time. FOTO PFS was 44 at discharge. Goals:  (to be met in 10 visits)   Pt will report walking tolerance of at least 15-20 minutes. Not met. Pt will report standing tolerance with ADLs of at least 15-20 minutes. Not met.    Pt will report improved ability to lift/carry things. Not met. Pt will be independent with an upgraded HEP. Met. Date: 4/1/2022  TX#: 2/8 Date: 4/4/22                TX#: 3/8 Date: 4/6/2022            TX#: 4/8 Date:  4/11/22               TX#: 5/8 Date: 4/26/22  Tx#: 6 5/5/22  #7 5/10/22  #8/10 5/12/22  #9/10 5/16/22  #10/10   NuStep (7/8) load 4 for 5 minutes  NuStep load 4 for 5 minutes. NuStep load 4 for 5 minutes. NuStep load 5 for 5 minutes. Load 5 for 5 minutes. Load 5 for 6 minutes. NuStep load 6 for 6 minutes. Load 6 for 6 minutes. NuStep load 6 for 6 minutes. DKTC with heels on SB 2 x 10.  2 x 10.  2x10 AA DKTC with SB 2 x 10. AA DKTC with SB 2 x 10. DKTC with SB 2 x 10. DKTC with SB 2 x 10. DKTC with SB 2 x 10.  2 x 10. LTR with calves on SB x 10 l/r for 2 sets. LTR x 20 l/r. ... LTR x 20 l/r. ... LTR with calves on SB 2 x 10 l/r. Rajeev Breech LTR with calves on SB 2 x 10 l/r. .. LTR x 10 l/r for 2 sets. LTR with SB x 15 l/r. Rajeev Breech LTR with calves on SB x 15 l/r. .. X 15 l/r. .   Bridging with calves on SB 2 x 10. Bridging with calves on SB x 10 x 2 in a comfortable range. Bridging with calves on SB x 10 x 2 in a comfortable range. 2 x 10. Bridging with calves on SB 2 x 10. Bridging with calves on SB 2 x 10.  2 x 10. Bridging with calves on SB 2 x 10. X 10 reps. .   Supine piriformis stretching by PT with SB assist for 30 seconds each leg. Supine piriformis stretching by PT with SB assist for 45 seconds each leg. Figure 4 piriformis stretch 3x30 sec ea By PT with SB assist for 45 seocnds By PT for 1 minute each leg. Supine piriformis stretching by PT with SB assist for 1 minute each leg.  - - By PT for 45 seconds each leg. PPT while hook lying with ball squeezing 5 second holds x 10 reps. X 10 reps. . X 10 reps. . X 12 reps. Rajeev Moon PPT while hook lying with ball squeezing x 15 reps. .. X 15 reps. .. X 12 reps. .. X 12 reps. . X 10 reps. .. Supine clam shells with blue band around knees 2 x 10.  With grey band 2 x 10. With grey band 2 x 10. 2 x 10.  3 x 8.  3 x 10. HEP.  HEP.  HEP. Alt HF while hook lying 2 x 5 each leg. 2 x 5 each leg. 2 x 5 each leg. 2 x 5 left and right. X 10 each leg. X 10 each leg.  - Seated HSS with ankle pumps x 10 each leg. HEP. While left side lying:   stm right TLS area by PT. Grade II lumbar rotation by PT for 2 minutes. R TL OH stretch for 20 seconds x 2. While tall sitting:     B SR with grey band x 10- reps. .. Standing hip abduction no resistance 3x6  Seated trunk flexion swiss ball 2x6, DC after 3 reps on 2nd set  Supine piriformis stretch 3x30 sec ea   See above for piriformis stretch. See above. - Standing with red band around ankle:  HA x 10 l/r. Jacques Reveal HE x 10 l/r. .    Standing B SR with red tubing x 10 reps. .  Airex DLS for 1 minute; B arm lifts x 10, marching x 10 l/r. Jacques Reveal DL HR on step x 10. Standing with red band around ankle:    HA x 10 l/r. Jacques Reveal HE x 10 l/r. Jacques Reveal 2 x 10. Airex:   DL standing:   B arm lifts x 10. Marching x 10 l/r. Jacques Reveal DL HR on step x 12. HEP was discussed and questions were answered. DL HR on step x 10. Seated sciatic tensioners x 10 l/r. . X 10 l/r. . X 10 l/r. X 10 l/r. Jacques Reveal X 12 each leg. X 10 l/r. Jacques Reveal Seated sciatic tensioners x 10 l/r. Jacques Reveal See above. HEP. TNE was given during today's session. Wooden balance board a/p x 20. Wooden balance board a/p x 20. X 20. X 20. A/P x 20. A/P x 25. A/P x 25. B calf stretch on Slant for 1 minute. A/P x 20.     4\" LSU x 10 l/r. ... 6\" LSU x 10 l/r. X 10 l/r. Jacques Reveal 6\" LSU x 12 l/r. Jacques Reveal 6\" LSU x 10 l/r. Jacques Reveal 6\" LSU x 10 each leg. 6\" LSU x 12 each leg. 6\" ASU x 10 reps with HHA prn. .   X X Ice 10 min unbilled At home prn. .. - -      HEP: LTR hook lying; SKTC, PPT, supine piriformis stretch, supine clams with red band, and bridging. Charges: Ex3.       Total Timed Treatment: 40 min  Total Treatment Time: 40 min

## 2022-05-16 NOTE — TELEPHONE ENCOUNTER
Dr Cole Severance office called.  Pt has surgery this Thursday 5/16   Fax [phone]  Ph [phone]  Needs clearance, labs, ekg and chest xray

## 2022-05-16 NOTE — TELEPHONE ENCOUNTER
Pt called asking for clearance to be sent to Dr. Belen Allison office asap. They need it by tomorrow.

## 2022-05-17 ENCOUNTER — LAB ENCOUNTER (OUTPATIENT)
Dept: LAB | Age: 72
End: 2022-05-17
Attending: FAMILY MEDICINE
Payer: COMMERCIAL

## 2022-05-17 DIAGNOSIS — Z22.321 MSSA (METHICILLIN-SUSCEPTIBLE STAPHYLOCOCCUS AUREUS) COLONIZATION: ICD-10-CM

## 2022-05-17 DIAGNOSIS — Z01.818 PREOP EXAMINATION: ICD-10-CM

## 2022-05-17 PROCEDURE — 87081 CULTURE SCREEN ONLY: CPT | Performed by: FAMILY MEDICINE

## 2022-05-17 NOTE — TELEPHONE ENCOUNTER
Attempted to reach surgeon's office. Neva Mckay was unavailable. Held for Doroteo Escoto, but was disconnected. Left message to return phone call.

## 2022-05-17 NOTE — TELEPHONE ENCOUNTER
In addition to H&P, faxed over UA and urine culture results  No xrays or other bloodwork ordered by Nakia Mcclelland, DO   MRSA swab currently in process. Tried calling Raquel Miller back several times but never spoke with office staff.  Got put on hold and call disconnected

## 2022-05-17 NOTE — TELEPHONE ENCOUNTER
Patient notified. She will get the MRSA swab completed today. H&P faxed to Dr Craig Draft office.      Fax [phone]  Ph [phone]

## 2022-05-17 NOTE — TELEPHONE ENCOUNTER
See telephone encounter. H&P faxed to surgeon's office Fax [phone]  Ph [phone]. Patient instructed to complete MRSA swab today at THE [facility] lab. She verbalized understanding.

## 2022-05-17 NOTE — TELEPHONE ENCOUNTER
From: Bobby Lees  To: Edna Crabtree,   Sent: 5/16/2022 8:55 PM CDT  Subject: [facility] surgery for May 19    Dr. Lillie Dumont,  I have called your office a few times and did speak with Jael Scott last Friday. I do know that the ultra sound test came back fine and Jael Scott stated that I could follow-up with the specialist after my surgery - I do plan to schedule an appointment with the gastro. specialist at a later date. I met with the surgeon today and his office staff left a message on Friday and another message today stating that they are in need of your letter for clearance so that I can move forward with the surgery. The surgeon and his staff advised that they cannot move forward with the final steps to schedule the operation until they receive your letter and the letter must be received by tomorrow (Tuesday). I have waited a few months for the surgery which is anticipated for this coming Thursday (May 19) and have completed physical therapy and all lab work. I'm not sure if your staff is forwarding this important information to you. A delay with my surgery would be a major problem related to my insurance coverage and time-off. I called your office at noon today and was advised that a nurse would return my call - however, I did not receive a call back. The surgeon is Dr. Kehinde Deras [phone]     Please advise.   Ramiro Ruffin   [phone]

## 2022-05-17 NOTE — TELEPHONE ENCOUNTER
I did an H&P for surgical clearance with patient has not completed the repeat MRSA/MSSA swab-please have her go today-5/17/2022 to lab. May fax over H&P just waiting for the final negative result on MRSA/MSSA.

## 2022-05-17 NOTE — TELEPHONE ENCOUNTER
Amina is returning nurse call. Amina states she did get H&P but she needs all the lab work and any xrays done for pt. Please follow up tomorrow in regards to clearance.

## 2022-05-18 ENCOUNTER — ANESTHESIA EVENT (OUTPATIENT)
Dept: SURGERY | Facility: HOSPITAL | Age: 72
End: 2022-05-18
Payer: COMMERCIAL

## 2022-05-18 ENCOUNTER — TELEPHONE (OUTPATIENT)
Dept: FAMILY MEDICINE CLINIC | Facility: CLINIC | Age: 72
End: 2022-05-18

## 2022-05-18 NOTE — TELEPHONE ENCOUNTER
--Called Dr. Mustapha Teixeira at cell phone [phone] at 3:34PM -left message to contact on my cell phone.

## 2022-05-18 NOTE — TELEPHONE ENCOUNTER
Pt looking for her MSSA/MRSA results. Pt informed test results are in process, no final result received at this time. Pt voice understanding, requesting a call back today to update if results are received.  Pt has a scheduled surgery for tomorrow 05/1/2022, Surgery to be canceled if results are not available

## 2022-05-18 NOTE — TELEPHONE ENCOUNTER
Spoke with  -patient is cleared for surgery. Does not need repeat testing for MSSA if treated. Apologized for my misunderstanding. H&P addended. Patient notified also cleared for surgery.

## 2022-05-18 NOTE — TELEPHONE ENCOUNTER
called office to request to speak to [facility] in regards to [facility] who is scheduled for lumbar fusion tomorrow morning 630 Am. Pt has not been cleared because her MRSA/MSSA is still in process.  He requested a call to [phone] (direct line)     Sent to provider

## 2022-05-18 NOTE — TELEPHONE ENCOUNTER
[address] calling office today to see if pt is cleared for surgery scheduled for tomorrow. Informed drew is pending the MRSA results that are still in process. [address] will call gain this evening to check for an update on test results. States she needs all the labs completed by pt, Informed labs were ordered by  not .  Amina states if pt completed labs at THE [facility] lab she is unable to view results and requested labs faxed along with UA and US to   Fax [phone]      Labs/UA/culture/US faxed, confirmation received

## 2022-05-19 ENCOUNTER — ANESTHESIA (OUTPATIENT)
Dept: SURGERY | Facility: HOSPITAL | Age: 72
End: 2022-05-19
Payer: COMMERCIAL

## 2022-05-19 ENCOUNTER — HOSPITAL ENCOUNTER (INPATIENT)
Facility: HOSPITAL | Age: 72
LOS: 2 days | Discharge: HOME | End: 2022-05-21
Attending: ORTHOPAEDIC SURGERY | Admitting: ORTHOPAEDIC SURGERY
Payer: COMMERCIAL

## 2022-05-19 ENCOUNTER — APPOINTMENT (OUTPATIENT)
Dept: GENERAL RADIOLOGY | Facility: HOSPITAL | Age: 72
End: 2022-05-19
Attending: ORTHOPAEDIC SURGERY
Payer: COMMERCIAL

## 2022-05-19 ENCOUNTER — APPOINTMENT (OUTPATIENT)
Dept: GENERAL RADIOLOGY | Facility: HOSPITAL | Age: 72
DRG: 457 | End: 2022-05-19
Attending: ORTHOPAEDIC SURGERY
Payer: COMMERCIAL

## 2022-05-19 ENCOUNTER — HOSPITAL ENCOUNTER (INPATIENT)
Facility: HOSPITAL | Age: 72
LOS: 2 days | Discharge: HOME | DRG: 457 | End: 2022-05-21
Attending: ORTHOPAEDIC SURGERY | Admitting: ORTHOPAEDIC SURGERY
Payer: COMMERCIAL

## 2022-05-19 DIAGNOSIS — M41.9 SCOLIOSIS OF LUMBOSACRAL SPINE, UNSPECIFIED SCOLIOSIS TYPE: ICD-10-CM

## 2022-05-19 DIAGNOSIS — Z20.822 ENCOUNTER FOR PREPROCEDURE SCREENING LABORATORY TESTING FOR COVID-19: ICD-10-CM

## 2022-05-19 DIAGNOSIS — M41.9: ICD-10-CM

## 2022-05-19 DIAGNOSIS — M48.061 FORAMINAL STENOSIS OF LUMBAR REGION: Primary | ICD-10-CM

## 2022-05-19 DIAGNOSIS — Z01.812 ENCOUNTER FOR PREPROCEDURE SCREENING LABORATORY TESTING FOR COVID-19: ICD-10-CM

## 2022-05-19 PROBLEM — E66.9 OBESITY (BMI 30-39.9): Status: ACTIVE | Noted: 2021-05-27

## 2022-05-19 PROCEDURE — 76942 ECHO GUIDE FOR BIOPSY: CPT | Performed by: ANESTHESIOLOGY

## 2022-05-19 PROCEDURE — 0SG0071 FUSION OF LUMBAR VERTEBRAL JOINT WITH AUTOLOGOUS TISSUE SUBSTITUTE, POSTERIOR APPROACH, POSTERIOR COLUMN, OPEN APPROACH: ICD-10-PCS | Performed by: ORTHOPAEDIC SURGERY

## 2022-05-19 PROCEDURE — 4A11X4G MONITORING OF PERIPHERAL NERVOUS ELECTRICAL ACTIVITY, INTRAOPERATIVE, EXTERNAL APPROACH: ICD-10-PCS | Performed by: ORTHOPAEDIC SURGERY

## 2022-05-19 PROCEDURE — 76000 FLUOROSCOPY <1 HR PHYS/QHP: CPT | Performed by: ORTHOPAEDIC SURGERY

## 2022-05-19 PROCEDURE — 99254 IP/OBS CNSLTJ NEW/EST MOD 60: CPT | Performed by: INTERNAL MEDICINE

## 2022-05-19 PROCEDURE — 0SG00AJ FUSION OF LUMBAR VERTEBRAL JOINT WITH INTERBODY FUSION DEVICE, POSTERIOR APPROACH, ANTERIOR COLUMN, OPEN APPROACH: ICD-10-PCS | Performed by: ORTHOPAEDIC SURGERY

## 2022-05-19 PROCEDURE — 0SG00A0 FUSION OF LUMBAR VERTEBRAL JOINT WITH INTERBODY FUSION DEVICE, ANTERIOR APPROACH, ANTERIOR COLUMN, OPEN APPROACH: ICD-10-PCS | Performed by: ORTHOPAEDIC SURGERY

## 2022-05-19 PROCEDURE — 0ST20ZZ RESECTION OF LUMBAR VERTEBRAL DISC, OPEN APPROACH: ICD-10-PCS | Performed by: ORTHOPAEDIC SURGERY

## 2022-05-19 DEVICE — CANCELLOUS CHIPS 1-4MM 30CC: Type: IMPLANTABLE DEVICE | Site: BACK | Status: FUNCTIONAL

## 2022-05-19 DEVICE — CANNULATED MODULAR SCREW SHANK 7.5MM X 40MM
Type: IMPLANTABLE DEVICE | Site: BACK | Status: FUNCTIONAL
Brand: INVICTUS

## 2022-05-19 DEVICE — CANNULATED MODULAR SCREW SHANK 7.5MM X 45MM
Type: IMPLANTABLE DEVICE | Site: BACK | Status: FUNCTIONAL
Brand: INVICTUS

## 2022-05-19 DEVICE — MODULAR EXTENDED TAB POLYAXIAL REDUCTION TULIP
Type: IMPLANTABLE DEVICE | Site: BACK | Status: FUNCTIONAL
Brand: INVICTUS

## 2022-05-19 DEVICE — SET SCREW
Type: IMPLANTABLE DEVICE | Site: BACK | Status: FUNCTIONAL
Brand: INVICTUS

## 2022-05-19 RX ORDER — HYDROMORPHONE HYDROCHLORIDE 1 MG/ML
0.6 INJECTION, SOLUTION INTRAMUSCULAR; INTRAVENOUS; SUBCUTANEOUS EVERY 5 MIN PRN
Status: DISCONTINUED | OUTPATIENT
Start: 2022-05-19 | End: 2022-05-19 | Stop reason: HOSPADM

## 2022-05-19 RX ORDER — BISACODYL 10 MG
10 SUPPOSITORY, RECTAL RECTAL
Status: DISCONTINUED | OUTPATIENT
Start: 2022-05-19 | End: 2022-05-21

## 2022-05-19 RX ORDER — HYDROMORPHONE HYDROCHLORIDE 1 MG/ML
0.2 INJECTION, SOLUTION INTRAMUSCULAR; INTRAVENOUS; SUBCUTANEOUS EVERY 5 MIN PRN
Status: DISCONTINUED | OUTPATIENT
Start: 2022-05-19 | End: 2022-05-19 | Stop reason: HOSPADM

## 2022-05-19 RX ORDER — HYDROMORPHONE HYDROCHLORIDE 1 MG/ML
0.4 INJECTION, SOLUTION INTRAMUSCULAR; INTRAVENOUS; SUBCUTANEOUS EVERY 2 HOUR PRN
Status: DISCONTINUED | OUTPATIENT
Start: 2022-05-19 | End: 2022-05-21

## 2022-05-19 RX ORDER — CEFAZOLIN SODIUM/WATER 2 G/20 ML
SYRINGE (ML) INTRAVENOUS
Status: DISPENSED
Start: 2022-05-19 | End: 2022-05-19

## 2022-05-19 RX ORDER — ACETAMINOPHEN 10 MG/ML
INJECTION, SOLUTION INTRAVENOUS
Status: COMPLETED
Start: 2022-05-19 | End: 2022-05-19

## 2022-05-19 RX ORDER — NALOXONE HYDROCHLORIDE 0.4 MG/ML
80 INJECTION, SOLUTION INTRAMUSCULAR; INTRAVENOUS; SUBCUTANEOUS AS NEEDED
Status: DISCONTINUED | OUTPATIENT
Start: 2022-05-19 | End: 2022-05-19 | Stop reason: HOSPADM

## 2022-05-19 RX ORDER — HYDROMORPHONE HYDROCHLORIDE 1 MG/ML
0.2 INJECTION, SOLUTION INTRAMUSCULAR; INTRAVENOUS; SUBCUTANEOUS EVERY 2 HOUR PRN
Status: DISCONTINUED | OUTPATIENT
Start: 2022-05-19 | End: 2022-05-21

## 2022-05-19 RX ORDER — ONDANSETRON 2 MG/ML
4 INJECTION INTRAMUSCULAR; INTRAVENOUS EVERY 4 HOURS PRN
Status: ACTIVE | OUTPATIENT
Start: 2022-05-19 | End: 2022-05-20

## 2022-05-19 RX ORDER — HYDROMORPHONE HYDROCHLORIDE 1 MG/ML
0.8 INJECTION, SOLUTION INTRAMUSCULAR; INTRAVENOUS; SUBCUTANEOUS EVERY 2 HOUR PRN
Status: DISCONTINUED | OUTPATIENT
Start: 2022-05-19 | End: 2022-05-21

## 2022-05-19 RX ORDER — TRANEXAMIC ACID 10 MG/ML
INJECTION, SOLUTION INTRAVENOUS AS NEEDED
Status: DISCONTINUED | OUTPATIENT
Start: 2022-05-19 | End: 2022-05-19 | Stop reason: SURG

## 2022-05-19 RX ORDER — HYDROCODONE BITARTRATE AND ACETAMINOPHEN 10; 325 MG/1; MG/1
TABLET ORAL
COMMUNITY
Start: 2022-05-16

## 2022-05-19 RX ORDER — DIPHENHYDRAMINE HYDROCHLORIDE 50 MG/ML
25 INJECTION INTRAMUSCULAR; INTRAVENOUS EVERY 4 HOURS PRN
Status: DISCONTINUED | OUTPATIENT
Start: 2022-05-19 | End: 2022-05-21

## 2022-05-19 RX ORDER — CEFAZOLIN SODIUM/WATER 2 G/20 ML
2 SYRINGE (ML) INTRAVENOUS ONCE
Status: COMPLETED | OUTPATIENT
Start: 2022-05-19 | End: 2022-05-19

## 2022-05-19 RX ORDER — MIDAZOLAM HYDROCHLORIDE 1 MG/ML
1 INJECTION INTRAMUSCULAR; INTRAVENOUS EVERY 5 MIN PRN
Status: DISCONTINUED | OUTPATIENT
Start: 2022-05-19 | End: 2022-05-19 | Stop reason: HOSPADM

## 2022-05-19 RX ORDER — ACETAMINOPHEN 10 MG/ML
1000 INJECTION, SOLUTION INTRAVENOUS ONCE
Status: COMPLETED | OUTPATIENT
Start: 2022-05-19 | End: 2022-05-19

## 2022-05-19 RX ORDER — ONDANSETRON 2 MG/ML
4 INJECTION INTRAMUSCULAR; INTRAVENOUS EVERY 6 HOURS PRN
Status: DISCONTINUED | OUTPATIENT
Start: 2022-05-19 | End: 2022-05-19 | Stop reason: HOSPADM

## 2022-05-19 RX ORDER — HYDROMORPHONE HYDROCHLORIDE 1 MG/ML
INJECTION, SOLUTION INTRAMUSCULAR; INTRAVENOUS; SUBCUTANEOUS
Status: COMPLETED
Start: 2022-05-19 | End: 2022-05-19

## 2022-05-19 RX ORDER — POLYETHYLENE GLYCOL 3350 17 G/17G
17 POWDER, FOR SOLUTION ORAL DAILY PRN
Status: DISCONTINUED | OUTPATIENT
Start: 2022-05-19 | End: 2022-05-21

## 2022-05-19 RX ORDER — DOCUSATE SODIUM 100 MG/1
100 CAPSULE, LIQUID FILLED ORAL 2 TIMES DAILY
Status: DISCONTINUED | OUTPATIENT
Start: 2022-05-19 | End: 2022-05-21

## 2022-05-19 RX ORDER — SODIUM CHLORIDE 9 MG/ML
INJECTION, SOLUTION INTRAVENOUS CONTINUOUS PRN
Status: DISCONTINUED | OUTPATIENT
Start: 2022-05-19 | End: 2022-05-19 | Stop reason: SURG

## 2022-05-19 RX ORDER — HYDROCODONE BITARTRATE AND ACETAMINOPHEN 10; 325 MG/1; MG/1
2 TABLET ORAL EVERY 4 HOURS PRN
Status: DISCONTINUED | OUTPATIENT
Start: 2022-05-19 | End: 2022-05-21

## 2022-05-19 RX ORDER — SODIUM CHLORIDE, SODIUM LACTATE, POTASSIUM CHLORIDE, CALCIUM CHLORIDE 600; 310; 30; 20 MG/100ML; MG/100ML; MG/100ML; MG/100ML
INJECTION, SOLUTION INTRAVENOUS CONTINUOUS
Status: DISCONTINUED | OUTPATIENT
Start: 2022-05-19 | End: 2022-05-19 | Stop reason: HOSPADM

## 2022-05-19 RX ORDER — SODIUM CHLORIDE, SODIUM LACTATE, POTASSIUM CHLORIDE, CALCIUM CHLORIDE 600; 310; 30; 20 MG/100ML; MG/100ML; MG/100ML; MG/100ML
INJECTION, SOLUTION INTRAVENOUS CONTINUOUS
Status: DISCONTINUED | OUTPATIENT
Start: 2022-05-19 | End: 2022-05-21

## 2022-05-19 RX ORDER — ACETAMINOPHEN 325 MG/1
650 TABLET ORAL EVERY 4 HOURS PRN
Status: DISCONTINUED | OUTPATIENT
Start: 2022-05-19 | End: 2022-05-21

## 2022-05-19 RX ORDER — DIPHENHYDRAMINE HCL 25 MG
25 CAPSULE ORAL EVERY 4 HOURS PRN
Status: DISCONTINUED | OUTPATIENT
Start: 2022-05-19 | End: 2022-05-21

## 2022-05-19 RX ORDER — MEPERIDINE HYDROCHLORIDE 25 MG/ML
25 INJECTION INTRAMUSCULAR; INTRAVENOUS; SUBCUTANEOUS
Status: DISCONTINUED | OUTPATIENT
Start: 2022-05-19 | End: 2022-05-19 | Stop reason: HOSPADM

## 2022-05-19 RX ORDER — ACETAMINOPHEN 500 MG
1000 TABLET ORAL ONCE
Status: DISCONTINUED | OUTPATIENT
Start: 2022-05-19 | End: 2022-05-19 | Stop reason: HOSPADM

## 2022-05-19 RX ORDER — METOCLOPRAMIDE HYDROCHLORIDE 5 MG/ML
10 INJECTION INTRAMUSCULAR; INTRAVENOUS EVERY 8 HOURS PRN
Status: DISCONTINUED | OUTPATIENT
Start: 2022-05-19 | End: 2022-05-19 | Stop reason: HOSPADM

## 2022-05-19 RX ORDER — CEFAZOLIN SODIUM/WATER 2 G/20 ML
2 SYRINGE (ML) INTRAVENOUS EVERY 8 HOURS
Status: COMPLETED | OUTPATIENT
Start: 2022-05-19 | End: 2022-05-20

## 2022-05-19 RX ORDER — OLMESARTAN MEDOXOMIL 40 MG/1
40 TABLET ORAL DAILY
COMMUNITY

## 2022-05-19 RX ORDER — HYDROCODONE BITARTRATE AND ACETAMINOPHEN 10; 325 MG/1; MG/1
1 TABLET ORAL EVERY 4 HOURS PRN
Status: DISCONTINUED | OUTPATIENT
Start: 2022-05-19 | End: 2022-05-21

## 2022-05-19 RX ORDER — SENNOSIDES 8.6 MG
17.2 TABLET ORAL NIGHTLY
Status: DISCONTINUED | OUTPATIENT
Start: 2022-05-19 | End: 2022-05-21

## 2022-05-19 RX ORDER — MIDAZOLAM HYDROCHLORIDE 1 MG/ML
INJECTION INTRAMUSCULAR; INTRAVENOUS AS NEEDED
Status: DISCONTINUED | OUTPATIENT
Start: 2022-05-19 | End: 2022-05-19 | Stop reason: SURG

## 2022-05-19 RX ORDER — GLYCOPYRROLATE 0.2 MG/ML
INJECTION, SOLUTION INTRAMUSCULAR; INTRAVENOUS AS NEEDED
Status: DISCONTINUED | OUTPATIENT
Start: 2022-05-19 | End: 2022-05-19 | Stop reason: SURG

## 2022-05-19 RX ORDER — PROCHLORPERAZINE EDISYLATE 5 MG/ML
10 INJECTION INTRAMUSCULAR; INTRAVENOUS EVERY 6 HOURS PRN
Status: ACTIVE | OUTPATIENT
Start: 2022-05-19 | End: 2022-05-21

## 2022-05-19 RX ORDER — LOSARTAN POTASSIUM 50 MG/1
50 TABLET ORAL DAILY
Status: DISCONTINUED | OUTPATIENT
Start: 2022-05-19 | End: 2022-05-21

## 2022-05-19 RX ORDER — SODIUM PHOSPHATE, DIBASIC AND SODIUM PHOSPHATE, MONOBASIC 7; 19 G/133ML; G/133ML
1 ENEMA RECTAL ONCE AS NEEDED
Status: DISCONTINUED | OUTPATIENT
Start: 2022-05-19 | End: 2022-05-21

## 2022-05-19 RX ORDER — CYCLOBENZAPRINE HCL 10 MG
1 TABLET ORAL 3 TIMES DAILY PRN
COMMUNITY
Start: 2022-05-16 | End: 2022-05-26

## 2022-05-19 RX ORDER — HYDROMORPHONE HYDROCHLORIDE 1 MG/ML
0.4 INJECTION, SOLUTION INTRAMUSCULAR; INTRAVENOUS; SUBCUTANEOUS EVERY 5 MIN PRN
Status: DISCONTINUED | OUTPATIENT
Start: 2022-05-19 | End: 2022-05-19 | Stop reason: HOSPADM

## 2022-05-19 RX ADMIN — GLYCOPYRROLATE 0.4 MG: 0.2 INJECTION, SOLUTION INTRAMUSCULAR; INTRAVENOUS at 08:15:00

## 2022-05-19 RX ADMIN — SODIUM CHLORIDE: 9 INJECTION, SOLUTION INTRAVENOUS at 13:10:00

## 2022-05-19 RX ADMIN — GLYCOPYRROLATE 0.4 MG: 0.2 INJECTION, SOLUTION INTRAMUSCULAR; INTRAVENOUS at 08:20:00

## 2022-05-19 RX ADMIN — SODIUM CHLORIDE: 9 INJECTION, SOLUTION INTRAVENOUS at 07:45:00

## 2022-05-19 RX ADMIN — SODIUM CHLORIDE, SODIUM LACTATE, POTASSIUM CHLORIDE, CALCIUM CHLORIDE: 600; 310; 30; 20 INJECTION, SOLUTION INTRAVENOUS at 13:10:00

## 2022-05-19 RX ADMIN — CEFAZOLIN SODIUM/WATER 2 G: 2 G/20 ML SYRINGE (ML) INTRAVENOUS at 11:20:00

## 2022-05-19 RX ADMIN — TRANEXAMIC ACID 1000 MG: 10 INJECTION, SOLUTION INTRAVENOUS at 10:15:00

## 2022-05-19 RX ADMIN — MIDAZOLAM HYDROCHLORIDE 4 MG: 1 INJECTION INTRAMUSCULAR; INTRAVENOUS at 07:28:00

## 2022-05-19 RX ADMIN — SODIUM CHLORIDE: 9 INJECTION, SOLUTION INTRAVENOUS at 10:50:00

## 2022-05-19 RX ADMIN — CEFAZOLIN SODIUM/WATER 2 G: 2 G/20 ML SYRINGE (ML) INTRAVENOUS at 07:29:00

## 2022-05-19 RX ADMIN — SODIUM CHLORIDE, SODIUM LACTATE, POTASSIUM CHLORIDE, CALCIUM CHLORIDE: 600; 310; 30; 20 INJECTION, SOLUTION INTRAVENOUS at 10:00:00

## 2022-05-19 NOTE — INTERVAL H&P NOTE
Pre-op Diagnosis: Scoliosis of lumbosacral spine, unspecified scoliosis type [M41.9]  Foraminal stenosis of lumbar region [M48.061]    The above referenced H&P was reviewed by Robert Cruz MD on 5/19/2022, the patient was examined and no significant changes have occurred in the patient's condition since the H&P was performed. I discussed with the patient and/or legal representative the potential benefits, risks and side effects of this procedure; the likelihood of the patient achieving goals; and potential problems that might occur during recuperation. I discussed reasonable alternatives to the procedure, including risks, benefits and side effects related to the alternatives and risks related to not receiving this procedure. We will proceed with procedure as planned.

## 2022-05-19 NOTE — BRIEF OP NOTE
Pre-Operative Diagnosis: Scoliosis of lumbosacral spine, unspecified scoliosis type [M41.9]  Foraminal stenosis of lumbar region [M48.061]     Post-Operative Diagnosis: Scoliosis of lumbosacral spine, unspecified scoliosis type [M41. 9]Foraminal stenosis of lumbar region [M48.061]      Procedure Performed:   XLIF left sided L3-4, Right TLIF L4-5, posterior instrumented fusion     Surgeon(s) and Role:     * Yuri Martini MD - Primary    Assistant(s):  Surgical Assistant.: Fern Jerome     Surgical Findings: severe foraminal stenosis and coronal deformity      Specimen:      Estimated Blood Loss: Blood Output: 300 mL (5/19/2022  1:01 PM)      Dictation Number:      Keven Ayoub MD  5/19/2022  1:36 PM

## 2022-05-19 NOTE — ANESTHESIA POSTPROCEDURE EVALUATION
[address] [employer] Patient Status:  Inpatient   Age/Gender 66year old female MRN [id number]   [facility] SURGERY Attending Ander Davis MD   Hosp Day # 0 PCP Marisa Torres DO       Anesthesia Post-op Note    LEFT EXTREME LUMBAR INTERBODY FUSION, POSSIBLE RIGHT TRANSFORAMINAL LUMBAR INTERBODY FUSION LUMBAR 3-LUMBAR 4, LUMBAR 4- LUMBAR 5 WITH POSTERIOR INSTRUMENTED FUSION     Procedure Summary     Date: 05/19/22 Room / Location: [facility] MAIN OR 11 / [facility] MAIN OR    Anesthesia Start: 8619 Anesthesia Stop:     Procedures:       LEFT EXTREME LUMBAR INTERBODY FUSION, POSSIBLE RIGHT TRANSFORAMINAL LUMBAR INTERBODY FUSION LUMBAR 3-LUMBAR 4, LUMBAR 4- LUMBAR 5 WITH POSTERIOR INSTRUMENTED FUSION (Left Spine Lumbar)      INTRAOPERATIVE NEURO MONITORING (N/A )      . (N/A Spine Lumbar) Diagnosis:       Scoliosis of lumbosacral spine, unspecified scoliosis type      (Scoliosis of lumbosacral spine, unspecified scoliosis type [M41. 9]Foraminal stenosis of lumbar region Vandana Nixon)    Surgeons: Ander Davis MD Anesthesiologist: Jackson Cervantes MD    Anesthesia Type: general ASA Status: 2          Anesthesia Type: general    Vitals Value Taken Time   /75 05/19/22 1402   Temp 97.8 05/19/22 1402   Pulse 98 05/19/22 1402   Resp 18 05/19/22 1402   SpO2 99 05/19/22 1402       Patient Location: PACU    Anesthesia Type: general    Airway Patency: extubated    Postop Pain Control: inadequate, being treated    Mental Status: mildly sedated but able to meaningfully participate in the post-anesthesia evaluation    Nausea/Vomiting: none    Cardiopulmonary/Hydration status: stable euvolemic    Complications: no apparent anesthesia related complications    Postop vital signs: stable    Dental Exam: Unchanged from Preop    Patient to be discharged from PACU when criteria met.

## 2022-05-19 NOTE — ANESTHESIA PROCEDURE NOTES
Airway  Date/Time: 5/19/2022 7:31 AM  Urgency: elective    Airway not difficult    General Information and Staff    Patient location during procedure: OR  Anesthesiologist: Shannon Richards MD  Performed: anesthesiologist     Indications and Patient Condition  Indications for airway management: anesthesia  Spontaneous Ventilation: absent  Sedation level: deep  Preoxygenated: yes  Patient position: sniffing  Mask difficulty assessment: 1 - vent by mask    Final Airway Details  Final airway type: endotracheal airway      Successful airway: ETT  Cuffed: yes   Successful intubation technique: direct laryngoscopy  Facilitating devices/methods: intubating stylet  Endotracheal tube insertion site: oral  Blade: Isra  Blade size: #3  ETT size (mm): 7.0    Cormack-Lehane Classification: grade IIB - view of arytenoids or posterior of glottis only  Placement verified by: chest auscultation and capnometry   Cuff volume (mL): 6  Measured from: lips  Number of attempts at approach: 1  Ventilation between attempts: none  Number of other approaches attempted: 0

## 2022-05-19 NOTE — ANESTHESIA PROCEDURE NOTES
Peripheral IV  Date/Time: 5/19/2022 7:40 AM  Inserted by:  Shaggy Richards MD    Placement  Needle size: 18 G  Laterality: right  Location: hand  Local anesthetic: none  Site prep: alcohol  Technique: ultrasound guided  Attempts: 1

## 2022-05-19 NOTE — ANESTHESIA PROCEDURE NOTES
Arterial Line  Performed by: Rubina Richards MD  Authorized by:  Rubina Richards MD     General Information and Staff    Procedure Start:  5/19/2022 7:31 AM  Procedure End:  5/19/2022 7:35 AM  Anesthesiologist:  Steve Dorado MD  Performed By:  Anesthesiologist  Patient Location:  OR  Indication: continuous blood pressure monitoring and blood sampling needed    Site Identification: real time ultrasound guided, surface landmarks and image stored and retrievable    Preanesthetic Checklist: 2 patient identifiers, IV checked, risks and benefits discussed, monitors and equipment checked, pre-op evaluation, timeout performed, anesthesia consent and sterile technique used    Procedure Details    Catheter Size:  20 G  Catheter Length:  1 and 3/4 inchCatheter Type:  Arrow  Seldinger Technique?: Yes    Laterality:  LeftSite:  Radial artery  Site Prep: chlorhexidine  Line Secured:  Wrist Brace, tape and Tegaderm    Assessment    Events: patient tolerated procedure well with no complications      Medications      Additional Comments

## 2022-05-20 ENCOUNTER — APPOINTMENT (OUTPATIENT)
Dept: GENERAL RADIOLOGY | Facility: HOSPITAL | Age: 72
End: 2022-05-20
Attending: ORTHOPAEDIC SURGERY
Payer: COMMERCIAL

## 2022-05-20 ENCOUNTER — APPOINTMENT (OUTPATIENT)
Dept: GENERAL RADIOLOGY | Facility: HOSPITAL | Age: 72
DRG: 457 | End: 2022-05-20
Attending: ORTHOPAEDIC SURGERY
Payer: COMMERCIAL

## 2022-05-20 LAB
HCT VFR BLD AUTO: 31.8 %
HGB BLD-MCNC: 10.5 G/DL

## 2022-05-20 PROCEDURE — 72100 X-RAY EXAM L-S SPINE 2/3 VWS: CPT | Performed by: ORTHOPAEDIC SURGERY

## 2022-05-20 PROCEDURE — 99232 SBSQ HOSP IP/OBS MODERATE 35: CPT | Performed by: INTERNAL MEDICINE

## 2022-05-20 NOTE — PROGRESS NOTES
Patient is alert and oriented x 4. On room air. SL. Voiding freely, polk dced this am at 09:00. Incision covered with gauze and tegaderm dressing - clean, dry, intact. Patient rates pain at 7/10 relieved with Norco 10x2, sensation to BLE is intact. Strength to BLE is 5/5. Ice gel applied while in bed. SCDs, Irineo hose on. Spine precautions maintained. Worked with Physical Therapy - plan to discontinue home after lumbar spine xray. Ambulated in the hallway 2 times today. IS and ankle pumps encouraged, call light within reach and encouraged to call for assistance. All safety precautions and alarms in place.

## 2022-05-20 NOTE — PLAN OF CARE
Patient A & O x4. VSS, on RA. C/o severe pain, norco given with good relief. Denies any numbness/tingling. Surgical incisions covered with gauze and tegaderm are clean, dry and intact. Ahmadi is patent and draining yellow urine. Safety measures in place. Instructed to use call light.

## 2022-05-21 VITALS
DIASTOLIC BLOOD PRESSURE: 76 MMHG | HEART RATE: 92 BPM | SYSTOLIC BLOOD PRESSURE: 150 MMHG | TEMPERATURE: 99 F | BODY MASS INDEX: 32.24 KG/M2 | HEIGHT: 66 IN | RESPIRATION RATE: 16 BRPM | OXYGEN SATURATION: 94 % | WEIGHT: 200.63 LBS

## 2022-05-21 PROCEDURE — 99231 SBSQ HOSP IP/OBS SF/LOW 25: CPT | Performed by: INTERNAL MEDICINE

## 2022-05-21 RX ORDER — DEXAMETHASONE SODIUM PHOSPHATE 4 MG/ML
6 VIAL (ML) INJECTION EVERY 6 HOURS
Status: COMPLETED | OUTPATIENT
Start: 2022-05-21 | End: 2022-05-21

## 2022-05-21 NOTE — PLAN OF CARE
Pain to incision site well managed on prn oral medication. VSS, afebrile, on RA. Pre-op pain to lower back & occasional numbness to lt leg improved post-op. Pt voiding without difficulty after polk dc'd this morning. Ambulatory using walker with SBA. Gauze/tegaderm dsg to lower back & lt flank area cdi. Encouraged use of IS and ankle exercises. Plan is dc home tomorrow.

## 2022-05-21 NOTE — PLAN OF CARE
Plan of care and goals discussed with patient at bedside. Eating well. Awaiting P.T session. Voiding freely. Pain issues today, norco dose increased and two doses of IV steroids to be given today as ordered per spine. Will monitor. Patient stating that its her left leg that feels heavy and painful, that surgeon stated to be expected post op. Rn paged Choco Spotted of above to notify of this and that patient not feeling ready to go home today, as she has many stairs at home. Patient constipated as well, laxatives given and constipation prevention discussed in detail. Milk of mag given this late afternoon. Patient stating she will use dulc supp as needed once home. Plan for home tonight after pm dose of steroids as ordered. Pain management discussed. 1645-All discharge instructions discussed with patient and spouse at bedside. Both verbalized understanding. Instructed of back dressing changes and extra dressing supplies sent along with them. To home later tonight. Instructed where to  meds.      Problem: Patient/Family Goals  Goal: Patient/Family Long Term Goal  Description: Patient's Long Term Goal: To be healed and back to normal activity    Interventions:  -Pain management  -PT/OT  -Follow up with PCP/Ortho as recommended   Outcome: Adequate for Discharge  Goal: Patient/Family Short Term Goal  Description: Patients Short Term goal: to be able to eat, drink, go to the bathroom, change positions, sit in chair and work with therapy with pain controlled    Interventions:  -Pain management  -Activity as tolerated  -PT/OT  -Follow up with PCP/Ortho as recommended   Outcome: Adequate for Discharge     Problem: PAIN - ADULT  Goal: Verbalizes/displays adequate comfort level or patient's stated pain goal  Description: INTERVENTIONS:  - Encourage pt to monitor pain and request assistance  - Assess pain using appropriate pain scale  - Administer analgesics based on type and severity of pain and evaluate response  - Implement non-pharmacological measures as appropriate and evaluate response  - Consider cultural and social influences on pain and pain management  - Manage/alleviate anxiety  - Utilize distraction and/or relaxation techniques  - Monitor for opioid side effects  - Notify MD/LIP if interventions unsuccessful or patient reports new pain  - Anticipate increased pain with activity and pre-medicate as appropriate  Outcome: Adequate for Discharge     Problem: SAFETY ADULT - FALL  Goal: Free from fall injury  Description: INTERVENTIONS:  - Assess pt frequently for physical needs  - Identify cognitive and physical deficits and behaviors that affect risk of falls.   - [place] fall precautions as indicated by assessment.  - Educate pt/family on patient safety including physical limitations  - Instruct pt to call for assistance with activity based on assessment  - Modify environment to reduce risk of injury  - Provide assistive devices as appropriate  - Consider OT/PT consult to assist with strengthening/mobility  - Encourage toileting schedule  Outcome: Adequate for Discharge     Problem: DISCHARGE PLANNING  Goal: Discharge to home or other facility with appropriate resources  Description: INTERVENTIONS:  - Identify barriers to discharge w/pt and caregiver  - Include patient/family/discharge partner in discharge planning  - Arrange for needed discharge resources and transportation as appropriate  - Identify discharge learning needs (meds, wound care, etc)  - Arrange for interpreters to assist at discharge as needed  - Consider post-discharge preferences of patient/family/discharge partner  - Complete POLST form as appropriate  - Assess patient's ability to be responsible for managing their own health  - Refer to Case Management Department for coordinating discharge planning if the patient needs post-hospital services based on physician/LIP order or complex needs related to functional status, cognitive ability or social support system  Outcome: Adequate for Discharge

## 2022-05-23 ENCOUNTER — TELEPHONE (OUTPATIENT)
Dept: FAMILY MEDICINE CLINIC | Facility: CLINIC | Age: 72
End: 2022-05-23

## 2022-05-23 NOTE — TELEPHONE ENCOUNTER
----- Message from Rebecca Ling DO sent at 5/18/2022  9:57 PM CDT -----  Results reviewed. Released to [address] April Ramesh,  I have reviewed your test results. We will discuss at your next visit. Mariah Conner once you have recovered from your back surgery in the next 4 to 6 weeks-please schedule follow-up so we can review treatment plan and discussed your liver findings.   Sincerely,  Rebecca Ling DO

## 2022-05-23 NOTE — TELEPHONE ENCOUNTER
Spoke to pt states she is recovering well from surgery so far.  Will schedule follow up apt in about 6 weeks with

## 2022-06-20 NOTE — OPERATIVE REPORT
Operative Report  Patient Name:  Desire Feliciano  Date: 5/19/2022  Preoperative Diagnosis: Lumbar Radiculopathy, Degenerative Disc Disease, Spondylolisthesis  Postoperative Diagnosis: Same  Primary Surgeon: Dorian Caldera MD  Assistant: Sean Santoyo SA  Procedures:   - L3-4 anterior interbody fusion via a lateral transpsoas approach    CPT 57528  - L4-5 TLIF         CPT 67473  - Placement of Interbody spacer     CPT 74646 x2  - Pedicle screw instrumentation     CPT 59061  - Posterior Spinal Fusion L3-4           CPT 24546  - Placement of  allograft      CPT 95622   - Use of Local Bone Autograft                           CPT 45796   - Supervision of Flouroscopy  - Use of neuromonitoring    Anesthesia: General Endotracheal Anesthesia  Estimated Blood Loss: 300 cc  Implants: Atec PSX Expandable cage 7 x 25mm, IdentiTi NanoTec LIF Ti spacer 8 x 18 x 55mm 10 deg. 6.5 x 45mm x 2 (L3), 7.5 x 45mm x 3, 7.5  X 40mm left L5.      80mm rods x 2 (5.5mm)  Bone Graft: DBM fibers 10cc and Allograft 30cc cancellous  Specimen: None  Condition: Stable  Complications: None      Surgical Indications:  Desire Feliciano is an 66year old female who failed conservative treatment for the  issues listed above. The option of surgery was discussed with the patient. Risks, benefits, and alternatives of surgery were discussed with the patient, which include but are not limited to bleeding, infection, DVT/PE, dural tears, neurologic injury, worsening of neurological status, risk of instability, need for subsequent surgery, risks associated with anesthesia, including death, which were all reviewed with the patient and she consented to proceed with surgery. Surgical Procedure:   After careful identification the patient patient was brought to the operating room laid supine. Patient was induced under general anesthesia endotracheal tube was placed. Patient was flipped prone onto the Jay frame. Hips and knees extended.  All bony prominences were carefully padded, the back and flank was sterilely prepped and draped in standard fashion. Skin markings were made on the lateral and AP fluoroscopic image for the lateral incision and the posteriorly for the disk spaces. A 2 inch skin incision was made on the left. Electrocautery was used to incise through the subcutaneous fat. Obliques were dissected using blunt finger dissection. Transversalis fascia was penetrated using a tissue dilator and finger dissection was used to sweep the retroperitoneal fat and feel the psoas and transverse process of the segmental level. Under the aid of free running EMG and radiographic visualization the tissue dilator was placed through the psoas targeted at the posterior 1/3 of the intervertebral disc of L3-4. Ashia Kris was placed and checked on fluoro image. Sequential dilators were placed followed by the final retractor. Combination of intradiscal jewel placement and retractor maneuvering was utilized to obtain a clear corridor for discectomy. Complete discectomy was conducted using a combination of Gan elevators, curettes and pituitary rongeurs. Trial sizers were placed and appropriate interbody fusion device selected. This was filled with graft material and tamped into position under fluroscopically guided final position. Area was inspected for bleeding and slow removal of retractor was used to inspect the area. Lateral incision was closed using #1 vicryl for the deep subcutaneous tissue, 2-0 vicryl for subQ tissue and 3-0 monocril for the skin. Skin was dressed with dermabond, 4x4 and ioband. Attention was redirected toward posterior pedicle screws. AP and lateral flouroscopic images were taken throughout the procedure to aid in instrumentation placement. Perfect APs werer used to иван out the lateral border of the pedicles bilaterally at the proposed treated levels. Skin incision was made 1cm lateral to this marks.  Electocautery was used to dissect through the subcutaneous tissue and the fascia. Blunt finger dissection was used to develop the plane between the multifidus and remaining paraspinal muscles until the facet joint, pars and transverse process was palpated. Pedicle screws were placed using standard fluoroscopically guided pedicle screw technique. Jamshidis' were placed at the lateral border of the pedicle and inserted to a depth of 25-30mm using AP flouro imaging. Arloa Beards were then inserted. This was completed for all the remaining pedicles bilaterally from L3-L4-L5. Lateral flouro image was taken to ensure appropriate wire trajectory. Pedicles screws of appropriate diameter and length (as assessed using pre operative imaging) were then placed at each level and positioned confirmed with AP/Lat flouro images and EMG probing of screw heads. All screws simulated >12mAMPs. A mancilla elevator was inserted over the joint and bovie was used to remove the facet capsule proximal to the screw head at L3-4. The facet joint was burred down and bone graft in the area was maintained to promote posterior facet joint fusion at L3-4 and on the right at L4-5 facet. With the appropriate screws placed, attention was directed to the left sided L4-5 TLIF. A table mounted retractor was secured to the ipsilateral screws and secured to the table for rigidity. A medial blade was used after elevation of the multifidus off the lamina. The inferior facet and superior and medial aspect of the superior articular facet was osteotomized. Bone was removed, milled and saved for later bone grafting. The exiting nerve root was exposed and decompressed. The ligamentum flavum was removed exposing the dura medially and decompressing the traversing nerve root. The thecal sac and nerve roots were then protected with neurosponges, and gently retracted to reveal the annulus of the intervertebral disk. An annulotomy was performed.   With the sequential use of pituitary rongeurs, disc terry, a variety of curettes, and rasps, a thorough discectomy ensued with care not to violate the subchondral endplates of the adjacent vertebral bodies. With the endplates prepared, the disc space was then appropriately sized with sequential trialing under fluoroscopic control. The integrity of the anterior annulus was inspected and then biologic allograft and local bone graft was delivered to the anterior disc space. An appropriate corresponding interbody implant was then selected, packed with bone graft on the back table, and then implanted under fluoroscopic control. The remaining disk space was then backfilled with bone graft. The wound was then copiously irrigated. 2 rods of appropriate length were selected and contoured to shape and reduced to the tulip heads. The caps were then tightened to the screw 's predetermined specification utilizing a torque . The construct was the evaluated fluoroscopically and determined to be appropriate. 100mg of vancomycin powder was placed in the wound. Closure was done in layers with interrupted 0 Vicryl for the fascia, 2-0 Vicryl for the subcutaneous tissue. The subcutaneous layer was injected with 0.375% Marcaine and the skin was closed with a Monocryl suture. Dermabond and a sterile dressing were then applied. The patient was woken from anesthesia and transferred to the PACU in stable condition.                   A surgical assistant was necessary during the case to provide positioning, exposure, hemostasis, safety and retraction and to manage wound suction so that I could operate with two hands      Yuri Martini MD  Division of Spine Surgery  [address] [place] [address]

## 2022-07-01 ENCOUNTER — OFFICE VISIT (OUTPATIENT)
Dept: FAMILY MEDICINE CLINIC | Facility: CLINIC | Age: 72
End: 2022-07-01
Payer: COMMERCIAL

## 2022-07-01 VITALS
OXYGEN SATURATION: 95 % | BODY MASS INDEX: 32 KG/M2 | SYSTOLIC BLOOD PRESSURE: 130 MMHG | WEIGHT: 197 LBS | DIASTOLIC BLOOD PRESSURE: 68 MMHG | HEART RATE: 105 BPM | TEMPERATURE: 98 F

## 2022-07-01 DIAGNOSIS — I10 ESSENTIAL HYPERTENSION, BENIGN: Primary | ICD-10-CM

## 2022-07-01 DIAGNOSIS — K76.0 FATTY LIVER: ICD-10-CM

## 2022-07-01 DIAGNOSIS — R74.8 ELEVATED LIVER ENZYMES: ICD-10-CM

## 2022-07-01 DIAGNOSIS — N28.89 RIGHT KIDNEY MASS: ICD-10-CM

## 2022-07-01 PROBLEM — E66.811 OBESITY (BMI 30.0-34.9): Status: RESOLVED | Noted: 2020-09-21 | Resolved: 2022-07-01

## 2022-07-01 PROBLEM — A49.01 MSSA (METHICILLIN SUSCEPTIBLE STAPHYLOCOCCUS AUREUS): Status: RESOLVED | Noted: 2022-05-02 | Resolved: 2022-07-01

## 2022-07-01 PROBLEM — E66.9 OBESITY (BMI 30.0-34.9): Status: RESOLVED | Noted: 2020-09-21 | Resolved: 2022-07-01

## 2022-07-01 PROBLEM — E87.1 HYPONATREMIA: Status: RESOLVED | Noted: 2022-01-19 | Resolved: 2022-07-01

## 2022-07-01 LAB
ALBUMIN SERPL-MCNC: 3.8 G/DL (ref 3.4–5)
ALBUMIN/GLOB SERPL: 1 {RATIO} (ref 1–2)
ALP LIVER SERPL-CCNC: 118 U/L
ALT SERPL-CCNC: 38 U/L
ANION GAP SERPL CALC-SCNC: 8 MMOL/L (ref 0–18)
AST SERPL-CCNC: 40 U/L (ref 15–37)
BILIRUB SERPL-MCNC: 0.5 MG/DL (ref 0.1–2)
BUN BLD-MCNC: 8 MG/DL (ref 7–18)
CALCIUM BLD-MCNC: 9.7 MG/DL (ref 8.5–10.1)
CHLORIDE SERPL-SCNC: 101 MMOL/L (ref 98–112)
CO2 SERPL-SCNC: 27 MMOL/L (ref 21–32)
CREAT BLD-MCNC: 0.72 MG/DL
FASTING STATUS PATIENT QL REPORTED: NO
GLOBULIN PLAS-MCNC: 3.7 G/DL (ref 2.8–4.4)
GLUCOSE BLD-MCNC: 109 MG/DL (ref 70–99)
OSMOLALITY SERPL CALC.SUM OF ELEC: 281 MOSM/KG (ref 275–295)
POTASSIUM SERPL-SCNC: 4.8 MMOL/L (ref 3.5–5.1)
PROT SERPL-MCNC: 7.5 G/DL (ref 6.4–8.2)
SODIUM SERPL-SCNC: 136 MMOL/L (ref 136–145)

## 2022-07-01 PROCEDURE — 80053 COMPREHEN METABOLIC PANEL: CPT | Performed by: FAMILY MEDICINE

## 2022-07-01 PROCEDURE — 3078F DIAST BP <80 MM HG: CPT | Performed by: FAMILY MEDICINE

## 2022-07-01 PROCEDURE — 3075F SYST BP GE 130 - 139MM HG: CPT | Performed by: FAMILY MEDICINE

## 2022-07-01 PROCEDURE — 36415 COLL VENOUS BLD VENIPUNCTURE: CPT | Performed by: FAMILY MEDICINE

## 2022-07-01 PROCEDURE — 99214 OFFICE O/P EST MOD 30 MIN: CPT | Performed by: FAMILY MEDICINE

## 2022-07-01 RX ORDER — OLMESARTAN MEDOXOMIL 40 MG/1
40 TABLET ORAL DAILY
Qty: 90 TABLET | Refills: 1 | Status: SHIPPED | OUTPATIENT
Start: 2022-07-01

## 2022-07-01 RX ORDER — AMLODIPINE BESYLATE 5 MG/1
7.5 TABLET ORAL DAILY
Qty: 135 TABLET | Refills: 1 | Status: SHIPPED | OUTPATIENT
Start: 2022-07-01

## 2022-07-01 NOTE — PROGRESS NOTES
Patient came in for draw of ordered labs. Patient drawn out of L AC, x 1 attempt and tolerated well.  1 gold tube drawn.

## 2022-07-16 RX ORDER — GABAPENTIN 300 MG/1
CAPSULE ORAL
COMMUNITY
Start: 2022-05-21

## 2022-09-07 ENCOUNTER — LAB REQUISITION (OUTPATIENT)
Dept: LAB | Facility: HOSPITAL | Age: 72
End: 2022-09-07
Payer: MEDICARE

## 2022-09-07 DIAGNOSIS — M79.642 PAIN IN LEFT HAND: ICD-10-CM

## 2022-09-07 DIAGNOSIS — R22.32 LOCALIZED SWELLING, MASS AND LUMP, LEFT UPPER LIMB: ICD-10-CM

## 2022-09-07 PROCEDURE — 88304 TISSUE EXAM BY PATHOLOGIST: CPT | Performed by: ORTHOPAEDIC SURGERY

## 2022-12-25 DIAGNOSIS — I10 ESSENTIAL HYPERTENSION, BENIGN: ICD-10-CM

## 2022-12-26 DIAGNOSIS — I10 ESSENTIAL HYPERTENSION, BENIGN: ICD-10-CM

## 2022-12-26 RX ORDER — AMLODIPINE BESYLATE 5 MG/1
TABLET ORAL
Qty: 135 TABLET | Refills: 1 | Status: CANCELLED | OUTPATIENT
Start: 2022-12-26

## 2022-12-27 RX ORDER — AMLODIPINE BESYLATE 5 MG/1
TABLET ORAL
Qty: 135 TABLET | Refills: 1 | OUTPATIENT
Start: 2022-12-27

## 2022-12-27 RX ORDER — OLMESARTAN MEDOXOMIL 40 MG/1
TABLET ORAL
Qty: 90 TABLET | Refills: 1 | Status: SHIPPED | OUTPATIENT
Start: 2022-12-27

## 2022-12-29 RX ORDER — AMLODIPINE BESYLATE 5 MG/1
7.5 TABLET ORAL DAILY
Qty: 135 TABLET | Refills: 1 | Status: SHIPPED | OUTPATIENT
Start: 2022-12-29

## 2022-12-29 NOTE — TELEPHONE ENCOUNTER
Pt called office stating that she called the pharmacy for her medication and was told it was denied but she was already told it was approved. Pt needs clarification.

## 2023-01-05 ENCOUNTER — OFFICE VISIT (OUTPATIENT)
Dept: DERMATOLOGY | Age: 73
End: 2023-01-05

## 2023-01-05 DIAGNOSIS — I87.2 VENOUS STASIS DERMATITIS, UNSPECIFIED LATERALITY: Primary | ICD-10-CM

## 2023-01-05 PROCEDURE — 96372 THER/PROPH/DIAG INJ SC/IM: CPT | Performed by: DERMATOLOGY

## 2023-01-05 PROCEDURE — 99204 OFFICE O/P NEW MOD 45 MIN: CPT | Performed by: DERMATOLOGY

## 2023-01-05 RX ORDER — TRIAMCINOLONE ACETONIDE 40 MG/ML
60 INJECTION, SUSPENSION INTRA-ARTICULAR; INTRAMUSCULAR
Status: SHIPPED | OUTPATIENT
Start: 2023-01-05 | End: 2024-12-25

## 2023-01-05 RX ORDER — BETAMETHASONE DIPROPIONATE 0.5 MG/G
CREAM TOPICAL
Qty: 45 G | Refills: 2 | Status: SHIPPED | OUTPATIENT
Start: 2023-01-05 | End: 2023-05-11 | Stop reason: SDUPTHER

## 2023-01-05 RX ADMIN — TRIAMCINOLONE ACETONIDE 60 MG: 40 INJECTION, SUSPENSION INTRA-ARTICULAR; INTRAMUSCULAR at 15:20

## 2023-02-09 ENCOUNTER — OFFICE VISIT (OUTPATIENT)
Dept: INTERNAL MEDICINE CLINIC | Facility: CLINIC | Age: 73
End: 2023-02-09
Payer: MEDICARE

## 2023-02-09 VITALS
HEIGHT: 65 IN | RESPIRATION RATE: 16 BRPM | SYSTOLIC BLOOD PRESSURE: 144 MMHG | BODY MASS INDEX: 34.82 KG/M2 | WEIGHT: 209 LBS | DIASTOLIC BLOOD PRESSURE: 70 MMHG | OXYGEN SATURATION: 98 % | HEART RATE: 110 BPM

## 2023-02-09 DIAGNOSIS — E55.9 VITAMIN D DEFICIENCY: ICD-10-CM

## 2023-02-09 DIAGNOSIS — I10 ESSENTIAL HYPERTENSION: ICD-10-CM

## 2023-02-09 DIAGNOSIS — M54.50 CHRONIC MIDLINE LOW BACK PAIN WITHOUT SCIATICA: ICD-10-CM

## 2023-02-09 DIAGNOSIS — E66.9 OBESITY (BMI 30.0-34.9): ICD-10-CM

## 2023-02-09 DIAGNOSIS — I10 ESSENTIAL HYPERTENSION, BENIGN: ICD-10-CM

## 2023-02-09 DIAGNOSIS — Z51.81 ENCOUNTER FOR THERAPEUTIC DRUG MONITORING: Primary | ICD-10-CM

## 2023-02-09 DIAGNOSIS — G89.29 CHRONIC MIDLINE LOW BACK PAIN WITHOUT SCIATICA: ICD-10-CM

## 2023-02-09 DIAGNOSIS — E53.8 VITAMIN B12 DEFICIENCY: ICD-10-CM

## 2023-02-09 PROCEDURE — 99213 OFFICE O/P EST LOW 20 MIN: CPT | Performed by: NURSE PRACTITIONER

## 2023-02-09 RX ORDER — BETAMETHASONE DIPROPIONATE 0.5 MG/G
1 CREAM TOPICAL 2 TIMES DAILY
COMMUNITY
Start: 2023-01-16

## 2023-02-09 NOTE — PATIENT INSTRUCTIONS
Next steps:  1. Fill your prescribed medication and take as discussed and prescribed:   Take ozempic 0.25mg weekly   2. Schedule a personal nutrition consultation with one of our registered dieticians     Please try to work on the following dietary changes:  Daily protein recommendation to start:  grams  Daily carbohydrate: <100g  Daily calories: 1,200  1. Drink water with meals and throughout the day, cut down on soda and/or juice if consumed. Consider flavored water options like Bubbly, Spindrift, Hint and Maryann. 2.  Eat breakfast daily and focus on having protein with each meal, examples include: greek yogurt, cottage cheese, hard boiled egg, whole grain toast with peanut butter. 3.  Reduce refined carbohydrates and sugars which includes items such as sweets, as well as rice, pasta, and bread and make sure to choose whole grain options when having them with just 1 serving per meal about the size of your inner palm. 4.  Consume non starchy veggies daily working towards making them a good 50% of your daily food intake. Add them to lunch and dinner consistently. 5.  Start a daily probiotic: VSL#3 is recommended, (order on line at www.vsl3. com). Take 1 capsule daily with water for 30 days, then reduce to 1 every other day (this will reduce the cost). Capsules can be left out for 2 weeks, but then must be refrigerated. Please download saskia My Fitness Ethsylvaine Jagjit! Or Net Diary to monitor daily dietary intake and you will be able to see if you are eating the right amount of calories or too much or too little which would hinder weight loss. Additionally this will help to see your daily carbohydrate and protein intake. When you set the saskia up choose 1-2 lbs/week as a goal.  Keeping a paper food journal is an option as well to remain accountable for your choices- this is the start to mindful eating! A low calorie diet has been consistently shown to support weight loss.      Continue or start exercising to help establish a routine. If not already exercising begin with 1 day and progress as able with long-term goal of 30 minutes 5 days a week at a minimum. Meditation daily can help manage and control stress. Chronic stress can make weight loss difficult. Exercising is one way to help with stress, but meditation using the CALM Clare or another comparable alternative can be done in your home or place of work with little time commitment. This Clare can also help work on behavior change and improve sleep. Check out the segment under Calm Masterclass and listen to The 4 Pillars of Health. A great way to begin learning about the foundation of lifestyle with practical tips to use in your every day. Check out www.yourweightmatters. org blog for continued daily support and education along this weight loss journey! Patient Resources:     Personal Training/Fitness Classes/Health Coaching     Mika Palmer and Arriaga Sophiaside @ [email or web address] Full fitness center with group fitness and personal training. Discount available as client of [facility]. Health Coaching and Personal Training with Yenny Whiting at our [facility]- individual weekly coaching with option to add personal training and small group fitness classes targeted at weight loss- [phone] and/or email @ [email or web address]  360FIT [place] [email or web address] Group Fitness [phone] and/or email Kurt Coleman at [email or web address]  [address] [facility] with multiple locations: Aetna (www.OmegaGenesis), Eat The Frog Fitness (www.Prudent Energy. Divide), Fit Body Bootcamp (www.Silverlink Communicationsbodybootcamp.Divide), Inversiones.com Fitness (www.Bringrr. Divide), The Exercise  (www.exercisecoach.Divide)     Online Fitness  Fitness  on Whole Foods in 10 DVD series- www. kgaeb09NXV. Divide  Sit and Be Fit - Chair exercise series Www.sitandbefit. org  Hip Hop Fit with Dominic Martínez at www.hiphopfit. net     Apps for on the NanoVelos 7 Minute Workout (orange box with white 7) - free on the go HIIT training clare  Peloton Clare @ wwwUrban Ladder     Nutrition Trackers and Tools  LoseIT! And My Fitness Pal apps and on line for tracking nutrition  NOOM - virtual health coaching  FitFoundation (healthy meals on the go) in [facility] @ www. myotqvwfwpgqz3p. Viki Sanford MD @ www.Deetectee Microsystemsomd.com and Henrry Whatley (keto and low carb plans recommended) @ www. DLYURE71.YFQ, Metabolic Meals @ www. Weroomals. com - individual prepared meals to go  Drawbridge Inc., Imperative Energy, International Business Machines, Every Plate, EQUISO- on line meal delivery programs for preparation at home  [place] [employer] in [place] for homemade meals to go @ wwwmotify. LatinComics  Diet Doctor @ www. dietdoctor. com - low carb swaps  Yummly - meal prep and planning clare (www.yummly. com)     Stress Management/Behavior/Mindful Eating  CALM meditation clare (www.calm. LatinComics)  Headspace  Am I Hungry? Mindful eating virtual  clare  Www.yourweightmatters. org - Obesity Action Coalition sponsored Blog posts daily  Motivation clare (black box with white \")- daily supportive messages sent to your phone     Books/Video Education/Podcasts  Mindless Eating by Bharti Ellis  Why We Get Sick by Patricia Pino (a book about insulin resistance)  Atomic Habits by Charity Wright (a book about taking small steps to promote greater behavior change)   Can't Hurt Me by Rolly Brizuela (a book exploring the power of discipline in achieving your goals)  The End of Dieting: How to Live for Life by Dr. Emily Washington M.D. or listen to The [address] Episode 61: Understanding \"Nutritarian\" Eating w/Dr. Emily Washington  Your Body in Balance:  The World Fuel Services Corporation of Food, Hormones, and Health by Dr. Aliza Liriano  The Menopause Diet Plan by Kalani Gonzalez and [facility]  The Complete Guide to fasting by Dr. Ro Kirkpatrick, [address] [facility] by Ehsan Leyva Noe Graham, Ph.D, R.D. Weight Loss Surgery Will Not Treat Food Addiction by Brittani Redding Ph.D  The [address] [place] [address] on plant based nutrition  Fed Up - documentary about obesity (Free on [place])  The Truth About Sugar - documentary on sugar (Free on LEID Products, https://youtu. be/6D4itxtCQ1h)  The Dr. Andre Garcia by Dr. Chuck Le MD  Fitlosophy Fitspiration - journal to better health (found at [employer] in fitness aisle)  What Happened to You?- a look at the impact trauma has on behavior written by Ann Woods and Dr. Ophelia Zuniga Again by Lalitha Hutchison - discovering your true self after trauma  Janee Minus talk on PernixData, The Call to YouData  Podcasts: The Exam Room by the Physician's Committee, Nutrition Facts by Dr. Ranulfo Leon    We are here to support you with weight loss, but please remember that you still need your primary care provider for your routine health maintenance.

## 2023-02-14 ENCOUNTER — OFFICE VISIT (OUTPATIENT)
Dept: FAMILY MEDICINE CLINIC | Facility: CLINIC | Age: 73
End: 2023-02-14
Payer: MEDICARE

## 2023-02-14 VITALS
SYSTOLIC BLOOD PRESSURE: 136 MMHG | DIASTOLIC BLOOD PRESSURE: 80 MMHG | HEART RATE: 68 BPM | RESPIRATION RATE: 22 BRPM | HEIGHT: 65 IN | WEIGHT: 211.19 LBS | BODY MASS INDEX: 35.18 KG/M2 | OXYGEN SATURATION: 98 % | TEMPERATURE: 97 F

## 2023-02-14 DIAGNOSIS — N28.89 RIGHT KIDNEY MASS: ICD-10-CM

## 2023-02-14 DIAGNOSIS — I10 ESSENTIAL HYPERTENSION, BENIGN: Primary | ICD-10-CM

## 2023-02-14 DIAGNOSIS — Z12.11 SCREEN FOR COLON CANCER: ICD-10-CM

## 2023-02-14 DIAGNOSIS — Z23 NEED FOR VACCINATION: ICD-10-CM

## 2023-02-14 DIAGNOSIS — K76.0 FATTY LIVER: ICD-10-CM

## 2023-02-14 PROCEDURE — 99214 OFFICE O/P EST MOD 30 MIN: CPT | Performed by: FAMILY MEDICINE

## 2023-02-14 RX ORDER — OLMESARTAN MEDOXOMIL AND HYDROCHLOROTHIAZIDE 20/12.5 20; 12.5 MG/1; MG/1
1 TABLET ORAL DAILY
Qty: 90 TABLET | Refills: 0 | Status: SHIPPED | OUTPATIENT
Start: 2023-02-14 | End: 2024-02-09

## 2023-02-14 RX ORDER — AMLODIPINE BESYLATE 5 MG/1
7.5 TABLET ORAL DAILY
Qty: 135 TABLET | Refills: 1 | Status: SHIPPED | OUTPATIENT
Start: 2023-02-14

## 2023-03-29 ENCOUNTER — HOSPITAL ENCOUNTER (OUTPATIENT)
Dept: MRI IMAGING | Age: 73
Discharge: HOME | End: 2023-03-29
Attending: FAMILY MEDICINE
Payer: MEDICARE

## 2023-03-29 DIAGNOSIS — N28.89 RIGHT KIDNEY MASS: ICD-10-CM

## 2023-03-29 PROCEDURE — A9575 INJ GADOTERATE MEGLUMI 0.1ML: HCPCS | Performed by: FAMILY MEDICINE

## 2023-03-29 PROCEDURE — 74183 MRI ABD W/O CNTR FLWD CNTR: CPT | Performed by: FAMILY MEDICINE

## 2023-03-29 RX ORDER — GADOTERATE MEGLUMINE 376.9 MG/ML
20 INJECTION INTRAVENOUS
Status: COMPLETED | OUTPATIENT
Start: 2023-03-29 | End: 2023-03-29

## 2023-03-29 RX ADMIN — GADOTERATE MEGLUMINE 20 ML: 376.9 INJECTION INTRAVENOUS at 11:14:00

## 2023-03-30 ENCOUNTER — OFFICE VISIT (OUTPATIENT)
Dept: INTERNAL MEDICINE CLINIC | Facility: CLINIC | Age: 73
End: 2023-03-30
Payer: MEDICARE

## 2023-03-30 VITALS
SYSTOLIC BLOOD PRESSURE: 143 MMHG | OXYGEN SATURATION: 96 % | WEIGHT: 205 LBS | DIASTOLIC BLOOD PRESSURE: 72 MMHG | RESPIRATION RATE: 16 BRPM | HEIGHT: 65 IN | HEART RATE: 109 BPM | BODY MASS INDEX: 34.16 KG/M2

## 2023-03-30 DIAGNOSIS — I10 ESSENTIAL HYPERTENSION, BENIGN: ICD-10-CM

## 2023-03-30 DIAGNOSIS — G89.29 CHRONIC MIDLINE LOW BACK PAIN WITHOUT SCIATICA: ICD-10-CM

## 2023-03-30 DIAGNOSIS — E55.9 VITAMIN D DEFICIENCY: ICD-10-CM

## 2023-03-30 DIAGNOSIS — Z51.81 ENCOUNTER FOR THERAPEUTIC DRUG MONITORING: Primary | ICD-10-CM

## 2023-03-30 DIAGNOSIS — E53.8 VITAMIN B12 DEFICIENCY: ICD-10-CM

## 2023-03-30 DIAGNOSIS — M54.50 CHRONIC MIDLINE LOW BACK PAIN WITHOUT SCIATICA: ICD-10-CM

## 2023-03-30 DIAGNOSIS — E66.9 OBESITY (BMI 30.0-34.9): ICD-10-CM

## 2023-03-30 PROCEDURE — 99214 OFFICE O/P EST MOD 30 MIN: CPT | Performed by: NURSE PRACTITIONER

## 2023-03-30 RX ORDER — SEMAGLUTIDE 1.34 MG/ML
0.25 INJECTION, SOLUTION SUBCUTANEOUS WEEKLY
Qty: 1.5 ML | Refills: 1 | Status: SHIPPED | OUTPATIENT
Start: 2023-03-30

## 2023-03-30 RX ORDER — PEN NEEDLE, DIABETIC 30 GX3/16"
1 NEEDLE, DISPOSABLE MISCELLANEOUS DAILY
Qty: 100 EACH | Refills: 1 | Status: SHIPPED | OUTPATIENT
Start: 2023-03-30

## 2023-03-30 NOTE — PATIENT INSTRUCTIONS
Next steps:  1. Fill your prescribed medication and take as discussed and prescribed: ozempic   2. Schedule a personal nutrition consultation with one of our registered dieticians       1. Drink water with meals and throughout the day, cut down on soda and/or juice if consumed. Consider flavored water options like Bubbly, Spindrift, Hint and Maryann. 2.  Eat breakfast daily and focus on having protein with each meal, examples include: greek yogurt, cottage cheese, hard boiled egg, whole grain toast with peanut butter. 3.  Reduce refined carbohydrates and sugars which includes items such as sweets, as well as rice, pasta, and bread and make sure to choose whole grain options when having them with just 1 serving per meal about the size of your inner palm. 4.  Consume non starchy veggies daily working towards making them a good 50% of your daily food intake. Add them to lunch and dinner consistently. 5.  Start a daily probiotic: VSL#3 is recommended, (order on line at www.vsl3. com). Take 1 capsule daily with water for 30 days, then reduce to 1 every other day (this will reduce the cost). Capsules can be left out for 2 weeks, but then must be refrigerated. Please download saskia My Fitness Juanell Coad! Or Net Diary to monitor daily dietary intake and you will be able to see if you are eating the right amount of calories or too much or too little which would hinder weight loss. Additionally this will help to see your daily carbohydrate and protein intake. When you set the saskia up choose 1-2 lbs/week as a goal.  Keeping a paper food journal is an option as well to remain accountable for your choices- this is the start to mindful eating! A low calorie diet has been consistently shown to support weight loss. Continue or start exercising to help establish a routine. If not already exercising begin with 1 day and progress as able with long-term goal of 30 minutes 5 days a week at a minimum.      Meditation daily can help manage and control stress. Chronic stress can make weight loss difficult. Exercising is one way to help with stress, but meditation using the CALM Clare or another comparable alternative can be done in your home or place of work with little time commitment. This Clare can also help work on behavior change and improve sleep. Check out the segment under Calm Masterclass and listen to The 4 Pillars of Health. A great way to begin learning about the foundation of lifestyle with practical tips to use in your every day. Check out www.yourweightmatters. org blog for continued daily support and education along this weight loss journey! Patient Resources:     Personal Training/Fitness Classes/Health Coaching     Mika 112 and Arriaga Sophiaside @ [email or web address]/ Full fitness center with group fitness and personal training. Discount available as client of [facility]. Health Coaching and Personal Training with Quin Loco at our [facility]- individual weekly coaching with option to add personal training and small group fitness classes targeted at weight loss- [phone] and/or email @ [email or web address]  360FIT [place] [email or web address]/. Group Fitness [phone] and/or email Fadi Chan at [email or web address]  [address] [facility] with multiple locations: Aetna (www.Aria Retirement Solutions), Eat The 8hands Fitness (www.iHeart. ITADSecurity), Fit Body Bootcamp (www.FoundValuebodybootGliknikp.ITADSecurity), Klatcher (www.Wabi Sabi Ecofashionconcept), The Exercise  (www.exercisecoach.ITADSecurity)     Online Fitness  Fitness  on Whole Foods in 10 DVD series- www. bhozp74XAB. ITADSecurity  Sit and Be Fit - Chair exercise series Www.sitandbefit. org  Hip Hop Fit with Dominic Martínez at www.hiphopfit. net     Apps for on the IceBreaker 7 Minute Workout (orange box with white 7) - free on the go HIIT training clare  Kei Clare @ www. Haitaobei     Nutrition Trackers and Tools  LoseIT! And My Fitness Pal apps and on line for tracking nutrition  NOOM - virtual health coaching  FitFoundation (healthy meals on the go) in [employer] @ www. gnytcfrffxppr7u. Trent Archibald MD @ www.Turned On Digitald.com and John Canedlario (keto and low carb plans recommended) @ www. QFZLEL16.LRZ, Metabolic Meals @ www. MyMetabolicMeals. com - individual prepared meals to go  Burstly, Jounce Therapeutics, International Business Machines, Every Plate, CLO Virtual Fashion Inc- on line meal delivery programs for preparation at home  [place] [employer] in [place] for homemade meals to go @ wwwFuturestream Networks  Diet Doctor @ www. dietdoctor. com - low carb swaps  Yummly - meal prep and planning clare (www.yummly. com)     Stress Management/Behavior/Mindful Eating  CALM meditation clare (www.6APT)  Headspace  Am I Hungry? Mindful eating virtual  clare  Www.yourweightmatters. org - Obesity Action Coalition sponsored Blog posts daily  Motivation clare (black box with white \")- daily supportive messages sent to your phone     Books/Video Education/Podcasts  Mindless Eating by David Green  Why We Get Sick by Armond Beckwith (a book about insulin resistance)  Atomic Habits by Velma Chandler (a book about taking small steps to promote greater behavior change)   Can't Hurt Me by Narayan Awan (a book exploring the power of discipline in achieving your goals)  The End of Dieting: How to Live for Life by Dr. Yuli Suarez M.D. or listen to The [address] [facility] Episode 61: Understanding \"Nutritarian\" Eating w/Dr. Yuli Suarez  Your Body in Balance: The World [employer] Services Corporation of Food, Hormones, and Health by Dr. China Patino  The Menopause Diet Plan by David Soto and [facility]  The Complete Guide to fasting by Dr. Radha Carrero, [address] [facility] by Sowmya Mendiola, Ph.D, R.D.   Weight Loss Surgery Will Not Treat Food Addiction by Adis Buckner Ph.D  The Game Changers- Arria NLG Documentary on plant based nutrition  Fed Up - documentary about obesity (Free on Utube)  The Truth About Sugar - documentary on sugar (Free on Utube, https://youtu. be/5J4taaaYW2m)  The Dr. Angelica Murray by Dr. Juan Sandoval MD  Fitlosophy Fitspiration - journal to better health (found at [employer] in fitness aisle)  What Happened to You?- a look at the impact trauma has on behavior written by Prince Arteaga and Dr. Raissa Woods Again by Shaneka Drummond - discovering your true self after trauma  Susen Osgood talk on Lumicity, The Call to Courage  Podcasts: The Exam Room by the Physician's Committee, Nutrition Facts by Dr. Jyoti Conrad    We are here to support you with weight loss, but please remember that you still need your primary care provider for your routine health maintenance.

## 2023-04-04 ENCOUNTER — ORDER TRANSCRIPTION (OUTPATIENT)
Dept: PHYSICAL THERAPY | Facility: HOSPITAL | Age: 73
End: 2023-04-04

## 2023-04-04 DIAGNOSIS — G57.01 PIRIFORMIS SYNDROME OF RIGHT SIDE: Primary | ICD-10-CM

## 2023-04-06 ENCOUNTER — OFFICE VISIT (OUTPATIENT)
Dept: FAMILY MEDICINE CLINIC | Facility: CLINIC | Age: 73
End: 2023-04-06
Payer: MEDICARE

## 2023-04-06 VITALS
RESPIRATION RATE: 20 BRPM | OXYGEN SATURATION: 97 % | BODY MASS INDEX: 33.92 KG/M2 | WEIGHT: 203.63 LBS | SYSTOLIC BLOOD PRESSURE: 126 MMHG | TEMPERATURE: 97 F | HEIGHT: 65 IN | DIASTOLIC BLOOD PRESSURE: 70 MMHG | HEART RATE: 77 BPM

## 2023-04-06 DIAGNOSIS — K76.0 FATTY LIVER: ICD-10-CM

## 2023-04-06 DIAGNOSIS — G89.29 CHRONIC MIDLINE LOW BACK PAIN WITHOUT SCIATICA: ICD-10-CM

## 2023-04-06 DIAGNOSIS — M54.50 CHRONIC MIDLINE LOW BACK PAIN WITHOUT SCIATICA: ICD-10-CM

## 2023-04-06 DIAGNOSIS — Z12.11 SCREEN FOR COLON CANCER: ICD-10-CM

## 2023-04-06 DIAGNOSIS — N28.89 RIGHT KIDNEY MASS: ICD-10-CM

## 2023-04-06 DIAGNOSIS — I10 ESSENTIAL HYPERTENSION, BENIGN: Primary | ICD-10-CM

## 2023-04-06 LAB
ANION GAP SERPL CALC-SCNC: 7 MMOL/L (ref 0–18)
BUN BLD-MCNC: 16 MG/DL (ref 7–18)
CALCIUM BLD-MCNC: 9.3 MG/DL (ref 8.5–10.1)
CHLORIDE SERPL-SCNC: 94 MMOL/L (ref 98–112)
CO2 SERPL-SCNC: 28 MMOL/L (ref 21–32)
CREAT BLD-MCNC: 0.76 MG/DL
FASTING STATUS PATIENT QL REPORTED: YES
GFR SERPLBLD BASED ON 1.73 SQ M-ARVRAT: 83 ML/MIN/1.73M2 (ref 60–?)
GLUCOSE BLD-MCNC: 105 MG/DL (ref 70–99)
OSMOLALITY SERPL CALC.SUM OF ELEC: 270 MOSM/KG (ref 275–295)
POTASSIUM SERPL-SCNC: 3.8 MMOL/L (ref 3.5–5.1)
SODIUM SERPL-SCNC: 129 MMOL/L (ref 136–145)

## 2023-04-06 PROCEDURE — 80048 BASIC METABOLIC PNL TOTAL CA: CPT | Performed by: FAMILY MEDICINE

## 2023-04-06 PROCEDURE — 99214 OFFICE O/P EST MOD 30 MIN: CPT | Performed by: FAMILY MEDICINE

## 2023-04-06 RX ORDER — OLMESARTAN MEDOXOMIL AND HYDROCHLOROTHIAZIDE 40/12.5 40; 12.5 MG/1; MG/1
1 TABLET ORAL DAILY
Qty: 90 TABLET | Refills: 0 | Status: SHIPPED | OUTPATIENT
Start: 2023-04-06 | End: 2024-03-31

## 2023-04-06 RX ORDER — CYCLOBENZAPRINE HCL 10 MG
1 TABLET ORAL 3 TIMES DAILY PRN
COMMUNITY
Start: 2023-04-04 | End: 2023-04-24

## 2023-04-06 RX ORDER — PREDNISONE 20 MG/1
TABLET ORAL
COMMUNITY
Start: 2023-03-31

## 2023-04-12 DIAGNOSIS — I10 ESSENTIAL HYPERTENSION, BENIGN: Primary | ICD-10-CM

## 2023-04-12 DIAGNOSIS — E87.1 HYPONATREMIA: ICD-10-CM

## 2023-04-12 DIAGNOSIS — E87.8 HYPOCHLOREMIA: ICD-10-CM

## 2023-04-12 RX ORDER — AMLODIPINE AND OLMESARTAN MEDOXOMIL 5; 40 MG/1; MG/1
5 TABLET ORAL DAILY
Qty: 30 TABLET | Refills: 1 | Status: SHIPPED | OUTPATIENT
Start: 2023-04-12 | End: 2023-04-13

## 2023-04-13 ENCOUNTER — TELEPHONE (OUTPATIENT)
Dept: FAMILY MEDICINE CLINIC | Facility: CLINIC | Age: 73
End: 2023-04-13

## 2023-04-13 DIAGNOSIS — I10 ESSENTIAL HYPERTENSION, BENIGN: ICD-10-CM

## 2023-04-13 RX ORDER — AMLODIPINE AND OLMESARTAN MEDOXOMIL 5; 40 MG/1; MG/1
1 TABLET ORAL DAILY
Qty: 30 TABLET | Refills: 1 | Status: SHIPPED | OUTPATIENT
Start: 2023-04-13 | End: 2023-04-17

## 2023-04-13 NOTE — TELEPHONE ENCOUNTER
[facility] pharmacy called office asking to speak to a nurse to get clarification on pts medication.

## 2023-04-16 DIAGNOSIS — I10 ESSENTIAL HYPERTENSION, BENIGN: ICD-10-CM

## 2023-04-17 RX ORDER — AMLODIPINE AND OLMESARTAN MEDOXOMIL 5; 40 MG/1; MG/1
1 TABLET ORAL DAILY
Qty: 90 TABLET | Refills: 0 | Status: SHIPPED | OUTPATIENT
Start: 2023-04-17

## 2023-04-26 ENCOUNTER — OFFICE VISIT (OUTPATIENT)
Dept: PHYSICAL THERAPY | Facility: HOSPITAL | Age: 73
End: 2023-04-26
Attending: ORTHOPAEDIC SURGERY
Payer: MEDICARE

## 2023-04-26 DIAGNOSIS — G57.01 PIRIFORMIS SYNDROME OF RIGHT SIDE: ICD-10-CM

## 2023-04-26 PROCEDURE — 97110 THERAPEUTIC EXERCISES: CPT

## 2023-04-26 PROCEDURE — 97162 PT EVAL MOD COMPLEX 30 MIN: CPT

## 2023-04-27 ENCOUNTER — OFFICE VISIT (OUTPATIENT)
Dept: INTERNAL MEDICINE CLINIC | Facility: CLINIC | Age: 73
End: 2023-04-27
Payer: MEDICARE

## 2023-04-27 VITALS
RESPIRATION RATE: 18 BRPM | HEART RATE: 92 BPM | DIASTOLIC BLOOD PRESSURE: 70 MMHG | WEIGHT: 204 LBS | SYSTOLIC BLOOD PRESSURE: 126 MMHG | BODY MASS INDEX: 33.99 KG/M2 | OXYGEN SATURATION: 95 % | HEIGHT: 65 IN

## 2023-04-27 DIAGNOSIS — G89.29 CHRONIC MIDLINE LOW BACK PAIN WITHOUT SCIATICA: ICD-10-CM

## 2023-04-27 DIAGNOSIS — Z51.81 ENCOUNTER FOR THERAPEUTIC DRUG MONITORING: Primary | ICD-10-CM

## 2023-04-27 DIAGNOSIS — E55.9 VITAMIN D DEFICIENCY: ICD-10-CM

## 2023-04-27 DIAGNOSIS — E66.9 OBESITY (BMI 30.0-34.9): ICD-10-CM

## 2023-04-27 DIAGNOSIS — M54.50 CHRONIC MIDLINE LOW BACK PAIN WITHOUT SCIATICA: ICD-10-CM

## 2023-04-27 DIAGNOSIS — E53.8 VITAMIN B12 DEFICIENCY: ICD-10-CM

## 2023-04-27 DIAGNOSIS — I10 ESSENTIAL HYPERTENSION, BENIGN: ICD-10-CM

## 2023-04-27 PROCEDURE — 99213 OFFICE O/P EST LOW 20 MIN: CPT | Performed by: NURSE PRACTITIONER

## 2023-04-27 RX ORDER — SEMAGLUTIDE 1.34 MG/ML
0.5 INJECTION, SOLUTION SUBCUTANEOUS WEEKLY
Qty: 3 ML | Refills: 0 | Status: CANCELLED | OUTPATIENT
Start: 2023-04-27

## 2023-04-27 RX ORDER — BETAMETHASONE DIPROPIONATE 0.5 MG/G
1 CREAM TOPICAL 2 TIMES DAILY
COMMUNITY
Start: 2023-04-10

## 2023-04-27 NOTE — PATIENT INSTRUCTIONS
Next steps:  1. Fill your prescribed medication and take as discussed and prescribed:   Ozempic 0.5 mg (would be about 38 clicks on the 1mg pen) if you needed to reduce this    2. Schedule a personal nutrition consultation with one of our registered dieticians     1. Drink water with meals and throughout the day, cut down on soda and/or juice if consumed. Consider flavored water options like Bubbly, Spindrift, Hint and Maryann. 2.  Eat breakfast daily and focus on having protein with each meal, examples include: greek yogurt, cottage cheese, hard boiled egg, whole grain toast with peanut butter. 3.  Reduce refined carbohydrates and sugars which includes items such as sweets, as well as rice, pasta, and bread and make sure to choose whole grain options when having them with just 1 serving per meal about the size of your inner palm. 4.  Consume non starchy veggies daily working towards making them a good 50% of your daily food intake. Add them to lunch and dinner consistently. 5.  Start a daily probiotic: VSL#3 is recommended, (order on line at www.vsl3. com). Take 1 capsule daily with water for 30 days, then reduce to 1 every other day (this will reduce the cost). Capsules can be left out for 2 weeks, but then must be refrigerated. Please download saskia My Fitness Duke Ring! Or Net Diary to monitor daily dietary intake and you will be able to see if you are eating the right amount of calories or too much or too little which would hinder weight loss. Additionally this will help to see your daily carbohydrate and protein intake. When you set the saskia up choose 1-2 lbs/week as a goal.  Keeping a paper food journal is an option as well to remain accountable for your choices- this is the start to mindful eating! A low calorie diet has been consistently shown to support weight loss. Continue or start exercising to help establish a routine.  If not already exercising begin with 1 day and progress as able with long-term goal of 30 minutes 5 days a week at a minimum. Meditation daily can help manage and control stress. Chronic stress can make weight loss difficult. Exercising is one way to help with stress, but meditation using the CALM Clare or another comparable alternative can be done in your home or place of work with little time commitment. This Clare can also help work on behavior change and improve sleep. Check out the segment under Calm Masterclass and listen to The 4 Pillars of Health. A great way to begin learning about the foundation of lifestyle with practical tips to use in your every day. Check out www.yourweightmatters. org blog for continued daily support and education along this weight loss journey! Patient Resources:     Personal Training/Fitness Classes/Health Coaching     Mika Palmer and Arriaga Sophiaside @ [email or web address] Full fitness center with group fitness and personal training. Discount available as client of [facility]. Health Coaching and Personal Training with Nena Paris at our [facility]- individual weekly coaching with option to add personal training and small group fitness classes targeted at weight loss- [phone] and/or email @ [email or web address]  360FIT Jackson [email or web address] Group Fitness [phone] and/or email Barb Huynh at [email or web address]. WebThriftStore  [address] [facility] with multiple locations: Aetna (www.Pacgen Biopharmaceuticals), Eat The IndustryTrader.com Fitness (www.Fusepoint Managed Services. WebThriftStore), Fit Body Bootcamp (www.Abcambodybootcamp.WebThriftStore), Harri (www.PrivacyStar. WebThriftStore), The Exercise  (www.exercisecoach.WebThriftStore)     Online Fitness  Fitness  on [employer] in 10 DVD series- www. hqbgg57LAW. WebThriftStore  Sit and Be Fit - Chair exercise series Www.sitandbefit. org  Hip Hop Fit with Dominic Martínez at www.hiphopfit. net     Apps for on the [employer] 7 Minute Workout (orange box with white 7) - free on the go HIIT training clare  Peloton Clare @ www. Color Eight     Nutrition Trackers and Tools  LoseIT! And My Fitness Pal apps and on line for tracking nutrition  NOOM - virtual health coaching  FitFoundation (healthy meals on the go) in [facility] @ www. nobbzfwybvtsz9o. Trent Archibald MD @ www.Altitude DigitaltrLocAsiand.com and John Candelario (keto and low carb plans recommended) @ www. [email or web address], Metabolic Meals @ www. MyMetabolicMeals. com - individual prepared meals to go  Everpurse, PI Corporation, [employer]ines, Every Plate, BeDo- on line meal delivery programs for preparation at home  [employer] in [place] for homemade meals to go @ wwwAdvantage Capital Partners  Diet Doctor @ www. dietdoctor. IntelligentMDx - low carb swaps  YuTourMatters - meal prep and planning clare (www.yummly. com)     Stress Management/Behavior/Mindful Eating  CALM meditation clare (www.Monkey Puzzle Media)  Headspace  Am I Hungry? Mindful eating virtual  clare  Www.yourweightmatters. org - Obesity Action Coalition sponsored Blog posts daily  Motivation clare (black box with white \")- daily supportive messages sent to your phone     Books/Video Education/Podcasts  Mindless Eating by David Green  Why We Get Sick by Armond Beckwith (a book about insulin resistance)  Atomic Habits by Velma Chandler (a book about taking small steps to promote greater behavior change)   Can't Hurt Me by Narayan Awan (a book exploring the power of discipline in achieving your goals)  The End of Dieting: How to Live for Life by Dr. Yuli Suarez M.D. or listen to The [address] [facility] Episode 61: Understanding \"Nutritarian\" Eating w/Dr. Yuli Suarez  Your Body in Balance: The World [employer] of Food, Hormones, and Health by Dr. China Patino  The Menopause Diet Plan by David Soto and [facility] AT [facility]  The Complete Guide to fasting by Dr. Radha Carrero, [address] [facility] by Swomya Mendiola, Ph.D, R.D.   Weight Loss Surgery Will Not Treat Food Addiction by Adis Buckner Ph.D  The Game Changers- Salvadorflmarco Documentary on plant based nutrition  Fed Up - documentary about obesity (Free on Utube)  The Truth About Sugar - documentary on sugar (Free on Utube, https://youtu. be/0V4vlyjWW7w)  The Dr. Gaytan Minium by Dr. Antonia Mcdonough MD  Fitlosophy Fitspiration - journal to better health (found at [employer] in fitness aisle)  What Happened to You?- a look at the impact trauma has on behavior written by Kimmie Gordon and Dr. Sarah Rosario Again by Estefani Hope - discovering your true self after trauma  Diane Diaz talk on EarlyShares, The Call to Courage  Podcasts: The Exam Room by the Physician's Committee, Nutrition Facts by Dr. Oli Darnell    We are here to support you with weight loss, but please remember that you still need your primary care provider for your routine health maintenance.

## 2023-05-02 ENCOUNTER — OFFICE VISIT (OUTPATIENT)
Dept: PHYSICAL THERAPY | Facility: HOSPITAL | Age: 73
End: 2023-05-02
Attending: ORTHOPAEDIC SURGERY
Payer: MEDICARE

## 2023-05-02 PROCEDURE — 97110 THERAPEUTIC EXERCISES: CPT

## 2023-05-04 ENCOUNTER — OFFICE VISIT (OUTPATIENT)
Dept: PHYSICAL THERAPY | Facility: HOSPITAL | Age: 73
End: 2023-05-04
Attending: ORTHOPAEDIC SURGERY
Payer: MEDICARE

## 2023-05-04 PROCEDURE — 97110 THERAPEUTIC EXERCISES: CPT

## 2023-05-05 ENCOUNTER — TELEPHONE (OUTPATIENT)
Dept: PHYSICAL THERAPY | Facility: HOSPITAL | Age: 73
End: 2023-05-05

## 2023-05-09 ENCOUNTER — OFFICE VISIT (OUTPATIENT)
Dept: PHYSICAL THERAPY | Facility: HOSPITAL | Age: 73
End: 2023-05-09
Attending: ORTHOPAEDIC SURGERY
Payer: MEDICARE

## 2023-05-09 PROCEDURE — 97110 THERAPEUTIC EXERCISES: CPT

## 2023-05-11 ENCOUNTER — OFFICE VISIT (OUTPATIENT)
Dept: DERMATOLOGY | Age: 73
End: 2023-05-11

## 2023-05-11 ENCOUNTER — APPOINTMENT (OUTPATIENT)
Dept: PHYSICAL THERAPY | Facility: HOSPITAL | Age: 73
End: 2023-05-11
Attending: ORTHOPAEDIC SURGERY
Payer: MEDICARE

## 2023-05-11 DIAGNOSIS — I87.2 VENOUS STASIS DERMATITIS, UNSPECIFIED LATERALITY: Primary | ICD-10-CM

## 2023-05-11 PROCEDURE — 99213 OFFICE O/P EST LOW 20 MIN: CPT | Performed by: DERMATOLOGY

## 2023-05-11 PROCEDURE — 96372 THER/PROPH/DIAG INJ SC/IM: CPT | Performed by: DERMATOLOGY

## 2023-05-11 RX ORDER — BETAMETHASONE DIPROPIONATE 0.5 MG/G
CREAM TOPICAL
Qty: 45 G | Refills: 2 | Status: SHIPPED | OUTPATIENT
Start: 2023-05-11

## 2023-05-11 RX ADMIN — TRIAMCINOLONE ACETONIDE 60 MG: 40 INJECTION, SUSPENSION INTRA-ARTICULAR; INTRAMUSCULAR at 15:58

## 2023-05-12 ENCOUNTER — OFFICE VISIT (OUTPATIENT)
Dept: PHYSICAL THERAPY | Facility: HOSPITAL | Age: 73
End: 2023-05-12
Attending: ORTHOPAEDIC SURGERY
Payer: MEDICARE

## 2023-05-12 PROCEDURE — 97110 THERAPEUTIC EXERCISES: CPT

## 2023-05-16 ENCOUNTER — OFFICE VISIT (OUTPATIENT)
Dept: PHYSICAL THERAPY | Facility: HOSPITAL | Age: 73
End: 2023-05-16
Attending: ORTHOPAEDIC SURGERY
Payer: MEDICARE

## 2023-05-16 PROCEDURE — 97110 THERAPEUTIC EXERCISES: CPT

## 2023-05-25 ENCOUNTER — OFFICE VISIT (OUTPATIENT)
Dept: PHYSICAL THERAPY | Facility: HOSPITAL | Age: 73
End: 2023-05-25
Attending: FAMILY MEDICINE
Payer: MEDICARE

## 2023-05-25 PROCEDURE — 97110 THERAPEUTIC EXERCISES: CPT

## 2023-05-30 ENCOUNTER — HOSPITAL ENCOUNTER (OUTPATIENT)
Age: 73
Discharge: HOME | End: 2023-05-30
Payer: MEDICARE

## 2023-05-30 VITALS
TEMPERATURE: 97 F | BODY MASS INDEX: 32.49 KG/M2 | HEART RATE: 82 BPM | DIASTOLIC BLOOD PRESSURE: 77 MMHG | WEIGHT: 195 LBS | SYSTOLIC BLOOD PRESSURE: 142 MMHG | HEIGHT: 65 IN | OXYGEN SATURATION: 98 % | RESPIRATION RATE: 18 BRPM

## 2023-05-30 DIAGNOSIS — H66.90 ACUTE OTITIS MEDIA, UNSPECIFIED OTITIS MEDIA TYPE: Primary | ICD-10-CM

## 2023-05-30 DIAGNOSIS — J06.9 VIRAL URI: ICD-10-CM

## 2023-05-30 DIAGNOSIS — H61.21 IMPACTED CERUMEN OF RIGHT EAR: ICD-10-CM

## 2023-05-30 PROCEDURE — 99213 OFFICE O/P EST LOW 20 MIN: CPT | Performed by: NURSE PRACTITIONER

## 2023-05-30 PROCEDURE — 69209 REMOVE IMPACTED EAR WAX UNI: CPT | Performed by: NURSE PRACTITIONER

## 2023-05-30 RX ORDER — AMOXICILLIN AND CLAVULANATE POTASSIUM 875; 125 MG/1; MG/1
1 TABLET, FILM COATED ORAL 2 TIMES DAILY
Qty: 14 TABLET | Refills: 0 | Status: SHIPPED | OUTPATIENT
Start: 2023-05-30 | End: 2023-06-06

## 2023-06-01 ENCOUNTER — APPOINTMENT (OUTPATIENT)
Dept: PHYSICAL THERAPY | Facility: HOSPITAL | Age: 73
End: 2023-06-01
Payer: MEDICARE

## 2023-06-05 ENCOUNTER — NURSE ONLY (OUTPATIENT)
Dept: FAMILY MEDICINE CLINIC | Facility: CLINIC | Age: 73
End: 2023-06-05
Payer: MEDICARE

## 2023-06-05 DIAGNOSIS — E87.8 HYPOCHLOREMIA: ICD-10-CM

## 2023-06-05 DIAGNOSIS — I10 ESSENTIAL HYPERTENSION, BENIGN: ICD-10-CM

## 2023-06-05 DIAGNOSIS — E87.1 HYPONATREMIA: ICD-10-CM

## 2023-06-05 LAB
ANION GAP SERPL CALC-SCNC: 6 MMOL/L (ref 0–18)
BUN BLD-MCNC: 11 MG/DL (ref 7–18)
CALCIUM BLD-MCNC: 10.1 MG/DL (ref 8.5–10.1)
CHLORIDE SERPL-SCNC: 98 MMOL/L (ref 98–112)
CO2 SERPL-SCNC: 27 MMOL/L (ref 21–32)
CREAT BLD-MCNC: 0.74 MG/DL
FASTING STATUS PATIENT QL REPORTED: YES
GFR SERPLBLD BASED ON 1.73 SQ M-ARVRAT: 85 ML/MIN/1.73M2 (ref 60–?)
GLUCOSE BLD-MCNC: 102 MG/DL (ref 70–99)
OSMOLALITY SERPL CALC.SUM OF ELEC: 272 MOSM/KG (ref 275–295)
POTASSIUM SERPL-SCNC: 4.8 MMOL/L (ref 3.5–5.1)
SODIUM SERPL-SCNC: 131 MMOL/L (ref 136–145)

## 2023-06-05 PROCEDURE — 80048 BASIC METABOLIC PNL TOTAL CA: CPT | Performed by: FAMILY MEDICINE

## 2023-06-05 PROCEDURE — 36415 COLL VENOUS BLD VENIPUNCTURE: CPT | Performed by: FAMILY MEDICINE

## 2023-06-06 ENCOUNTER — APPOINTMENT (OUTPATIENT)
Dept: PHYSICAL THERAPY | Facility: HOSPITAL | Age: 73
End: 2023-06-06
Attending: FAMILY MEDICINE
Payer: MEDICARE

## 2023-06-06 ENCOUNTER — TELEPHONE (OUTPATIENT)
Dept: PHYSICAL THERAPY | Facility: HOSPITAL | Age: 73
End: 2023-06-06

## 2023-06-08 ENCOUNTER — APPOINTMENT (OUTPATIENT)
Dept: PHYSICAL THERAPY | Facility: HOSPITAL | Age: 73
End: 2023-06-08
Attending: FAMILY MEDICINE
Payer: MEDICARE

## 2023-06-14 ENCOUNTER — OFFICE VISIT (OUTPATIENT)
Dept: PHYSICAL THERAPY | Facility: HOSPITAL | Age: 73
End: 2023-06-14
Attending: FAMILY MEDICINE
Payer: MEDICARE

## 2023-06-14 PROCEDURE — 97110 THERAPEUTIC EXERCISES: CPT

## 2023-06-15 ENCOUNTER — OFFICE VISIT (OUTPATIENT)
Dept: FAMILY MEDICINE CLINIC | Facility: CLINIC | Age: 73
End: 2023-06-15
Payer: MEDICARE

## 2023-06-15 VITALS
OXYGEN SATURATION: 98 % | BODY MASS INDEX: 32.52 KG/M2 | RESPIRATION RATE: 18 BRPM | HEIGHT: 64.41 IN | SYSTOLIC BLOOD PRESSURE: 138 MMHG | DIASTOLIC BLOOD PRESSURE: 72 MMHG | TEMPERATURE: 97 F | WEIGHT: 192.81 LBS | HEART RATE: 80 BPM

## 2023-06-15 DIAGNOSIS — N28.89 RIGHT KIDNEY MASS: ICD-10-CM

## 2023-06-15 DIAGNOSIS — G89.29 CHRONIC MIDLINE LOW BACK PAIN WITHOUT SCIATICA: ICD-10-CM

## 2023-06-15 DIAGNOSIS — R63.4 WEIGHT LOSS: ICD-10-CM

## 2023-06-15 DIAGNOSIS — M54.50 CHRONIC MIDLINE LOW BACK PAIN WITHOUT SCIATICA: ICD-10-CM

## 2023-06-15 DIAGNOSIS — K76.0 FATTY LIVER: ICD-10-CM

## 2023-06-15 DIAGNOSIS — Z78.0 ASYMPTOMATIC MENOPAUSAL STATE: ICD-10-CM

## 2023-06-15 DIAGNOSIS — R74.8 ELEVATED LIVER ENZYMES: ICD-10-CM

## 2023-06-15 DIAGNOSIS — M15.9 PRIMARY OSTEOARTHRITIS INVOLVING MULTIPLE JOINTS: ICD-10-CM

## 2023-06-15 DIAGNOSIS — Z91.81 AT RISK FOR FALLS: ICD-10-CM

## 2023-06-15 DIAGNOSIS — Z00.00 ENCOUNTER FOR ANNUAL HEALTH EXAMINATION: Primary | ICD-10-CM

## 2023-06-15 DIAGNOSIS — Z12.11 SCREEN FOR COLON CANCER: ICD-10-CM

## 2023-06-15 DIAGNOSIS — R73.01 ELEVATED FASTING GLUCOSE: ICD-10-CM

## 2023-06-15 DIAGNOSIS — E87.1 HYPONATREMIA: ICD-10-CM

## 2023-06-15 DIAGNOSIS — I10 ESSENTIAL HYPERTENSION, BENIGN: ICD-10-CM

## 2023-06-15 PROBLEM — E87.8 HYPOCHLOREMIA: Status: RESOLVED | Noted: 2023-04-12 | Resolved: 2023-06-15

## 2023-06-15 RX ORDER — AMLODIPINE AND OLMESARTAN MEDOXOMIL 5; 40 MG/1; MG/1
1 TABLET ORAL DAILY
Qty: 90 TABLET | Refills: 1 | Status: SHIPPED | OUTPATIENT
Start: 2023-06-15

## 2023-07-06 ENCOUNTER — OFFICE VISIT (OUTPATIENT)
Dept: PHYSICAL THERAPY | Facility: HOSPITAL | Age: 73
End: 2023-07-06
Attending: FAMILY MEDICINE
Payer: MEDICARE

## 2023-07-06 PROCEDURE — 97110 THERAPEUTIC EXERCISES: CPT

## 2023-07-06 NOTE — PROGRESS NOTES
Diagnosis:   Associated DX:  Piriformis syndrome of right side (G57.01)    Referring Provider: No ref. provider found  Date of Evaluation:    4/26/23    Precautions:  None Next MD visit:   October 2023 with surgeon. Date of Surgery: May 2022. Insurance Primary/Secondary: MEDICARE / [facility]     # Auth Visits: 10            Subjective: R low back and buttock pain is rated 6/10 when walking into the clinic this afternoon. It is a 0/10 when sitting currently. Notes being able to walk around the store without her cane, and is better able to do some house work and light gardening. Notes not taking her pain medication recently. Pain: 0-6/10    Objective: see treatment flow sheet. %O2 saturation was 97 and HR was 85 bpm during today's session. Trendelenberg deviation noted while ambulating. Strength of hip abduction:  R 2+/5. L 4-/5. Assessment: Tolerated exercises well today. Is reportedly more functional overall and with less pain noted. Weakness noted right > left gluteus medius muscle; but marginally improved. Goals:  (to be met in 10 visits)   Sit to/from standing without UE assist and no pain. Up/down a flight of stairs reciprocally with railing use prn. .   Pt will report improved tolerance to house work. Pt report standing tolerance of at least 30 minutes with her ADLs. Pt will be independent with an upgraded HEP. Plan: Will re-assess at next visit. LE/core strengthening, education, and a functional progression. Date: 5/2/2023  TX#: 2/10 Date:   5/4/23              TX#: 3/10 Date:  5/9/23               TX#: 4/10 Date:   5/12/23              TX#: 5/10 Date: 5/16/23  Tx#: 6/10 5/25/23  #7/10 6/14/23  #8/10 7/6/23  #9/10    NuStep (8/8) load 5 for 6 minutes. NuStep L5 for 6 minutes. NuStep (9/9) load 6 for 5 minutes. Load 5 for 6 minutes. Load 5 for 7 minutes. Load 6 for 6 minutes. Load 5 for 6 minutes. Load 6 for 6 minutes.      DKTC with heels on SB 3 x 10.    3 x 10. AA DKTC with heels on SB 2 x 10.  2 x 10. X 20 reps. . DKTC with SB x 25 reps. . DKTC with heels on SB 2 x 10. X 20. LTR with calves on SB x 20 l/r. . X 20 l/r. Mabel Humphries LTR with calves on SB x 20 l/r. Mabel Humphrise LTR with calves on SB x 20 l/r. . X 20 l/r. Mabel Humphries LTR with SB x 20 l/r. .. LTR with calves on SB x 20 l/r. .. X 20 l/r. Mabel Humphries Short range bridging with calves on SB 2 x 8.  2 x 10. Short range bridging with calves on SB 2 x 10. Short range bridging with calves on SB 2 x 10.  2 x 10.  2 x 10. Short range bridging with calves on SB x 10 reps. . 2 x 8. Supine l/r piriformis stretch by PT with SB use for 45 seconds each leg. X 1 minute each. For 1 minute each leg. For 1 minute each leg. X. For 1 minute each leg. Supine l/r piriformis stretching by PT with SB use for 1 minute each leg. For 1 minute each leg by PT. Hook lying clam shells with green band around knees 3 x 8   3 x 8.   2 x 15. With blue band around knees 2 x 10. X 25 reps with blue band around knees. X 25 reps with black band around knees. X 25 reps each leg X 25 reps. Mabel Humphries PPT while hook lying with ball squeezing 3 second holds 2 x 10.  2 x 10.  2 x 10. PPT while hook lying with ball squeezing 2 x 10. X 15 reps. .. X 15 reps. . X 10 reps. .. X 15 reps. Mabel Humphries BLFOs x 10 l/r. Mabel Humphries X 12 reps each leg. HEP. BLFOs as HEP.  HEP.  - HEP. -    While sitting:    A KE x 10 l/r. .  B SR with red tubing 2 x 8. While sitting:     A KE x 12 each leg. B SR with red tubing 2 x 10. While sitting:    A KE x 10 l/r. .  B SR while sitting with red tubing use x 20. While sitting:    A KE x 10 each leg. B SR using red tubing x 20 reps. .. While right side lying:   L HA 2 X 5. While sitting:    A KE x 12 l/r. Mabel Jacobser While right side lying:   L HA 3 x 5. A KE while sitting x 12 each leg. While right side lying:   L HA 3 x 5. Active KE l/r while sitting x 15 l/r. .. L HA while R side lying 3 x 5. X 15 each leg.          4 x 5 L HA while right side lying. Wide balance board a/p x 20. A/P x 20. A/P x 20. Wide balance board a/p x 20. Wide balance board a/p x 20. Wide balance board a/p x 20.  - Wide balance board a/p x 20.     4\" [facility] x 10 l/r with HHA prn. . 4\" [facility] x 10 each leg with HHA prn. . 4\" [facility] x 12 each leg with HHA. Whittier Anson 4\" [facility] x 12 each leg. 4\" [facility] x 15 each leg with HHA prn. . 6\" [facility] x 8 each leg with HHA prn. .. -   6\" :[facility] x 10 each leg with HHA prn. .    Standing HA 2 x 5 each leg with HHA. Standing shallow sit-backs x 10 with cueing from PT and as HEP. Standing HA 2 x 5 each leg. Airex DL standing:  No HHA for 1 minute. Marching x 10 l/r. [facility] x 10 l/r. .  Standing R HA x 10. L HA x 5 while right side lying. DL standing:   R HA x 10. Airex marching x 10 l/r. Whittier Anson [facility]  Airex x 10 l/r. .. DL standing:    R HA 2 x 8. .   Marching on Airex x 10 l/r. Whittier Anson [facility] Airex x 10 l/r. Whittier Anson DL standing on the floor R HA 2 x 10. Marching on Airex x 12 l/r. ..  [facility] Airex x 10 l/r. . -      -    - R HA while standing in parallel bars with yellow band around ankle 2 x 8. Airex:   DL standing with B arm lifts x 10. Marching x 10 l/r. .. HEP: PPT while hook lying, standing HA, supine clams with red band around knees, and supine piriformis stretch.      Charges: ex3      Total Timed Treatment: 43 min  Total Treatment Time: 43 min

## 2023-07-10 ENCOUNTER — TELEPHONE (OUTPATIENT)
Dept: FAMILY MEDICINE CLINIC | Facility: CLINIC | Age: 73
End: 2023-07-10

## 2023-07-10 ENCOUNTER — OFFICE VISIT (OUTPATIENT)
Dept: PHYSICAL THERAPY | Facility: HOSPITAL | Age: 73
End: 2023-07-10
Attending: FAMILY MEDICINE
Payer: MEDICARE

## 2023-07-10 PROCEDURE — 97110 THERAPEUTIC EXERCISES: CPT

## 2023-07-10 NOTE — TELEPHONE ENCOUNTER
Pt requesting a refill for amlodipine-olmesartan 5-40 mg and was told by pharmacy that they need PCP authorization. [facility] - the pharmacist states that it was too early to fill before and pt needs to call after 6 pm tonight to request the medication be filled wither 30 or 90 day supply. LMOM to return call to the office. Provided pt office phone [phone] along with office hours.

## 2023-07-10 NOTE — PROGRESS NOTES
Diagnosis:   Associated DX:  Piriformis syndrome of right side (G57.01)    Referring Provider: No ref. provider found  Date of Evaluation:    4/26/23    Precautions:  None Next MD visit:   October 2023 with surgeon. Date of Surgery: May 2022. Insurance Primary/Secondary: MEDICARE / [facility] [place]     # Auth Visits: 10           Progress Summary  Pt has attended 10 visits in Physical Therapy. Subjective: R low back and buttock pain is rated 7/10 when walking into the clinic this afternoon. Has been more painful this past week, but is not sure why. It is a 0/10 when sitting currently. Notes being able to walk around the store without her cane, and is better able to do more house work, organizing/cleaning her office, and light gardening. Stairs are being negotiated reciprocally. Report standing tolerance of 30-45 minutes. Dressing and bathing are reportedly normal.    HEP is going well. Notes not taking her pain medication recently. Pain: 0-7/10    Objective:   Sit to standing with some UE assist.    Trendelenberg deviation noted while ambulating, especially during right stance phase, and excessive pronation left foot/LE noted. TL ROM standing:  Flexion 65. Extension 5. Strength of hip abduction:  R 2+/5. L 4-/5. Hip flexion has improved to 4- to 4/5. Hip flexion PROM is now 115 degrees bilaterally. 6\" Anterior step-ups are weaker on R and do require some UE assist.     Assessment:  Is reportedly more functional overall. Weakness noted right > left gluteus medius muscle; but marginally improved. Recommend continuing PT. Goals:  (to be met in 10 visits)   Sit to/from standing without UE assist and no pain. Progress. Up/down a flight of stairs reciprocally with railing use prn. .   Met. Pt will report improved tolerance to house work. Progress. Pt report standing tolerance of at least 30 minutes with her ADLs. Met.    Pt will be independent with an upgraded HEP. Ongoing, being met. Plan: Continue skilled Physical Therapy 1-2 x/week or a total of 10 additional visits over a 90 day period. Treatment will continue to include:  LE/core strengthening, education, and a functional progression as tolerated and as appropriate. Patient/Family/Caregiver was advised of these findings, precautions, and treatment options and has agreed to actively participate in planning and for this course of care. Thank you for your referral. If you have any questions, please contact me at Dept: [phone]. Sincerely,  Electronically signed by therapist: Casper Pederson PT     Physician's certification required:  Yes  Please co-sign this note. I certify the need for these services furnished under this plan of treatment and while under my care. X___________________________________________________ Date____________________    Certification From: 5/78/1090  To:10/8/2023       Date: 5/2/2023  TX#: 2/10 Date:   5/4/23              TX#: 3/10 Date:  5/9/23               TX#: 4/10 Date:   5/12/23              TX#: 5/10 Date: 5/16/23  Tx#: 6/10 5/25/23  #7/10 6/14/23  #8/10 7/6/23  #9/10 7/10/23  #10/10   NuStep (8/8) load 5 for 6 minutes. NuStep L5 for 6 minutes. NuStep (9/9) load 6 for 5 minutes. Load 5 for 6 minutes. Load 5 for 7 minutes. Load 6 for 6 minutes. Load 5 for 6 minutes. Load 6 for 6 minutes. NuStep (9/9) load 6 for 6 minutes. DKTC with heels on SB 3 x 10.    3 x 10. AA DKTC with heels on SB 2 x 10.  2 x 10. X 20 reps. . DKTC with SB x 25 reps. . DKTC with heels on SB 2 x 10. X 20. X 20. LTR with calves on SB x 20 l/r. . X 20 l/r. Tomasa Led LTR with calves on SB x 20 l/r. Tomasa Led LTR with calves on SB x 20 l/r. . X 20 l/r. Tomasa Led LTR with SB x 20 l/r. .. LTR with calves on SB x 20 l/r. .. X 20 l/r. . X 20 l/r. Tomasa Led Short range bridging with calves on SB 2 x 8.  2 x 10. Short range bridging with calves on SB 2 x 10.   Short range bridging with calves on SB 2 x 10.  2 x 10.  2 x 10. Short range bridging with calves on SB x 10 reps. . 2 x 8.   2 x 8. Supine l/r piriformis stretch by PT with SB use for 45 seconds each leg. X 1 minute each. For 1 minute each leg. For 1 minute each leg. X. For 1 minute each leg. Supine l/r piriformis stretching by PT with SB use for 1 minute each leg. For 1 minute each leg by PT. For 1 minute each leg by PT. Hook lying clam shells with green band around knees 3 x 8   3 x 8.   2 x 15. With blue band around knees 2 x 10. X 25 reps with blue band around knees. X 25 reps with black band around knees. X 25 reps each leg X 25 reps. . X 25 reps. .. PPT while hook lying with ball squeezing 3 second holds 2 x 10.  2 x 10.  2 x 10. PPT while hook lying with ball squeezing 2 x 10. X 15 reps. .. X 15 reps. . X 10 reps. .. X 15 reps. . X 15 reps. Odella Edilson BLFOs x 10 l/r. Odella Edilson X 12 reps each leg. HEP. BLFOs as HEP.  HEP.  - HEP.  - HEP. While sitting:    A KE x 10 l/r. .  B SR with red tubing 2 x 8. While sitting:     A KE x 12 each leg. B SR with red tubing 2 x 10. While sitting:    A KE x 10 l/r. .  B SR while sitting with red tubing use x 20. While sitting:    A KE x 10 each leg. B SR using red tubing x 20 reps. .. While right side lying:   L HA 2 X 5. While sitting:    A KE x 12 l/r. Odella Edilson While right side lying:   L HA 3 x 5. A KE while sitting x 12 each leg. While right side lying:   L HA 3 x 5. Active KE l/r while sitting x 15 l/r. .. L HA while R side lying 3 x 5. X 15 each leg. 4 x 5 L HA while right side lying. With ankle pumps l/r. .. HEP. Wide balance board a/p x 20. A/P x 20. A/P x 20. Wide balance board a/p x 20. Wide balance board a/p x 20. Wide balance board a/p x 20.  - Wide balance board a/p x 20.  -   4\" [facility] x 10 l/r with HHA prn. . 4\" [facility] x 10 each leg with HHA prn. . 4\" [facility] x 12 each leg with HHA. Evangelist Hernandeziter 4\" [facility] x 12 each leg. 4\" [facility] x 15 each leg with HHA prn. . 6\" [facility] x 8 each leg with HHA prn. .. - 6\" :[facility] x 10 each leg with HHA prn. . 6\"[facility] x 3 reps each leg with light HHA prn. .   Standing HA 2 x 5 each leg with HHA. Standing shallow sit-backs x 10 with cueing from PT and as HEP. Standing HA 2 x 5 each leg. Airex DL standing:  No HHA for 1 minute. Marching x 10 l/r. [facility] x 10 l/r. .  Standing R HA x 10. L HA x 5 while right side lying. DL standing:   R HA x 10. Airex marching x 10 l/r. Charlott Britney [facility]  Airex x 10 l/r. .. DL standing:    R HA 2 x 8. .   Marching on Airex x 10 l/r. Charlott Britney [facility] Airex x 10 l/r. Isauro Polon DL standing on the floor R HA 2 x 10. Marching on Airex x 12 l/r. ..  [facility] Airex x 10 l/r. . -      -    - R HA while standing in parallel bars with yellow band around ankle 2 x 8. Airex:   DL standing with B arm lifts x 10. Marching x 10 l/r. .. -   HEP: PPT while hook lying, standing HA, supine clams with red band around knees, and supine piriformis stretch.      Charges: ex3      Total Timed Treatment: 43 min  Total Treatment Time: 43 min

## 2023-07-12 ENCOUNTER — OFFICE VISIT (OUTPATIENT)
Dept: PHYSICAL THERAPY | Facility: HOSPITAL | Age: 73
End: 2023-07-12
Attending: FAMILY MEDICINE
Payer: MEDICARE

## 2023-07-12 DIAGNOSIS — Z51.81 ENCOUNTER FOR THERAPEUTIC DRUG MONITORING: ICD-10-CM

## 2023-07-12 DIAGNOSIS — E66.9 OBESITY (BMI 30.0-34.9): Primary | ICD-10-CM

## 2023-07-12 PROCEDURE — 97110 THERAPEUTIC EXERCISES: CPT

## 2023-07-12 NOTE — PROGRESS NOTES
Diagnosis:   Associated DX:  Piriformis syndrome of right side (G57.01)    Referring Provider: No ref. provider found  Date of Evaluation:    4/26/23    Precautions:  None Next MD visit:   October 2023 with surgeon. Date of Surgery: May 2022. Insurance Primary/Secondary: MEDICARE / 3 [facility]     # Auth Visits: 10                Subjective: R low back aching pain is rated 8/10 when walking into the clinic this morning. Has been more painful this past week, but is not sure why; \"weather related? \". It is a 0/10 when sitting currently. Notes being able to walk around the store without her cane, and is better able to do more house work, organizing/cleaning her office, and light gardening. Stairs are being negotiated reciprocally. Report standing tolerance of 30-45 minutes. Dressing and bathing are reportedly normal.    HEP is going well. Notes not taking her pain medication recently. Pain: 0-8/10    Objective:   Sit to standing with some UE assist.    Trendelenberg deviation noted while ambulating, especially during right stance phase, and excessive pronation left foot/LE noted. TL ROM standing:  Flexion 65. Extension 5. Strength of hip abduction:  R 2+/5. L 4-/5. Hip flexion has improved to 4- to 4/5. Hip flexion PROM is now 115 degrees bilaterally. 6\" Anterior step-ups are weaker on R and do require some UE assist.     Assessment:  Is reportedly more functional overall. Weakness noted right > left gluteus medius muscle; but marginally improved. Goals:  (to be met in 10 visits)   Sit to/from standing without UE assist and no pain. Progress. Up/down a flight of stairs reciprocally with railing use prn. .   Met. Pt will report improved tolerance to house work. Progress. Pt report standing tolerance of at least 30 minutes with her ADLs. Met. Pt will be independent with an upgraded HEP. Ongoing, being met.        Plan: Continue skilled Physical Therapy 1-2 x/week or a total of 10 additional visits over a 90 day period. Treatment will continue to include:  LE/core strengthening, education, and a functional progression as tolerated and as appropriate. Date:   5/4/23              TX#: 3/10 Date:  5/9/23               TX#: 4/10 Date:   5/12/23              TX#: 5/10 Date: 5/16/23  Tx#: 6/10 5/25/23  #7/10 6/14/23  #8/10 7/6/23  #9/10 7/10/23  #10/10 7/12/23  #1 add'l... NuStep L5 for 6 minutes. NuStep (9/9) load 6 for 5 minutes. Load 5 for 6 minutes. Load 5 for 7 minutes. Load 6 for 6 minutes. Load 5 for 6 minutes. Load 6 for 6 minutes. NuStep (9/9) load 6 for 6 minutes. Load 6 for 6 minutes    3 x 10. AA DKTC with heels on SB 2 x 10.  2 x 10. X 20 reps. . DKTC with SB x 25 reps. . DKTC with heels on SB 2 x 10. X 20. X 20. DKTC with heels on SB x 25 reps. .   X 20 l/r. Reilly Nicholson LTR with calves on SB x 20 l/r. Reilly Nicholson LTR with calves on SB x 20 l/r. . X 20 l/r. Reilly Nicholson LTR with SB x 20 l/r. .. LTR with calves on SB x 20 l/r. .. X 20 l/r. . X 20 l/r. Reilly Nicholson LTR with calves on SB x 20 l/r. Reilly Nicholson 2 x 10. Short range bridging with calves on SB 2 x 10. Short range bridging with calves on SB 2 x 10.  2 x 10.  2 x 10. Short range bridging with calves on SB x 10 reps. . 2 x 8.   2 x 8.  2 x 8. X 1 minute each. For 1 minute each leg. For 1 minute each leg. X. For 1 minute each leg. Supine l/r piriformis stretching by PT with SB use for 1 minute each leg. For 1 minute each leg by PT. For 1 minute each leg by PT. X.   3 x 8.   2 x 15. With blue band around knees 2 x 10. X 25 reps with blue band around knees. X 25 reps with black band around knees. X 25 reps each leg X 25 reps. . X 25 reps. .. X 25 reps. ..   2 x 10.  2 x 10. PPT while hook lying with ball squeezing 2 x 10. X 15 reps. .. X 15 reps. . X 10 reps. .. X 15 reps. . X 15 reps. . X 15 reps. .   X 12 reps each leg. HEP. BLFOs as HEP.  HEP.  - HEP.  - HEP. -   While sitting:     A KE x 12 each leg.    B SR with red tubing 2 x 10. While sitting:    A KE x 10 l/r. .  B SR while sitting with red tubing use x 20. While sitting:    A KE x 10 each leg. B SR using red tubing x 20 reps. .. While right side lying:   L HA 2 X 5. While sitting:    A KE x 12 l/r. Leane Sherine While right side lying:   L HA 3 x 5. A KE while sitting x 12 each leg. While right side lying:   L HA 3 x 5. Active KE l/r while sitting x 15 l/r. .. L HA while R side lying 3 x 5. X 15 each leg. 4 x 5 L HA while right side lying. With ankle pumps l/r. .. HEP. X 15 each leg. L HA while right side lying 4 x 5. A/P x 20. A/P x 20. Wide balance board a/p x 20. Wide balance board a/p x 20. Wide balance board a/p x 20.  - Wide balance board a/p x 20.  - Wide balance board a/p x 20 reps. .   4\" [facility] x 10 each leg with HHA prn. . 4\" [facility] x 12 each leg with HHA. Leane Sherine 4\" [facility] x 12 each leg. 4\" [facility] x 15 each leg with HHA prn. . 6\" [facility] x 8 each leg with HHA prn. .. -   6\" :[facility] x 10 each leg with HHA prn. . 6\"[facility] x 3 reps each leg with light HHA prn. . 6\" [facility] x 12 reps each leg with HHA prn. .   Standing shallow sit-backs x 10 with cueing from PT and as HEP. Standing HA 2 x 5 each leg. Airex DL standing:  No HHA for 1 minute. Marching x 10 l/r. [facility] x 10 l/r. .  Standing R HA x 10. L HA x 5 while right side lying. DL standing:   R HA x 10. Airex marching x 10 l/r. Leane Sherine [facility]  Airex x 10 l/r. .. DL standing:    R HA 2 x 8. .   Marching on Airex x 10 l/r. Leane Sherine [facility] Airex x 10 l/r. Leane Sherine DL standing on the floor R HA 2 x 10. Marching on Airex x 12 l/r. ..  [facility] Airex x 10 l/r. . -      -    - R HA while standing in parallel bars with yellow band around ankle 2 x 8. Airex:   DL standing with B arm lifts x 10. Marching x 10 l/r. .. - R HA while standing with yellow band around ankle 2 x 10. Airex:    B arm lifts x 12 reps. .  Marching x 12 l/r. Leane Sherine          HEP: PPT while hook lying, standing HA, supine clams with red band around knees, and supine piriformis stretch.      Charges: ex3      Total Timed Treatment: 42 min  Total Treatment Time: 42 min

## 2023-07-13 NOTE — TELEPHONE ENCOUNTER
Requesting   Requested Prescriptions     Pending Prescriptions Disp Refills    semaglutide 4 MG/3ML Subcutaneous Solution Pen-injector 3 mL 2     Sig: Inject 1 mg into the skin once a week.      LOV: 4/27/23  RTC: not noted  Filled: 4/27/23 #3 with 2 refills    Future Appointments   Date Time Provider [place]   7/28/2023 11:20 AM Ollie Primrose, APRN [facility] [address]   8/1/2023  1:15 PM Valetta Level, PT [facility] PHYS [address]   8/4/2023  1:15 PM Valetta Level, PT [facility] PHYS [facility] AT [facility]   8/7/2023  1:15 PM Valetta Level, PT [facility] PHYS [facility]   8/9/2023  1:15 PM Valetta Level, PT [facility] PHYS [address]   8/10/2023 12:00 PM Ollie Primrose, APRN [id number]   8/14/2023  1:15 PM Valetta Level, PT [facility] PHYS [address]   8/16/2023  1:15 PM Valetta Level, PT [facility] PHYS [facility]   8/21/2023  1:15 PM Valetta Level, PT [facility] PHYS [facility] AT [facility] AT [facility]   8/29/2023  3:45 PM Valetta Level, PT [facility] PHYS [facility]   8/31/2023 11:45 AM Valetta Level, PT [facility] PHYS [address]

## 2023-07-14 ENCOUNTER — APPOINTMENT (OUTPATIENT)
Dept: PHYSICAL THERAPY | Facility: HOSPITAL | Age: 73
End: 2023-07-14
Attending: FAMILY MEDICINE
Payer: MEDICARE

## 2023-08-01 ENCOUNTER — OFFICE VISIT (OUTPATIENT)
Dept: PHYSICAL THERAPY | Facility: HOSPITAL | Age: 73
End: 2023-08-01
Attending: FAMILY MEDICINE
Payer: MEDICARE

## 2023-08-01 PROCEDURE — 97110 THERAPEUTIC EXERCISES: CPT

## 2023-08-01 NOTE — PROGRESS NOTES
Diagnosis:   Associated DX:  Piriformis syndrome of right side (G57.01)    Referring Provider: No ref. provider found  Date of Evaluation:    4/26/23    Precautions:  None Next MD visit:   October 2023 with surgeon. Date of Surgery: May 2022. Insurance Primary/Secondary: MEDICARE / 3 [facility]     # Auth Visits: 10                Subjective: R low back aching pain is rated 7/10 when walking into the clinic this afternoon. It is a 0/10 when sitting; most of the time. Notes [place] cane use prn, and [place] the airport [place] cane, and is able to do light gardening. Notes taking 600mg of Motirin prn, but only because she was sore after having to move some patio furniture recently. Stairs are being negotiated reciprocally. Report standing tolerance of 30-45 minutes. Dressing and bathing are reportedly normal.    Pain: 0-7/10    Objective:   Sit to standing with some UE assist.    Trendelenberg deviation noted while ambulating, especially during right stance phase, and excessive pronation left foot/LE noted. TL ROM standing:  Flexion 65. Extension 5. Strength of hip abduction:  R 2+/5. L 4-/5. Hip flexion has improved to 4- to 4/5. Hip flexion PROM is now 115 degrees bilaterally. 6\" Anterior step-ups are weaker on R and do require some UE assist.     Assessment:  Is reportedly more functional overall. Goals:  (to be met in 10 visits)   Sit to/from standing without UE assist and no pain. Progress. Up/down a flight of stairs reciprocally with railing use prn. .   Met. Pt will report improved tolerance to house work. Progress. Pt report standing tolerance of at least 30 minutes with her ADLs. Met. Pt will be independent with an upgraded HEP. Ongoing, being met. Plan: Continue skilled Physical Therapy 1-2 x/week or a total of 10 additional visits over a 90 day period.    Treatment will continue to include:  LE/core strengthening, education, and a functional progression as tolerated and as appropriate. Date:   5/4/23              TX#: 3/10 Date:  5/9/23               TX#: 4/10 Date:   5/12/23              TX#: 5/10 Date: 5/16/23  Tx#: 6/10 5/25/23  #7/10 6/14/23  #8/10 7/6/23  #9/10 7/10/23  #10/10 7/12/23  #1 add'l... 8/1/23  #2 add'l... NuStep L5 for 6 minutes. NuStep (9/9) load 6 for 5 minutes. Load 5 for 6 minutes. Load 5 for 7 minutes. Load 6 for 6 minutes. Load 5 for 6 minutes. Load 6 for 6 minutes. NuStep (9/9) load 6 for 6 minutes. Load 6 for 6 minutes  NuStep (9/9) load 6 for 6 minutes. 3 x 10. AA DKTC with heels on SB 2 x 10.  2 x 10. X 20 reps. . DKTC with SB x 25 reps. . DKTC with heels on SB 2 x 10. X 20. X 20. DKTC with heels on SB x 25 reps. . X 25 reps. .   X 20 l/r. Willie Edilson LTR with calves on SB x 20 l/r. Willie Edilson LTR with calves on SB x 20 l/r. . X 20 l/r. Willie Edilson LTR with SB x 20 l/r. .. LTR with calves on SB x 20 l/r. .. X 20 l/r. . X 20 l/r. Willie Edilson LTR with calves on SB x 20 l/r. . X 20 l/r. ..   2 x 10. Short range bridging with calves on SB 2 x 10. Short range bridging with calves on SB 2 x 10.  2 x 10.  2 x 10. Short range bridging with calves on SB x 10 reps. . 2 x 8.   2 x 8.  2 x 8.  2 x 10. X 1 minute each. For 1 minute each leg. For 1 minute each leg. X. For 1 minute each leg. Supine l/r piriformis stretching by PT with SB use for 1 minute each leg. For 1 minute each leg by PT. For 1 minute each leg by PT. X. For 1 minute each leg. 3 x 8.   2 x 15. With blue band around knees 2 x 10. X 25 reps with blue band around knees. X 25 reps with black band around knees. X 25 reps each leg X 25 reps. . X 25 reps. .. X 25 reps. .. Supine clam shells x 25 reps with black band around knees. 2 x 10.  2 x 10. PPT while hook lying with ball squeezing 2 x 10. X 15 reps. .. X 15 reps. . X 10 reps. .. X 15 reps. . X 15 reps. . X 15 reps. . X 15 reps. X 12 reps each leg. HEP. BLFOs as HEP. HEP.  - HEP.  - HEP. - -   While sitting:     A KE x 12 each leg. B SR with red tubing 2 x 10. While sitting:    A KE x 10 l/r. .  B SR while sitting with red tubing use x 20. While sitting:    A KE x 10 each leg. B SR using red tubing x 20 reps. .. While right side lying:   L HA 2 X 5. While sitting:    A KE x 12 l/r. Leane Sherine While right side lying:   L HA 3 x 5. A KE while sitting x 12 each leg. While right side lying:   L HA 3 x 5. Active KE l/r while sitting x 15 l/r. .. L HA while R side lying 3 x 5. X 15 each leg. 4 x 5 L HA while right side lying. With ankle pumps l/r. .. HEP. X 15 each leg. L HA while right side lying 4 x 5. X 20 l/r. Leane Sherine L ELDER while right side lying 2 x 10. A/P x 20. A/P x 20. Wide balance board a/p x 20. Wide balance board a/p x 20. Wide balance board a/p x 20.  - Wide balance board a/p x 20.  - Wide balance board a/p x 20 reps. . A/P x 20. B SR using peach tubing while tall sitting 2  10.    4\" [facility] x 10 each leg with HHA prn. . 4\" [facility] x 12 each leg with HHA. Leane Sherine 4\" [facility] x 12 each leg. 4\" [facility] x 15 each leg with HHA prn. . 6\" [facility] x 8 each leg with HHA prn. .. -   6\" :[facility] x 10 each leg with HHA prn. . 6\"[facility] x 3 reps each leg with light HHA prn. . 6\" [facility] x 12 reps each leg with HHA prn. . X 12 reps each leg. Standing shallow sit-backs x 10 with cueing from PT and as HEP. Standing HA 2 x 5 each leg. Airex DL standing:  No HHA for 1 minute. Marching x 10 l/r. [facility] x 10 l/r. .  Standing R HA x 10. L HA x 5 while right side lying. DL standing:   R HA x 10. Airex marching x 10 l/r. Leane Sherine [facility]  Airex x 10 l/r. .. DL standing:    R HA 2 x 8. .   Marching on Airex x 10 l/r. Leane Sherine [facility] Airex x 10 l/r. Leane Sherine DL standing on the floor R HA 2 x 10. Marching on Airex x 12 l/r. ..  [facility] Airex x 10 l/r. . -      -    - R HA while standing in parallel bars with yellow band around ankle 2 x 8. Airex:   DL standing with B arm lifts x 10. Marching x 10 l/r. .. - R HA while standing with yellow band around ankle 2 x 10. Airex:    B arm lifts x 12 reps. .  Marching x 12 l/r. [place] 2 x 12. Airex:   B arm lifts x 15. Marching x 15 l/r. [place] HEP: PPT while hook lying, standing HA, supine clams with red band around knees, and supine piriformis stretch.      Charges: ex3      Total Timed Treatment: 42 min  Total Treatment Time: 42 min

## 2023-08-03 ENCOUNTER — OFFICE VISIT (OUTPATIENT)
Dept: INTERNAL MEDICINE CLINIC | Facility: CLINIC | Age: 73
End: 2023-08-03
Payer: MEDICARE

## 2023-08-03 VITALS
BODY MASS INDEX: 31.49 KG/M2 | HEIGHT: 65 IN | DIASTOLIC BLOOD PRESSURE: 78 MMHG | HEART RATE: 80 BPM | WEIGHT: 189 LBS | RESPIRATION RATE: 16 BRPM | SYSTOLIC BLOOD PRESSURE: 130 MMHG

## 2023-08-03 DIAGNOSIS — I10 ESSENTIAL HYPERTENSION, BENIGN: ICD-10-CM

## 2023-08-03 DIAGNOSIS — E53.8 VITAMIN B12 DEFICIENCY: ICD-10-CM

## 2023-08-03 DIAGNOSIS — I10 ESSENTIAL HYPERTENSION: ICD-10-CM

## 2023-08-03 DIAGNOSIS — E55.9 VITAMIN D DEFICIENCY: ICD-10-CM

## 2023-08-03 DIAGNOSIS — G89.29 CHRONIC MIDLINE LOW BACK PAIN WITHOUT SCIATICA: ICD-10-CM

## 2023-08-03 DIAGNOSIS — Z51.81 ENCOUNTER FOR THERAPEUTIC DRUG MONITORING: Primary | ICD-10-CM

## 2023-08-03 DIAGNOSIS — M54.50 CHRONIC MIDLINE LOW BACK PAIN WITHOUT SCIATICA: ICD-10-CM

## 2023-08-03 DIAGNOSIS — E66.9 OBESITY (BMI 30.0-34.9): ICD-10-CM

## 2023-08-03 PROCEDURE — 99213 OFFICE O/P EST LOW 20 MIN: CPT | Performed by: NURSE PRACTITIONER

## 2023-08-03 NOTE — PATIENT INSTRUCTIONS
Next steps:  1. Fill your prescribed medication and take as discussed and prescribed: ozempic 1mg weekly   2. Schedule a personal nutrition consultation with one of our registered dieticians       1. Drink water with meals and throughout the day, cut down on soda and/or juice if consumed. Consider flavored water options like Bubbly, Spindrift, Hint and Maryann. 2.  Eat breakfast daily and focus on having protein with each meal, examples include: greek yogurt, cottage cheese, hard boiled egg, whole grain toast with peanut butter. 3.  Reduce refined carbohydrates and sugars which includes items such as sweets, as well as rice, pasta, and bread and make sure to choose whole grain options when having them with just 1 serving per meal about the size of your inner palm. 4.  Consume non starchy veggies daily working towards making them a good 50% of your daily food intake. Add them to lunch and dinner consistently. 5.  Start a daily probiotic: VSL#3 is recommended, (order on line at www.vsl3. com). Take 1 capsule daily with water for 30 days, then reduce to 1 every other day (this will reduce the cost). Capsules can be left out for 2 weeks, but then must be refrigerated. Please download saskia My Fitness Rose Reading! Or Net Diary to monitor daily dietary intake and you will be able to see if you are eating the right amount of calories or too much or too little which would hinder weight loss. Additionally this will help to see your daily carbohydrate and protein intake. When you set the saskia up choose 1-2 lbs/week as a goal.  Keeping a paper food journal is an option as well to remain accountable for your choices- this is the start to mindful eating! A low calorie diet has been consistently shown to support weight loss. Continue or start exercising to help establish a routine. If not already exercising begin with 1 day and progress as able with long-term goal of 30 minutes 5 days a week at a minimum. Meditation daily can help manage and control stress. Chronic stress can make weight loss difficult. Exercising is one way to help with stress, but meditation using the CALM Clare or another comparable alternative can be done in your home or place of work with little time commitment. This Clare can also help work on behavior change and improve sleep. Check out the segment under Calm Masterclass and listen to The 4 Pillars of Health. A great way to begin learning about the foundation of lifestyle with practical tips to use in your every day. Check out www.yourweightmatters. org blog for continued daily support and education along this weight loss journey! Patient Resources:     Personal Training/Fitness Classes/Health Coaching     [facility] and Bart Magana @ [email or web address] Full fitness center with group fitness and personal training. Discount available as client of [facility]. Health Coaching and Personal Training with Tati Rivas at our [facility]- individual weekly coaching with option to add personal training and small group fitness classes targeted at weight loss- [phone] and/or email @ [email or web address]  360FIT [place] [email or web address] Group Fitness [phone] and/or email Brandie Patel at [email or web address]. The Noun Project  [address] [facility] with multiple locations: [employer]et[employer] (www.Urtak), Eat The HooftyMatch (www.Mandae. The Noun Project), Fit Body Bootcamp (www.TomfoolerybodyBetterDoctorotClickFactsp.The Noun Project), RockThePost (www.Rover Apps), The Exercise  (www.exercisecoach.The Noun Project)     Online Fitness  Fitness  on [employer] in 10 DVD series- www. stobn96GBN. The Noun Project  Sit and Be Fit - Chair exercise series Www.sitandbefit. org  Hip Hop Fit with Dominic Martínez at www.hiphopfit. net     Apps for on the OraHealth 7 Minute Workout (orange box with white 7) - free on the go HIIT training clare  Peloton Clare @ www. Sumerian     Nutrition Trackers and Tools  LoseIT! And My Fitness Pal apps and on line for tracking nutrition  NOOM - virtual health coaching  FitFoundation (healthy meals on the go) in [facility] @ www. cbfwkuwlcnjig6h. Cyril Malin MD @ www.Lambda OpticalSystemsdHW and Zak Ports (keto and low carb plans recommended) @ www. NMKQJT46.QAD, Metabolic Meals @ www. MyMetabolicMeals. com - individual prepared meals to go  RoyaltyShare, BuildForge, International Business Machines, Every Plate, PlayEarth- on line meal delivery programs for preparation at home  AK uFaber in Magazine for homemade meals to go @ wwwAuvik Networks. [employer] [employer]wide  Diet Doctor @ www. dietdoctor. [employer] - low carb swaps  Yummly - meal prep and planning clare (www.yummly. com)     Stress Management/Behavior/Mindful Eating  CALM meditation clare (www.Hungerstation.com)  Headspace  Am I Hungry? Mindful eating virtual  clare  Www.yourweightmatters. org - Obesity Action Coalition sponsored Blog posts daily  Motivation clare (black box with white \")- daily supportive messages sent to your phone     Books/Video Education/Podcasts  Mindless Eating by Eunice Delvalle  Why We Get Sick by Veronica Ma (a book about insulin resistance)  Atomic Habits by Chemo Dominguez (a book about taking small steps to promote greater behavior change)   Can't Hurt Me by Yeimy Murry (a book exploring the power of discipline in achieving your goals)  The End of Dieting: How to Live for Life by Dr. Violetta Mcpherson M.D. or listen to The 1995 Located within [facility] Episode 61: Understanding \"Nutritarian\" Eating w/Dr. Violetta Mcpherson  Your Body in Balance: The World [employer] of Food, Hormones, and Health by Dr. Argentina Forrester  The Menopause Diet Plan by Jeanne Rivas and [facility] AT [facility]  The Complete Guide to fasting by Dr. Myranda Singh, [address] [facility] by Tanisha Haddad, Ph.D, R.D.   Weight Loss Surgery Will Not Treat Food Addiction by Harriet Buerger Ph.D  The Game Changers- Dials Documentary on plant based nutrition  Fed Up - documentary about obesity (Free on Utube)  The Truth About Sugar - documentary on sugar (Free on Utube, https://youtu. be/1X8scajEI5a)  The Dr. Mariluz Douglas by Dr. Jessie Prescott MD  Fitlosophy Fitspiration - journal to better health (found at [employer] in fitness aisle)  What Happened to You?- a look at the impact trauma has on behavior written by Tony Granados and Dr. Adrian Murphy Again by Gill Lopez - discovering your true self after trauma  Claire Warner talk on [employer], The Call to Courage  Podcasts: The Exam Room by the Physician's Committee, Nutrition Facts by Dr. Jasmin Yates    We are here to support you with weight loss, but please remember that you still need your primary care provider for your routine health maintenance.

## 2023-08-04 ENCOUNTER — OFFICE VISIT (OUTPATIENT)
Dept: PHYSICAL THERAPY | Facility: HOSPITAL | Age: 73
End: 2023-08-04
Attending: FAMILY MEDICINE
Payer: MEDICARE

## 2023-08-04 PROCEDURE — 97110 THERAPEUTIC EXERCISES: CPT

## 2023-08-04 NOTE — PROGRESS NOTES
Diagnosis:   Associated DX:  Piriformis syndrome of right side (G57.01)    Referring Provider: No ref. provider found  Date of Evaluation:    4/26/23    Precautions:  None Next MD visit:   October 2023 with surgeon. Date of Surgery: May 2022. Insurance Primary/Secondary: MEDICARE / [facility]     # Auth Visits: 10                Subjective: R low back aching pain is rated 4-5/10 when walking into the clinic this afternoon. It is a 0/10 when sitting; most of the time. Notes [place] cane use prn, and at the airport without her cane, and is able to do light gardening. Stairs are being negotiated reciprocally. Report standing tolerance of 30-45 minutes. Dressing and bathing are reportedly normal.    Pain: 0-5/10    Objective:   Sit to standing with some UE assist.    Trendelenberg deviation noted while ambulating, especially during right stance phase, and excessive pronation left foot/LE noted. TL ROM standing:  Flexion 65. Extension 5. Strength of hip abduction:  R 2+/5. L 4-/5. Hip flexion has improved to 4- to 4/5. Hip flexion PROM is now 115 degrees bilaterally. 6\" Anterior step-ups are weaker on R and do require some UE assist.     Assessment:  Is reportedly functioning better overall. Goals:  (to be met in 10 visits)   Sit to/from standing without UE assist and no pain. Progress. Up/down a flight of stairs reciprocally with railing use prn. .   Met. Pt will report improved tolerance to house work. Progress. Pt report standing tolerance of at least 30 minutes with her ADLs. Met. Pt will be independent with an upgraded HEP. Ongoing, being met. Plan: Continue skilled Physical Therapy 1-2 x/week or a total of 10 additional visits over a 90 day period. Treatment will continue to include:  LE/core strengthening, education, and a functional progression as tolerated and as appropriate.         Date: 5/12/23              TX#: 5/10 Date: 5/16/23  Tx#: 6/10 5/25/23  #7/10 6/14/23  #8/10 7/6/23  #9/10 7/10/23  #10/10 7/12/23  #1 add'l... 8/1/23  #2 add'l... 8/4/23  #3 add'l rxs. .. Load 5 for 6 minutes. Load 5 for 7 minutes. Load 6 for 6 minutes. Load 5 for 6 minutes. Load 6 for 6 minutes. NuStep (9/9) load 6 for 6 minutes. Load 6 for 6 minutes  NuStep (9/9) load 6 for 6 minutes. NuStep load 6 for 7 minutes. 2 x 10. X 20 reps. . DKTC with SB x 25 reps. . DKTC with heels on SB 2 x 10. X 20. X 20. DKTC with heels on SB x 25 reps. . X 25 reps. . X 20. LTR with calves on SB x 20 l/r. . X 20 l/r. Emiliano Puff LTR with SB x 20 l/r. .. LTR with calves on SB x 20 l/r. .. X 20 l/r. . X 20 l/r. Emiliano Puff LTR with calves on SB x 20 l/r. . X 20 l/r. .. X 20 l/r. Emiliano Puff Short range bridging with calves on SB 2 x 10.  2 x 10.  2 x 10. Short range bridging with calves on SB x 10 reps. . 2 x 8.   2 x 8.  2 x 8.  2 x 10.  2 x 10. For 1 minute each leg. X. For 1 minute each leg. Supine l/r piriformis stretching by PT with SB use for 1 minute each leg. For 1 minute each leg by PT. For 1 minute each leg by PT. X. For 1 minute each leg. HEP. With blue band around knees 2 x 10. X 25 reps with blue band around knees. X 25 reps with black band around knees. X 25 reps each leg X 25 reps. . X 25 reps. .. X 25 reps. .. Supine clam shells x 25 reps with black band around knees. X 25 reps. Emiliano Puff PPT while hook lying with ball squeezing 2 x 10. X 15 reps. .. X 15 reps. . X 10 reps. .. X 15 reps. . X 15 reps. . X 15 reps. . X 15 reps. X 15 reps. Emiliano Puff BLFOs as HEP.  HEP.  - HEP.  - HEP. - -     While sitting:    A KE x 10 each leg. B SR using red tubing x 20 reps. .. While right side lying:   L HA 2 X 5. While sitting:    A KE x 12 l/r. Emiliano Puff While right side lying:   L HA 3 x 5. A KE while sitting x 12 each leg. While right side lying:   L HA 3 x 5. Active KE l/r while sitting x 15 l/r. .. L HA while R side lying 3 x 5.  X 15 each leg.         4 x 5 L HA while right side lying. With ankle pumps l/r. .. HEP. X 15 each leg. L HA while right side lying 4 x 5. X 20 l/r. Yue Blunt L ELDER while right side lying 2 x 10. A KE while sitting x 20 l/r. [place] Blunt L ELDER while right side lying 2 x 10. Wide balance board a/p x 20. Wide balance board a/p x 20. Wide balance board a/p x 20.  - Wide balance board a/p x 20.  - Wide balance board a/p x 20 reps. . A/P x 20. B SR using peach tubing while tall sitting 2  10. Wooden balance board a/p x 20. B SR with red tubing use 2 x 10 while tall sitting. 4\" [facility] x 12 each leg. 4\" [facility] x 15 each leg with HHA prn. . 6\" [facility] x 8 each leg with HHA prn. .. -   6\" :[facility] x 10 each leg with HHA prn. . 6\"[facility] x 3 reps each leg with light HHA prn. . 6\" [facility] x 12 reps each leg with HHA prn. . X 12 reps each leg. 6\" [facility] x 12 reps each leg with HHA prn. .    DL standing:   R HA x 10. Airex marching x 10 l/r. Yue Blunt [facility]  Airex x 10 l/r. .. DL standing:    R HA 2 x 8. .   Marching on Airex x 10 l/r. [place] Blunt [facility] Airex x 10 l/r. Yue Blunt DL standing on the floor R HA 2 x 10. Marching on Airex x 12 l/r. ..  [facility] Airex x 10 l/r. . -      -    - R HA while standing in parallel bars with yellow band around ankle 2 x 8. Airex:   DL standing with B arm lifts x 10. Marching x 10 l/r. .. - R HA while standing with yellow band around ankle 2 x 10. Airex:    B arm lifts x 12 reps. .  Marching x 12 l/r. [place] Blunt 2 x 12. Airex:   B arm lifts x 15. Marching x 15 l/r. . Standing R HA with yellow band around knees 2 x 12. Airex:    B arm lifts x 15. Marching x 15 each leg. HEP: PPT while hook lying, standing HA, supine clams with red band around knees, and supine piriformis stretch.      Charges: ex3      Total Timed Treatment: 42 min  Total Treatment Time: 42 min

## 2023-08-07 ENCOUNTER — OFFICE VISIT (OUTPATIENT)
Dept: PHYSICAL THERAPY | Facility: HOSPITAL | Age: 73
End: 2023-08-07
Attending: FAMILY MEDICINE
Payer: MEDICARE

## 2023-08-07 PROCEDURE — 97110 THERAPEUTIC EXERCISES: CPT

## 2023-08-07 NOTE — PROGRESS NOTES
Diagnosis:   Associated DX:  Piriformis syndrome of right side (G57.01)    Referring Provider: No ref. provider found  Date of Evaluation:    4/26/23    Precautions:  None Next MD visit:   October 2023 with surgeon. Date of Surgery: May 2022. Insurance Primary/Secondary: MEDICARE / [address] [place] [address]     # Auth Visits: 10                Subjective: R low back aching pain is \"low today\" and is rated 3/10 when walking into the clinic this afternoon. It is a 0/10 when sitting; most of the time. Notes [place] with cane use prn, and at the airport without her cane, and is able to do light gardening. Stairs are being negotiated reciprocally. Report standing tolerance of 30-45 minutes. Dressing and bathing are reportedly normal.    Pain: 0-3/10    Objective:   Sit to standing with some UE assist.    %O2 saturation was 98 and HR was 95bpm after NuStep use. Trendelenberg deviation noted while ambulating, especially during right stance phase, and excessive pronation left foot/LE noted. TL ROM standing:  Flexion 65. Extension 5. Strength of hip abduction:  R 2+/5. L 4-/5. Hip flexion has improved to 4- to 4/5. Hip flexion PROM is now 115 degrees bilaterally. 6\" Anterior step-ups are weaker on R and do require some UE assist.     Assessment:  Is reportedly functioning better overall. Goals:  (to be met in 10 visits)   Sit to/from standing without UE assist and no pain. Progress. Up/down a flight of stairs reciprocally with railing use prn. .   Met. Pt will report improved tolerance to house work. Progress. Pt report standing tolerance of at least 30 minutes with her ADLs. Met. Pt will be independent with an upgraded HEP. Ongoing, being met. Plan: Continue skilled Physical Therapy 1-2 x/week or a total of 10 additional visits over a 90 day period.    Treatment will continue to include:  LE/core strengthening, education, and a functional progression as tolerated and as appropriate. 5/25/23  #7/10 6/14/23  #8/10 7/6/23  #9/10 7/10/23  #10/10 7/12/23  #1 add'l... 8/1/23  #2 add'l... 8/4/23  #3 add'l rxs. .. 8/7/23  #4 add'l rx. ... Load 6 for 6 minutes. Load 5 for 6 minutes. Load 6 for 6 minutes. NuStep (9/9) load 6 for 6 minutes. Load 6 for 6 minutes  NuStep (9/9) load 6 for 6 minutes. NuStep load 6 for 7 minutes. Load 6 for 8 minutes. DKTC with SB x 25 reps. . DKTC with heels on SB 2 x 10. X 20. X 20. DKTC with heels on SB x 25 reps. . X 25 reps. . X 20. DKTC with heels on SB x 25 reps. LTR with SB x 20 l/r. .. LTR with calves on SB x 20 l/r. .. X 20 l/r. . X 20 l/r. Stockbridge Anson LTR with calves on SB x 20 l/r. . X 20 l/r. .. X 20 l/r. Stockbridge Anson LTR with calves on SB x 20 l/r.   2 x 10. Short range bridging with calves on SB x 10 reps. . 2 x 8.   2 x 8.  2 x 8.  2 x 10.  2 x 10.  2 x 10. For 1 minute each leg. Supine l/r piriformis stretching by PT with SB use for 1 minute each leg. For 1 minute each leg by PT. For 1 minute each leg by PT. X. For 1 minute each leg. HEP.  HEP. X 25 reps with black band around knees. X 25 reps each leg X 25 reps. . X 25 reps. .. X 25 reps. .. Supine clam shells x 25 reps with black band around knees. X 25 reps. . X 25 reps. .. X 15 reps. . X 10 reps. .. X 15 reps. . X 15 reps. . X 15 reps. . X 15 reps. X 15 reps. Stockbridge Anson PPT while hook lying with ball squeezing 5 second holds x 15 reps. ..   - HEP.  - HEP. - -     A KE while sitting x 12 each leg. While right side lying:   L HA 3 x 5. Active KE l/r while sitting x 15 l/r. .. L HA while R side lying 3 x 5. X 15 each leg. 4 x 5 L HA while right side lying. With ankle pumps l/r. .. HEP. X 15 each leg. L HA while right side lying 4 x 5. X 20 l/r. Stockbridge Anson L ELDER while right side lying 2 x 10. A KE while sitting x 20 l/r. Stockbridge Anson L ELDER while right side lying 2 x 10. X 20 reps each leg. 2 x 12. Wide balance board a/p x 20.   - Wide balance board a/p x 20.  - Wide balance board a/p x 20 reps. . A/P x 20. B SR using peach tubing while tall sitting 2  10. Wooden balance board a/p x 20. B SR with red tubing use 2 x 10 while tall sitting. A/P x 20. X 20 reps. .   6\" [facility] x 8 each leg with HHA prn. .. -   6\" :[facility] x 10 each leg with HHA prn. . 6\"[facility] x 3 reps each leg with light HHA prn. . 6\" [facility] x 12 reps each leg with HHA prn. . X 12 reps each leg. 6\" [facility] x 12 reps each leg with HHA prn. . X 12 reps each leg. DL standing on the floor R HA 2 x 10. Marching on Airex x 12 l/r. ..  [facility] Airex x 10 l/r. . -      -    - R HA while standing in parallel bars with yellow band around ankle 2 x 8. Airex:   DL standing with B arm lifts x 10. Marching x 10 l/r. .. - R HA while standing with yellow band around ankle 2 x 10. Airex:    B arm lifts x 12 reps. .  Marching x 12 l/r. Tomasa Led 2 x 12. Airex:   B arm lifts x 15. Marching x 15 l/r. . Standing R HA with yellow band around knees 2 x 12. Airex:    B arm lifts x 15. Marching x 15 each leg. Standing R HA with yellow band around ankle 2 x 12. Airex:   B arm lifts x 15. Marching x 15 each leg. Forward tapping to a 6\" step x 10 each leg. HEP: PPT while hook lying, standing HA, supine clams with red band around knees, and supine piriformis stretch.      Charges: ex3      Total Timed Treatment: 42 min  Total Treatment Time: 42 min

## 2023-08-09 ENCOUNTER — OFFICE VISIT (OUTPATIENT)
Dept: PHYSICAL THERAPY | Facility: HOSPITAL | Age: 73
End: 2023-08-09
Attending: FAMILY MEDICINE
Payer: MEDICARE

## 2023-08-09 PROCEDURE — 97110 THERAPEUTIC EXERCISES: CPT

## 2023-08-09 NOTE — PROGRESS NOTES
Diagnosis:   Associated DX:  Piriformis syndrome of right side (G57.01)    Referring Provider: No ref. provider found  Date of Evaluation:    4/26/23    Precautions:  None Next MD visit:   October 2023 with surgeon. Date of Surgery: May 2022. Insurance Primary/Secondary: MEDICARE / [facility]     # Auth Visits: 10                Subjective:  R low back aching pain is rated 6/10 when walking into the clinic this afternoon. 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 Stairs are being negotiated reciprocally. Report standing tolerance of 30-45 minutes. Dressing and bathing are reportedly normal.    Pain:   6/10    Objective:   Sit to standing with some UE assist.    TL ROM standing:  Flexion 65. Extension 5. Strength of hip abduction:  R 2+/5. L 4-/5. Hip flexion has improved to 4- to 4/5. Hip flexion PROM is now 115 degrees bilaterally. Assessment:  Gradually improving functional endurance. Goals:  (to be met in 10 visits)   Sit to/from standing without UE assist and no pain. Progress. Up/down a flight of stairs reciprocally with railing use prn. .   Met. Pt will report improved tolerance to house work. Progress. Pt report standing tolerance of at least 30 minutes with her ADLs. Met. Pt will be independent with an upgraded HEP. Ongoing, being met. Plan: Continue skilled Physical Therapy 1-2 x/week or a total of 10 additional visits over a 90 day period. Treatment will continue to include:  LE/core strengthening, education, and a functional progression as tolerated and as appropriate. 5/25/23  #7/10 6/14/23  #8/10 7/6/23  #9/10 7/10/23  #10/10 7/12/23  #1 add'l... 8/1/23  #2 add'l... 8/4/23  #3 add'l rxs. .. 8/7/23  #4 add'l rx. ... 8/9/23  #5   Load 6 for 6 minutes. Load 5 for 6 minutes. Load 6 for 6 minutes. NuStep (9/9) load 6 for 6 minutes.   Load 6 for 6 minutes  NuStep (9/9) load 6 for 6 minutes. NuStep load 6 for 7 minutes. Load 6 for 8 minutes. Load 6 for    DKTC with SB x 25 reps. . DKTC with heels on SB 2 x 10. X 20. X 20. DKTC with heels on SB x 25 reps. . X 25 reps. . X 20. DKTC with heels on SB x 25 reps. DKTC with heels on SB x 25 reps. .. LTR with SB x 20 l/r. .. LTR with calves on SB x 20 l/r. .. X 20 l/r. . X 20 l/r. Mariellen Isadora LTR with calves on SB x 20 l/r. . X 20 l/r. .. X 20 l/r. Mariellen Isadora LTR with calves on SB x 20 l/r. LTR with calves on SB x 20 l/r.   2 x 10. Short range bridging with calves on SB x 10 reps. . 2 x 8.   2 x 8.  2 x 8.  2 x 10.  2 x 10.  2 x 10.  2 x 10. For 1 minute each leg. Supine l/r piriformis stretching by PT with SB use for 1 minute each leg. For 1 minute each leg by PT. For 1 minute each leg by PT. X. For 1 minute each leg. HEP.  HEP.  HEP. X 25 reps with black band around knees. X 25 reps each leg X 25 reps. . X 25 reps. .. X 25 reps. .. Supine clam shells x 25 reps with black band around knees. X 25 reps. . X 25 reps. .. X.   X 15 reps. . X 10 reps. .. X 15 reps. . X 15 reps. . X 15 reps. . X 15 reps. X 15 reps. Mariellen Isadora PPT while hook lying with ball squeezing 5 second holds x 15 reps. .. -   - HEP.  - HEP. - -      A KE while sitting x 12 each leg. While right side lying:   L HA 3 x 5. Active KE l/r while sitting x 15 l/r. .. L HA while R side lying 3 x 5. X 15 each leg. 4 x 5 L HA while right side lying. With ankle pumps l/r. .. HEP. X 15 each leg. L HA while right side lying 4 x 5. X 20 l/r. Mariellen Isadora L ELDER while right side lying 2 x 10. A KE while sitting x 20 l/r. Mariellen Isadora L ELDER while right side lying 2 x 10. X 20 reps each leg. 2 x 12. A KE x 20 l/r. Mariellen Isadora L ELDER while right side lying 2 x 12. Wide balance board a/p x 20.  - Wide balance board a/p x 20.  - Wide balance board a/p x 20 reps. . A/P x 20. B SR using peach tubing while tall sitting 2  10. Wooden balance board a/p x 20.      B SR with red tubing use 2 x 10 while tall sitting. A/P x 20. X 20 reps. . X 20. X 20 reps. .   6\" [facility] x 8 each leg with HHA prn. .. -   6\" :[facility] x 10 each leg with HHA prn. . 6\"[facility] x 3 reps each leg with light HHA prn. . 6\" [facility] x 12 reps each leg with HHA prn. . X 12 reps each leg. 6\" [facility] x 12 reps each leg with HHA prn. . X 12 reps each leg. X 12 reps each leg. DL standing on the floor R HA 2 x 10. Marching on Airex x 12 l/r. ..  [facility] Airex x 10 l/r. . -      -    - R HA while standing in parallel bars with yellow band around ankle 2 x 8. Airex:   DL standing with B arm lifts x 10. Marching x 10 l/r. .. - R HA while standing with yellow band around ankle 2 x 10. Airex:    B arm lifts x 12 reps. .  Marching x 12 l/r. Magalis Coffer 2 x 12. Airex:   B arm lifts x 15. Marching x 15 l/r. . Standing R HA with yellow band around knees 2 x 12. Airex:    B arm lifts x 15. Marching x 15 each leg. Standing R HA with yellow band around ankle 2 x 12. Airex:   B arm lifts x 15. Marching x 15 each leg. Forward tapping to a 6\" step x 10 each leg. 2 x 15 R HA with yellow band around ankles. Standing in parallel bars with HHA: big marching x 10 each leg. Standing on floor with side ways tapping to a 6\" step x 10 reps each leg. HEP: PPT while hook lying, standing HA, supine clams with red band around knees, and supine piriformis stretch.      Charges: ex3      Total Timed Treatment: 42 min  Total Treatment Time: 42 min

## 2023-08-14 ENCOUNTER — OFFICE VISIT (OUTPATIENT)
Dept: PHYSICAL THERAPY | Facility: HOSPITAL | Age: 73
End: 2023-08-14
Attending: FAMILY MEDICINE
Payer: MEDICARE

## 2023-08-14 PROCEDURE — 97110 THERAPEUTIC EXERCISES: CPT

## 2023-08-14 NOTE — PROGRESS NOTES
Diagnosis:   Associated DX:  Piriformis syndrome of right side (G57.01)    Referring Provider: No ref. provider found  Date of Evaluation:    4/26/23    Precautions:  None Next MD visit:   October 2023 with surgeon. Date of Surgery: May 2022. Insurance Primary/Secondary: MEDICARE / [address] [place] [address]     # Auth Visits: 10                Subjective:  R low back aching pain is rated 6/10 when walking into the clinic this afternoon. 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 .. Stairs are being negotiated reciprocally. Report standing tolerance of 30-45 minutes. Dressing and bathing are reportedly normal.    Pain:   6/10    Objective:   Sit to standing with some UE assist.    TL ROM standing:  Flexion 65. Extension 5. Strength of hip abduction:  R 2+/5. L 4-/5. Hip flexion has improved to 4- to 4/5. Hip flexion PROM is now 115 degrees bilaterally. Assessment:  Gradually improving functional endurance. Goals:  (to be met in 10 visits)   Sit to/from standing without UE assist and no pain. Progress. Up/down a flight of stairs reciprocally with railing use prn. .   Met. Pt will report improved tolerance to house work. Progress. Pt report standing tolerance of at least 30 minutes with her ADLs. Met. Pt will be independent with an upgraded HEP. Ongoing, being met. Plan: Continue skilled Physical Therapy 1-2 x/week or a total of 10 additional visits over a 90 day period. Treatment will continue to include:  LE/core strengthening, education, and a functional progression as tolerated and as appropriate. 5/25/23  #7/10 6/14/23  #8/10 7/6/23  #9/10 7/10/23  #10/10 7/12/23  #1 add'l... 8/1/23  #2 add'l... 8/4/23  #3 add'l rxs. .. 8/7/23  #4 add'l rx. ... 8/9/23  #5 8/14/23  #6   Load 6 for 6 minutes. Load 5 for 6 minutes.   Load 6 for 6 minutes. NuStep (9/9) load 6 for 6 minutes. Load 6 for 6 minutes  NuStep (9/9) load 6 for 6 minutes. NuStep load 6 for 7 minutes. Load 6 for 8 minutes. Load 6 for 7 minutes. NuStep load 6 for 7 minutes. DKTC with SB x 25 reps. . DKTC with heels on SB 2 x 10. X 20. X 20. DKTC with heels on SB x 25 reps. . X 25 reps. . X 20. DKTC with heels on SB x 25 reps. DKTC with heels on SB x 25 reps. .. X 20. LTR with SB x 20 l/r. .. LTR with calves on SB x 20 l/r. .. X 20 l/r. . X 20 l/r. Ana Jessica LTR with calves on SB x 20 l/r. . X 20 l/r. .. X 20 l/r. Ana Jessica LTR with calves on SB x 20 l/r. LTR with calves on SB x 20 l/r. X 20 l/r. Ana Jessica 2 x 10. Short range bridging with calves on SB x 10 reps. . 2 x 8.   2 x 8.  2 x 8.  2 x 10.  2 x 10.  2 x 10.  2 x 10. -   For 1 minute each leg. Supine l/r piriformis stretching by PT with SB use for 1 minute each leg. For 1 minute each leg by PT. For 1 minute each leg by PT. X. For 1 minute each leg. HEP.  HEP.  HEP. -   X 25 reps with black band around knees. X 25 reps each leg X 25 reps. . X 25 reps. .. X 25 reps. .. Supine clam shells x 25 reps with black band around knees. X 25 reps. . X 25 reps. .. X. -   X 15 reps. . X 10 reps. .. X 15 reps. . X 15 reps. . X 15 reps. . X 15 reps. X 15 reps. Ana Jessica PPT while hook lying with ball squeezing 5 second holds x 15 reps. .. - -   - HEP.  - HEP. - -       A KE while sitting x 12 each leg. While right side lying:   L HA 3 x 5. Active KE l/r while sitting x 15 l/r. .. L HA while R side lying 3 x 5. X 15 each leg. 4 x 5 L HA while right side lying. With ankle pumps l/r. .. HEP. X 15 each leg. L HA while right side lying 4 x 5. X 20 l/r. Ana Jessica L ELDER while right side lying 2 x 10. A KE while sitting x 20 l/r. Ana Jessica L ELDER while right side lying 2 x 10. X 20 reps each leg. 2 x 12. A KE x 20 l/r. Ana Jessica L ELDER while right side lying 2 x 12. A KE x 20 each leg. L HA while right side lying 2 x 12.     Wide balance board a/p x 20.  - Wide balance board a/p x 20.  - Wide balance board a/p x 20 reps. . A/P x 20. B SR using peach tubing while tall sitting 2  10. Wooden balance board a/p x 20. B SR with red tubing use 2 x 10 while tall sitting. A/P x 20. X 20 reps. . X 20. X 20 reps. Viral Molt balance board a/p x 20. While standing B SR with red tubing 2 x 10.    6\" [facility] x 8 each leg with HHA prn. .. -   6\" :[facility] x 10 each leg with HHA prn. . 6\"[facility] x 3 reps each leg with light HHA prn. . 6\" [facility] x 12 reps each leg with HHA prn. . X 12 reps each leg. 6\" [facility] x 12 reps each leg with HHA prn. . X 12 reps each leg. X 12 reps each leg. 6\" dips x 20 L and x 18 R with HHA prn. ..   DL standing on the floor R HA 2 x 10. Marching on Airex x 12 l/r. ..  [facility] Airex x 10 l/r. . -      -    - R HA while standing in parallel bars with yellow band around ankle 2 x 8. Airex:   DL standing with B arm lifts x 10. Marching x 10 l/r. .. - R HA while standing with yellow band around ankle 2 x 10. Airex:    B arm lifts x 12 reps. .  Marching x 12 l/r. Martha Jonas 2 x 12. Airex:   B arm lifts x 15. Marching x 15 l/r. . Standing R HA with yellow band around knees 2 x 12. Airex:    B arm lifts x 15. Marching x 15 each leg. Standing R HA with yellow band around ankle 2 x 12. Airex:   B arm lifts x 15. Marching x 15 each leg. Forward tapping to a 6\" step x 10 each leg. 2 x 15 R HA with yellow band around ankles. Standing in parallel bars with HHA: big marching x 10 each leg. Standing on floor with side ways tapping to a 6\" step x 10 reps each leg. Shuttle DLS:   57# x 10. 31# x 10. 37# x 10 x 2.     R ELDER while standing with red band around ankles 2 x 10. Big marching x 12 l/r with HHA. .. Standing on floor tapping to a 6\" step x 10 each leg, forward and side ways without HHA. HEP: PPT while hook lying, standing HA, supine clams with red band around knees, and supine piriformis stretch.      Charges: ex3      Total Timed Treatment: 44 min  Total Treatment Time: 44min

## 2023-08-16 ENCOUNTER — OFFICE VISIT (OUTPATIENT)
Dept: PHYSICAL THERAPY | Facility: HOSPITAL | Age: 73
End: 2023-08-16
Attending: FAMILY MEDICINE
Payer: MEDICARE

## 2023-08-16 PROCEDURE — 97110 THERAPEUTIC EXERCISES: CPT

## 2023-08-16 NOTE — PROGRESS NOTES
Diagnosis:   Associated DX:  Piriformis syndrome of right side (G57.01)    Referring Provider: No ref. provider found  Date of Evaluation:    4/26/23    Precautions:  None Next MD visit:   October 2023 with surgeon. Date of Surgery: May 2022. Insurance Primary/Secondary: MEDICARE / [address] [place] [address]     # Auth Visits: 10 additional rxs. .           Subjective:  R low back aching pain is rated 6/10 when walking into the clinic this afternoon. Notes that she was tired after the last PT session. 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 .. Stairs are being negotiated reciprocally. Report standing tolerance of 30-45 minutes. Dressing and bathing are reportedly normal.    Pain:   6/10    Objective:   Sit to standing with some UE assist.    %O2 saturation was 97 and HR was 100 at the end of NuStep use. Assessment:  Gradually improving functional endurance. Goals:  (to be met in 10 visits)   Sit to/from standing without UE assist and no pain. Progress. Up/down a flight of stairs reciprocally with railing use prn. .   Met. Pt will report improved tolerance to house work. Progress. Pt report standing tolerance of at least 30 minutes with her ADLs. Met. Pt will be independent with an upgraded HEP. Ongoing, being met. Plan: Continue skilled Physical Therapy 1-2 x/week or a total of 10 additional visits over a 90 day period. Treatment will continue to include:  LE/core strengthening, education, and a functional progression as tolerated and as appropriate. 6/14/23  #8/10 7/6/23  #9/10 7/10/23  #10/10 7/12/23  #1 add'l... 8/1/23  #2 add'l... 8/4/23  #3 add'l rxs. .. 8/7/23  #4 add'l rx. ... 8/9/23  #5 8/14/23  #6 8/16/23  #7   Load 5 for 6 minutes. Load 6 for 6 minutes. NuStep (9/9) load 6 for 6 minutes. Load 6 for 6 minutes  NuStep (9/9) load 6 for 6 minutes. NuStep load 6 for 7 minutes. Load 6 for 8 minutes. Load 6 for 7 minutes. NuStep load 6 for 7 minutes. Load 6 for 7 minutes. DKTC with heels on SB 2 x 10. X 20. X 20. DKTC with heels on SB x 25 reps. . X 25 reps. . X 20. DKTC with heels on SB x 25 reps. DKTC with heels on SB x 25 reps. .. X 20. X 20 reps. Trula Darnell LTR with calves on SB x 20 l/r. .. X 20 l/r. . X 20 l/r. Trula Darnell LTR with calves on SB x 20 l/r. . X 20 l/r. .. X 20 l/r. Trula Darnell LTR with calves on SB x 20 l/r. LTR with calves on SB x 20 l/r. X 20 l/r. . X 20 l/r. Trula Darnell Short range bridging with calves on SB x 10 reps. . 2 x 8.   2 x 8.  2 x 8.  2 x 10.  2 x 10.  2 x 10.  2 x 10. - -   Supine l/r piriformis stretching by PT with SB use for 1 minute each leg. For 1 minute each leg by PT. For 1 minute each leg by PT. X. For 1 minute each leg. HEP.  HEP.  HEP. - -   X 25 reps each leg X 25 reps. . X 25 reps. .. X 25 reps. .. Supine clam shells x 25 reps with black band around knees. X 25 reps. . X 25 reps. .. X. - -   X 10 reps. .. X 15 reps. . X 15 reps. . X 15 reps. . X 15 reps. X 15 reps. Trula Darnell PPT while hook lying with ball squeezing 5 second holds x 15 reps. .. - - -   HEP.  - HEP. - -        Active KE l/r while sitting x 15 l/r. .. L HA while R side lying 3 x 5. X 15 each leg. 4 x 5 L HA while right side lying. With ankle pumps l/r. .. HEP. X 15 each leg. L HA while right side lying 4 x 5. X 20 l/r. Trula Darnell L ELDER while right side lying 2 x 10. A KE while sitting x 20 l/r. Trula Darnell L ELDER while right side lying 2 x 10. X 20 reps each leg. 2 x 12. A KE x 20 l/r. Nanette Patrick L ELDER while right side lying 2 x 12. A KE x 20 each leg. L HA while right side lying 2 x 12. A KE x 20 reps each leg while sitting. 2 x 15. .        - Wide balance board a/p x 20.  - Wide balance board a/p x 20 reps. . A/P x 20. B SR using peach tubing while tall sitting 2  10. Wooden balance board a/p x 20. B SR with red tubing use 2 x 10 while tall sitting. A/P x 20. X 20 reps. . X 20. X 20 reps. Fidel Monroe balance board a/p x 20.      While standing B SR with red tubing 2 x 10. A/P x 20.       2 x 10 using peach tubing.    -   6\" :[facility] x 10 each leg with HHA prn. . 6\"[facility] x 3 reps each leg with light HHA prn. . 6\" [facility] x 12 reps each leg with HHA prn. . X 12 reps each leg. 6\" [facility] x 12 reps each leg with HHA prn. . X 12 reps each leg. X 12 reps each leg. 6\" dips x 20 L and x 18 R with HHA prn. .. 6\" dips with HHA prn:   L x 20. R x 20.    -      -    - R HA while standing in parallel bars with yellow band around ankle 2 x 8. Airex:   DL standing with B arm lifts x 10. Marching x 10 l/r. .. - R HA while standing with yellow band around ankle 2 x 10. Airex:    B arm lifts x 12 reps. .  Marching x 12 l/r. Mabel Humphries 2 x 12. Airex:   B arm lifts x 15. Marching x 15 l/r. . Standing R HA with yellow band around knees 2 x 12. Airex:    B arm lifts x 15. Marching x 15 each leg. Standing R HA with yellow band around ankle 2 x 12. Airex:   B arm lifts x 15. Marching x 15 each leg. Forward tapping to a 6\" step x 10 each leg. 2 x 15 R HA with yellow band around ankles. Standing in parallel bars with HHA: big marching x 10 each leg. Standing on floor with side ways tapping to a 6\" step x 10 reps each leg. Shuttle DLS:   10# x 10. 31# x 10. 37# x 10 x 2.     R ELDER while standing with red band around ankle 2 x 10. Big marching x 12 l/r with HHA. .. Standing on floor tapping to a 6\" step x 10 each leg, forward and side ways without HHA. Shuttle DLS:    3 x 10. R HA while standing with red band around ankle 2 x 10. Big marching x 12 l/r. .    Standing on floor tapping to a 6\" step x 10 each leg each direction. HEP: PPT while hook lying, standing HA, supine clams with red band around knees, and supine piriformis stretch.      Charges: ex3      Total Timed Treatment: 44 min  Total Treatment Time: 44min

## 2023-08-21 ENCOUNTER — OFFICE VISIT (OUTPATIENT)
Dept: PHYSICAL THERAPY | Facility: HOSPITAL | Age: 73
End: 2023-08-21
Attending: FAMILY MEDICINE
Payer: MEDICARE

## 2023-08-21 ENCOUNTER — TELEPHONE (OUTPATIENT)
Dept: PHYSICAL THERAPY | Facility: HOSPITAL | Age: 73
End: 2023-08-21

## 2023-08-21 PROCEDURE — 97110 THERAPEUTIC EXERCISES: CPT

## 2023-08-21 NOTE — PROGRESS NOTES
Diagnosis:   Associated DX:  Piriformis syndrome of right side (G57.01)    Referring Provider: No ref. provider found  Date of Evaluation:    4/26/23    Precautions:  None Next MD visit:   October 2023 with surgeon. Date of Surgery: May 2022. Insurance Primary/Secondary: MEDICARE / [address] [place] [address]     # Auth Visits: 10 additional rxs. .           Subjective:  R low back aching pain is rated 4-5/10 when walking into the clinic this afternoon. 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 .. Stairs are being negotiated reciprocally. Report standing tolerance of 30-45 minutes. Dressing and bathing are reportedly normal.    States that she bought some exercise equipment for home use and plans on transitioning back to the fitness center when PT is done. Pain:   4-5/10. Objective:   Sit to standing with some UE assist.    %O2 saturation was 98 and HR was 115 at the end of NuStep use. Assessment:  Gradually improving functional endurance. Goals:  (to be met in 10 visits)   Sit to/from standing without UE assist and no pain. Progress. Up/down a flight of stairs reciprocally with railing use prn. .   Met. Pt will report improved tolerance to house work. Progress. Pt report standing tolerance of at least 30 minutes with her ADLs. Met. Pt will be independent with an upgraded HEP. Ongoing, being met. Plan: Continue skilled Physical Therapy 1-2 x/week or a total of 10 additional visits over a 90 day period. Treatment will continue to include:  LE/core strengthening, education, and a functional progression as tolerated and as appropriate. 6/14/23  #8/10 7/6/23  #9/10 7/10/23  #10/10 7/12/23  #1 add'l... 8/1/23  #2 add'l... 8/4/23  #3 add'l rxs. .. 8/7/23  #4 add'l rx. ... 8/9/23  #5 8/14/23  #6 8/16/23  #7 8/21/23  #8   Load 5 for 6 minutes. Load 6 for 6 minutes. NuStep (9/9) load 6 for 6 minutes. Load 6 for 6 minutes  NuStep (9/9) load 6 for 6 minutes.   NuStep load 6 for 7 minutes. Load 6 for 8 minutes. Load 6 for 7 minutes. NuStep load 6 for 7 minutes. Load 6 for 7 minutes. Load 6 for 7 minutes. DKTC with heels on SB 2 x 10. X 20. X 20. DKTC with heels on SB x 25 reps. . X 25 reps. . X 20. DKTC with heels on SB x 25 reps. DKTC with heels on SB x 25 reps. .. X 20. X 20 reps. .  DKTC with heels on SB x 20 reps. Sawyer Brannon LTR with calves on SB x 20 l/r. .. X 20 l/r. . X 20 l/r. Sawyer Sheppardhmann LTR with calves on SB x 20 l/r. . X 20 l/r. .. X 20 l/r. Sawyer Sheppardhmann LTR with calves on SB x 20 l/r. LTR with calves on SB x 20 l/r. X 20 l/r. . X 20 l/r. Sawyer Brannon LTR with calves on SB x 20 l/r. Sawyer Sheppardhmann Short range bridging with calves on SB x 10 reps. . 2 x 8.   2 x 8.  2 x 8.  2 x 10.  2 x 10.  2 x 10.  2 x 10. - -    Supine l/r piriformis stretching by PT with SB use for 1 minute each leg. For 1 minute each leg by PT. For 1 minute each leg by PT. X. For 1 minute each leg. HEP.  HEP.  HEP. - -    X 25 reps each leg X 25 reps. . X 25 reps. .. X 25 reps. .. Supine clam shells x 25 reps with black band around knees. X 25 reps. . X 25 reps. .. X. - -    X 10 reps. .. X 15 reps. . X 15 reps. . X 15 reps. . X 15 reps. X 15 reps. Sawyer Brannon PPT while hook lying with ball squeezing 5 second holds x 15 reps. .. - - - HEP.    HEP.  - HEP. - -      L HA while right side lying 2 x 15. Active KE l/r while sitting x 15 l/r. .. L HA while R side lying 3 x 5. X 15 each leg. 4 x 5 L HA while right side lying. With ankle pumps l/r. .. HEP. X 15 each leg. L HA while right side lying 4 x 5. X 20 l/r. Sawyer Brannon L HA while right side lying 2 x 10. A KE while sitting x 20 l/r. Sawyer Brannon L HA while right side lying 2 x 10. X 20 reps each leg. 2 x 12. A KE x 20 l/r. Sawyer Brannon L HA while right side lying 2 x 12. A KE x 20 each leg. L HA while right side lying 2 x 12. A KE x 20 reps each leg while sitting. 2 x 15. .      A KE x 20 reps each leg.          - Wide balance board a/p x 20.  - Wide balance board a/p x 20 reps. Sawyer Brannon A/P x 20. B SR using peach tubing while tall sitting 2  10. Wooden balance board a/p x 20. B SR with red tubing use 2 x 10 while tall sitting. A/P x 20. X 20 reps. . X 20. X 20 reps. Terence Sayres balance board a/p x 20. While standing B SR with red tubing 2 x 10. A/P x 20.       2 x 10 using peach tubing. Wooden balance board a/p x 20. Standing B SR using red tubing 2 x 10.    -   6\" :[facility] x 10 each leg with HHA prn. . 6\"[facility] x 3 reps each leg with light HHA prn. . 6\" [facility] x 12 reps each leg with HHA prn. . X 12 reps each leg. 6\" [facility] x 12 reps each leg with HHA prn. . X 12 reps each leg. X 12 reps each leg. 6\" dips x 20 L and x 18 R with HHA prn. .. 6\" dips with HHA prn:   L x 20. R x 20.  6\" dips with HHA prn:  R x 20. L x 20.   -      -    - R HA while standing in parallel bars with yellow band around ankle 2 x 8. Airex:   DL standing with B arm lifts x 10. Marching x 10 l/r. .. - R HA while standing with yellow band around ankle 2 x 10. Airex:    B arm lifts x 12 reps. .  Marching x 12 l/r. Ana Jessica 2 x 12. Airex:   B arm lifts x 15. Marching x 15 l/r. . Standing R HA with yellow band around knees 2 x 12. Airex:    B arm lifts x 15. Marching x 15 each leg. Standing R HA with yellow band around ankle 2 x 12. Airex:   B arm lifts x 15. Marching x 15 each leg. Forward tapping to a 6\" step x 10 each leg. 2 x 15 R HA with yellow band around ankles. Standing in parallel bars with HHA: big marching x 10 each leg. Standing on floor with side ways tapping to a 6\" step x 10 reps each leg. Shuttle DLS:   48# x 10. 31# x 10. 37# x 10 x 2.     R ELDER while standing with red band around ankle 2 x 10. Big marching x 12 l/r with HHA. .. Standing on floor tapping to a 6\" step x 10 each leg, forward and side ways without HHA. Shuttle DLS:    3 x 10. R HA while standing with red band around ankle 2 x 10. Big marching x 12 l/r. .    Standing on floor tapping to a 6\" step x 10 each leg each direction. Shuttle DLS 65# 3 x 12. Standing R HA with red band around ankle 2 x 12. Big marching while standing on the floor x 12 each leg with HHA. X 10 reps each leg, forward and side ways. HEP: PPT while hook lying, standing HA, supine clams with red band around knees, and supine piriformis stretch.      Charges: ex3      Total Timed Treatment: 42 min  Total Treatment Time: 42 min

## 2023-08-29 ENCOUNTER — APPOINTMENT (OUTPATIENT)
Dept: PHYSICAL THERAPY | Facility: HOSPITAL | Age: 73
End: 2023-08-29
Attending: FAMILY MEDICINE
Payer: MEDICARE

## 2023-08-31 ENCOUNTER — APPOINTMENT (OUTPATIENT)
Dept: PHYSICAL THERAPY | Facility: HOSPITAL | Age: 73
End: 2023-08-31
Attending: FAMILY MEDICINE
Payer: MEDICARE

## 2023-09-21 ENCOUNTER — OFFICE VISIT (OUTPATIENT)
Dept: PHYSICAL THERAPY | Facility: HOSPITAL | Age: 73
End: 2023-09-21
Attending: FAMILY MEDICINE
Payer: MEDICARE

## 2023-09-21 PROCEDURE — 97110 THERAPEUTIC EXERCISES: CPT

## 2023-09-21 NOTE — PROGRESS NOTES
Diagnosis:   Associated DX:  Piriformis syndrome of right side (G57.01)    Referring Provider: No ref. provider found  Date of Evaluation:    4/26/23    Precautions:  None Next MD visit:   October 2023 with surgeon. Date of Surgery: May 2022. Insurance Primary/Secondary: MEDICARE / [address] [place] [address]     # Auth Visits: 10 additional rxs. .           Subjective:  R low back aching pain is rated 5/10 when walking into the clinic this afternoon. 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 .. Stairs are being negotiated reciprocally, \"slow but ok\". Notes being able to walk for a block. Report standing tolerance of at least 1 hour. Is reportedly sleeping fine. States that she bought some exercise equipment for home use and plans on transitioning back to the fitness center when PT is done. Pain:  5/10. Objective:   Sit to standing with some UE assist.    %O2 saturation was 98 and HR was 115 at the end of NuStep use. Assessment:  Gradually improving functional endurance. Goals:  (to be met in 10 visits)   Sit to/from standing without UE assist and no pain. Progress. Up/down a flight of stairs reciprocally with railing use prn. .   Met. Pt will report improved tolerance to house work. Progress. Pt report standing tolerance of at least 30 minutes with her ADLs. Met. Pt will be independent with an upgraded HEP. Ongoing, being met. Plan: Will re-assess at next visit for probable discharge. Treatment will continue to include:  LE/core strengthening, education, and a functional progression as tolerated and as appropriate. 6/14/23  #8/10 7/6/23  #9/10 7/10/23  #10/10 7/12/23  #1 add'l... 8/1/23  #2 add'l... 8/4/23  #3 add'l rxs. .. 8/7/23  #4 add'l rx. ... 8/9/23  #5 8/14/23  #6 8/16/23  #7 8/21/23  #8 9/21/23  #9    Load 5 for 6 minutes. Load 6 for 6 minutes. NuStep (9/9) load 6 for 6 minutes. Load 6 for 6 minutes  NuStep (9/9) load 6 for 6 minutes.   NuStep load 6 for 7 minutes. Load 6 for 8 minutes. Load 6 for 7 minutes. NuStep load 6 for 7 minutes. Load 6 for 7 minutes. Load 6 for 7 minutes. Load 6 for 7 minutes. DKTC with heels on SB 2 x 10. X 20. X 20. DKTC with heels on SB x 25 reps. . X 25 reps. . X 20. DKTC with heels on SB x 25 reps. DKTC with heels on SB x 25 reps. .. X 20. X 20 reps. .  DKTC with heels on SB x 20 reps. . X 20 reps. Trisha Jeffery LTR with calves on SB x 20 l/r. .. X 20 l/r. . X 20 l/r. Trisha Jeffery LTR with calves on SB x 20 l/r. . X 20 l/r. .. X 20 l/r. Trisha Jeffery LTR with calves on SB x 20 l/r. LTR with calves on SB x 20 l/r. X 20 l/r. . X 20 l/r. Trisha Jeffery LTR with calves on SB x 20 l/r. Trisha Jeffery LTR with calves on SB x 20 l/r. Short range bridging with calves on SB x 10 reps. . 2 x 8.   2 x 8.  2 x 8.  2 x 10.  2 x 10.  2 x 10.  2 x 10. - -  -    Supine l/r piriformis stretching by PT with SB use for 1 minute each leg. For 1 minute each leg by PT. For 1 minute each leg by PT. X. For 1 minute each leg. HEP.  HEP.  HEP. - -  -    X 25 reps each leg X 25 reps. . X 25 reps. .. X 25 reps. .. Supine clam shells x 25 reps with black band around knees. X 25 reps. . X 25 reps. .. X. - -  Hook lying clams with black band around knees x 25 reps. .    X 10 reps. .. X 15 reps. . X 15 reps. . X 15 reps. . X 15 reps. X 15 reps. Trisha Jeffery PPT while hook lying with ball squeezing 5 second holds x 15 reps. .. - - - HEP. X 10 reps. .    HEP.  - HEP. - -      L HA while right side lying 2 x 15. L HA while right side lying 2 x 15. Active KE l/r while sitting x 15 l/r. .. L HA while R side lying 3 x 5. X 15 each leg. 4 x 5 L HA while right side lying. With ankle pumps l/r. .. HEP. X 15 each leg. L HA while right side lying 4 x 5. X 20 l/r. Trisha Jeffery L ELDER while right side lying 2 x 10. A KE while sitting x 20 l/r. Trisha Jeffery L ELDER while right side lying 2 x 10. X 20 reps each leg. 2 x 12. A KE x 20 l/r. Trisha Jeffery L ELDER while right side lying 2 x 12. A KE x 20 each leg.        L ELDER while right side lying 2 x 12. A KE x 20 reps each leg while sitting. 2 x 15. .      A KE x 20 reps each leg. X 20 each leg.     - Wide balance board a/p x 20.  - Wide balance board a/p x 20 reps. . A/P x 20. B SR using peach tubing while tall sitting 2  10. Wooden balance board a/p x 20. B SR with red tubing use 2 x 10 while tall sitting. A/P x 20. X 20 reps. . X 20. X 20 reps. Devota Roulette balance board a/p x 20. While standing B SR with red tubing 2 x 10. A/P x 20.       2 x 10 using peach tubing. Wooden balance board a/p x 20. Standing B SR using red tubing 2 x 10. Wooden balance board a/p x 20.         -   6\" :[facility] x 10 each leg with HHA prn. . 6\"[facility] x 3 reps each leg with light HHA prn. . 6\" [facility] x 12 reps each leg with HHA prn. . X 12 reps each leg. 6\" [facility] x 12 reps each leg with HHA prn. . X 12 reps each leg. X 12 reps each leg. 6\" dips x 20 L and x 18 R with HHA prn. .. 6\" dips with HHA prn:   L x 20. R x 20.  6\" dips with HHA prn:  R x 20. L x 20. Standing B SR using red tubing x 20 reps. .    -      -    - R HA while standing in parallel bars with yellow band around ankle 2 x 8. Airex:   DL standing with B arm lifts x 10. Marching x 10 l/r. .. - R HA while standing with yellow band around ankle 2 x 10. Airex:    B arm lifts x 12 reps. .  Marching x 12 l/r. Rmau Coke 2 x 12. Airex:   B arm lifts x 15. Marching x 15 l/r. . Standing R HA with yellow band around knees 2 x 12. Airex:    B arm lifts x 15. Marching x 15 each leg. Standing R HA with yellow band around ankle 2 x 12. Airex:   B arm lifts x 15. Marching x 15 each leg. Forward tapping to a 6\" step x 10 each leg. 2 x 15 R HA with yellow band around ankles. Standing in parallel bars with HHA: big marching x 10 each leg. Standing on floor with side ways tapping to a 6\" step x 10 reps each leg. Shuttle DLS:   82# x 10. 31# x 10.    37# x 10 x 2.     R ELDER while standing with red band around ankle 2 x 10. Big marching x 12 l/r with HHA. .. Standing on floor tapping to a 6\" step x 10 each leg, forward and side ways without HHA. Shuttle DLS:    3 x 10. R HA while standing with red band around ankle 2 x 10. Big marching x 12 l/r. .    Standing on floor tapping to a 6\" step x 10 each leg each direction. Shuttle DLS 24# 3 x 12. Standing R HA with red band around ankle 2 x 12. Big marching while standing on the floor x 12 each leg with HHA. X 10 reps each leg, forward and side ways. Shuttle DLS 99# 3 x 12. Standing R HA with red band around ankle 2 x 12. X 12 each leg. X 10 each leg each direction. HEP: PPT while hook lying, standing HA, supine clams with red band around knees, and supine piriformis stretch.      Charges: ex3      Total Timed Treatment: 43 min  Total Treatment Time: 43 min

## 2023-09-25 DIAGNOSIS — I10 ESSENTIAL HYPERTENSION, BENIGN: ICD-10-CM

## 2023-09-25 RX ORDER — AMLODIPINE AND OLMESARTAN MEDOXOMIL 5; 40 MG/1; MG/1
1 TABLET ORAL DAILY
Qty: 90 TABLET | Refills: 1 | Status: SHIPPED | OUTPATIENT
Start: 2023-09-25

## 2023-09-26 ENCOUNTER — OFFICE VISIT (OUTPATIENT)
Dept: INTERNAL MEDICINE CLINIC | Facility: CLINIC | Age: 73
End: 2023-09-26
Payer: MEDICARE

## 2023-09-26 ENCOUNTER — OFFICE VISIT (OUTPATIENT)
Dept: PHYSICAL THERAPY | Facility: HOSPITAL | Age: 73
End: 2023-09-26
Attending: FAMILY MEDICINE
Payer: MEDICARE

## 2023-09-26 VITALS
RESPIRATION RATE: 16 BRPM | DIASTOLIC BLOOD PRESSURE: 62 MMHG | SYSTOLIC BLOOD PRESSURE: 112 MMHG | HEIGHT: 65 IN | WEIGHT: 188 LBS | BODY MASS INDEX: 31.32 KG/M2 | HEART RATE: 84 BPM

## 2023-09-26 DIAGNOSIS — E55.9 VITAMIN D DEFICIENCY: ICD-10-CM

## 2023-09-26 DIAGNOSIS — E66.9 OBESITY (BMI 30.0-34.9): ICD-10-CM

## 2023-09-26 DIAGNOSIS — Z51.81 ENCOUNTER FOR THERAPEUTIC DRUG MONITORING: Primary | ICD-10-CM

## 2023-09-26 DIAGNOSIS — G89.29 CHRONIC MIDLINE LOW BACK PAIN WITHOUT SCIATICA: ICD-10-CM

## 2023-09-26 DIAGNOSIS — E53.8 VITAMIN B12 DEFICIENCY: ICD-10-CM

## 2023-09-26 DIAGNOSIS — I10 ESSENTIAL HYPERTENSION, BENIGN: ICD-10-CM

## 2023-09-26 DIAGNOSIS — M54.50 CHRONIC MIDLINE LOW BACK PAIN WITHOUT SCIATICA: ICD-10-CM

## 2023-09-26 PROCEDURE — 97110 THERAPEUTIC EXERCISES: CPT

## 2023-09-26 PROCEDURE — 99213 OFFICE O/P EST LOW 20 MIN: CPT | Performed by: NURSE PRACTITIONER

## 2023-09-26 NOTE — PROGRESS NOTES
Diagnosis:   Associated DX:  Piriformis syndrome of right side (G57.01)    Referring Provider: No ref. provider found  Date of Evaluation:    4/26/23    Precautions:  None Next MD visit:   October 2023 with surgeon. Date of Surgery: May 2022. Insurance Primary/Secondary: MEDICARE / [facility]     # Auth Visits: 10 additional rxs. Jack Shemar Discharge Summary  Pt has attended 20 visits in Physical Therapy. Subjective:  R low back aching pain is rated 6/10 when walking into the clinic this afternoon. Notes taking 600mg of Advil prn. .. Stairs are being negotiated reciprocally, \"really good\". Notes being able to walk for 2 blocks. Report standing tolerance of at least 1 hour. Is reportedly sleeping fine. Showering and dressing is fine. Walking around campus is going well. Notes having more energy overall. States that she bought some exercise equipment for home use and plans on transitioning back to the fitness center when PT is done. Objective:   Sit to standing without UE assist.   Gait is at an improved pace, longer stride lengths.   %O2 saturation was 97 and HR was 120 at the end of NuStep use. 6\" step-ups with mild UE assist and good control. LE myotomal strength is 5/5. Hip ER and hip abduction strength have improved but are not wnls. .   SL standing on the floor:  R x 13 seconds. L x 4 seconds. Assessment/Plan:  Gradually improving functional endurance and LE strength. Recommend discharge from PT with a HEP and good knowledge about self care for her condition. Goals:  (to be met in 10 visits)   Sit to/from standing without UE assist and no pain. Met. Up/down a flight of stairs reciprocally with railing use prn. .   Met. Pt will report improved tolerance to house work. Met. Pt report standing tolerance of at least 30 minutes with her ADLs. Met. Pt will be independent with an upgraded HEP.    Met.        6/14/23  #8/10 7/6/23  #9/10 7/10/23  #10/10 7/12/23  #1 add'l... 8/1/23  #2 add'l... 8/4/23  #3 add'l rxs. .. 8/7/23  #4 add'l rx. ... 8/9/23  #5 8/14/23  #6 8/16/23  #7 8/21/23  #8 9/21/23  #9 9/26/23  #10 add'l... Load 5 for 6 minutes. Load 6 for 6 minutes. NuStep (9/9) load 6 for 6 minutes. Load 6 for 6 minutes  NuStep (9/9) load 6 for 6 minutes. NuStep load 6 for 7 minutes. Load 6 for 8 minutes. Load 6 for 7 minutes. NuStep load 6 for 7 minutes. Load 6 for 7 minutes. Load 6 for 7 minutes. Load 6 for 7 minutes. NuStep (9/9) load 6 for 7 minutes. DKTC with heels on SB 2 x 10. X 20. X 20. DKTC with heels on SB x 25 reps. . X 25 reps. . X 20. DKTC with heels on SB x 25 reps. DKTC with heels on SB x 25 reps. .. X 20. X 20 reps. .  DKTC with heels on SB x 20 reps. . X 20 reps. . X 20 reps. [place] Row LTR with calves on SB x 20 l/r. .. X 20 l/r. . X 20 l/r. Yogesh Row LTR with calves on SB x 20 l/r. . X 20 l/r. .. X 20 l/r. Yogesh Row LTR with calves on SB x 20 l/r. LTR with calves on SB x 20 l/r. X 20 l/r. . X 20 l/r. [place] Row LTR with calves on SB x 20 l/r. Yogesh Row LTR with calves on SB x 20 l/r. X 20 l/r. Yogesh Row Short range bridging with calves on SB x 10 reps. . 2 x 8.   2 x 8.  2 x 8.  2 x 10.  2 x 10.  2 x 10.  2 x 10. - -  - -   Supine l/r piriformis stretching by PT with SB use for 1 minute each leg. For 1 minute each leg by PT. For 1 minute each leg by PT. X. For 1 minute each leg. HEP.  HEP.  HEP. - -  - -   X 25 reps each leg X 25 reps. . X 25 reps. .. X 25 reps. .. Supine clam shells x 25 reps with black band around knees. X 25 reps. . X 25 reps. .. X. - -  Hook lying clams with black band around knees x 25 reps. . X 25 reps. .   X 10 reps. .. X 15 reps. . X 15 reps. . X 15 reps. . X 15 reps. X 15 reps. [place] Row PPT while hook lying with ball squeezing 5 second holds x 15 reps. .. - - - HEP. X 10 reps. . X 10 reps. .   HEP.  - HEP. - -      L HA while right side lying 2 x 15. L HA while right side lying 2 x 15. L HA 2 x 15. Active KE l/r while sitting x 15 l/r. ..       L HA while R side lying 3 x 5. X 15 each leg. 4 x 5 L HA while right side lying. With ankle pumps l/r. .. HEP. X 15 each leg. L HA while right side lying 4 x 5. X 20 l/r. Jodean Cisco L HA while right side lying 2 x 10. A KE while sitting x 20 l/r. Jodean Isidoro L HA while right side lying 2 x 10. X 20 reps each leg. 2 x 12. A KE x 20 l/r. Jodean Cisco L HA while right side lying 2 x 12. A KE x 20 each leg. L HA while right side lying 2 x 12. A KE x 20 reps each leg while sitting. 2 x 15. .      A KE x 20 reps each leg. X 20 each leg. HEP. - Wide balance board a/p x 20.  - Wide balance board a/p x 20 reps. . A/P x 20. B SR using peach tubing while tall sitting 2  10. Wooden balance board a/p x 20. B SR with red tubing use 2 x 10 while tall sitting. A/P x 20. X 20 reps. . X 20. X 20 reps. [place]  balance board a/p x 20. While standing B SR with red tubing 2 x 10. A/P x 20.       2 x 10 using peach tubing. Wooden balance board a/p x 20. Standing B SR using red tubing 2 x 10. Wooden balance board a/p x 20. A/P x 20.    -   6\" :[facility] x 10 each leg with HHA prn. . 6\"[facility] x 3 reps each leg with light HHA prn. . 6\" [facility] x 12 reps each leg with HHA prn. . X 12 reps each leg. 6\" [facility] x 12 reps each leg with HHA prn. . X 12 reps each leg. X 12 reps each leg. 6\" dips x 20 L and x 18 R with HHA prn. .. 6\" dips with HHA prn:   L x 20. R x 20.  6\" dips with HHA prn:  R x 20. L x 20. Standing B SR using red tubing x 20 reps. . 6\" dips x 10 each leg.     -      -    - R HA while standing in parallel bars with yellow band around ankle 2 x 8. Airex:   DL standing with B arm lifts x 10. Marching x 10 l/r. .. - R HA while standing with yellow band around ankle 2 x 10. Airex:    B arm lifts x 12 reps. .  Marching x 12 l/r. Bubba Isidoro 2 x 12. Airex:   B arm lifts x 15. Marching x 15 l/r. . Standing R HA with yellow band around knees 2 x 12. Airex:    B arm lifts x 15. Marching x 15 each leg. Standing R HA with yellow band around ankle 2 x 12. Airex:   B arm lifts x 15. Marching x 15 each leg. Forward tapping to a 6\" step x 10 each leg. 2 x 15 R HA with yellow band around ankles. Standing in parallel bars with HHA: big marching x 10 each leg. Standing on floor with side ways tapping to a 6\" step x 10 reps each leg. Shuttle DLS:   70# x 10. 31# x 10. 37# x 10 x 2.     R ELDER while standing with red band around ankle 2 x 10. Big marching x 12 l/r with HHA. .. Standing on floor tapping to a 6\" step x 10 each leg, forward and side ways without HHA. Shuttle DLS:    3 x 10. R HA while standing with red band around ankle 2 x 10. Big marching x 12 l/r. .    Standing on floor tapping to a 6\" step x 10 each leg each direction. Shuttle DLS 83# 3 x 12. Standing R HA with red band around ankle 2 x 12. Big marching while standing on the floor x 12 each leg with HHA. X 10 reps each leg, forward and side ways. Shuttle DLS 46# 3 x 12. Standing R HA with red band around ankle 2 x 12. X 12 each leg. X 10 each leg each direction. Shuttle DLS 26# 3 x 12. Big marching x 10 l/r. .. HEP: PPT while hook lying, standing HA, supine clams with red band around knees, and supine piriformis stretch.      Charges: ex3      Total Timed Treatment: 40 min  Total Treatment Time: 40 min

## 2023-09-26 NOTE — PATIENT INSTRUCTIONS
Next steps:  1. Fill your prescribed medication and take as discussed and prescribed: ozempic 1mg weekly   2. Schedule a personal nutrition consultation with one of our registered dieticians    1. Drink water with meals and throughout the day, cut down on soda and/or juice if consumed. Consider flavored water options like Bubbly, Spindrift, Hint and Maryann. 2.  Eat breakfast daily and focus on having protein with each meal, examples include: greek yogurt, cottage cheese, hard boiled egg, whole grain toast with peanut butter. 3.  Reduce refined carbohydrates and sugars which includes items such as sweets, as well as rice, pasta, and bread and make sure to choose whole grain options when having them with just 1 serving per meal about the size of your inner palm. 4.  Consume non starchy veggies daily working towards making them a good 50% of your daily food intake. Add them to lunch and dinner consistently. 5.  Start a daily probiotic: VSL#3 is recommended, (order on line at www.vsl3. com). Take 1 capsule daily with water for 30 days, then reduce to 1 every other day (this will reduce the cost). Capsules can be left out for 2 weeks, but then must be refrigerated. Please download saskia My Fitness Juanell Coad! Or Net Diary to monitor daily dietary intake and you will be able to see if you are eating the right amount of calories or too much or too little which would hinder weight loss. Additionally this will help to see your daily carbohydrate and protein intake. When you set the saskia up choose 1-2 lbs/week as a goal.  Keeping a paper food journal is an option as well to remain accountable for your choices- this is the start to mindful eating! A low calorie diet has been consistently shown to support weight loss. Continue or start exercising to help establish a routine. If not already exercising begin with 1 day and progress as able with long-term goal of 30 minutes 5 days a week at a minimum.      Meditation daily can help manage and control stress. Chronic stress can make weight loss difficult. Exercising is one way to help with stress, but meditation using the CALM Clare or another comparable alternative can be done in your home or place of work with little time commitment. This Clare can also help work on behavior change and improve sleep. Check out the segment under Calm Masterclass and listen to The 4 Pillars of Health. A great way to begin learning about the foundation of lifestyle with practical tips to use in your every day. Check out www.yourweightmatters. org blog for continued daily support and education along this weight loss journey! Patient Resources:     Personal Training/Fitness Classes/Health Coaching     Mika Palmer and Arriaga Sophiaside @ [email or web address] Full fitness center with group fitness and personal training. Discount [facility]. Health Coaching and Personal Training with Franko Osullivan at our [facility]- individual weekly coaching with option to add personal training and small group fitness classes targeted at weight loss- [phone] and/or email @ Sebastien [email or web address]  360FIT [place] https://DynaPro Publishing Company.org/. Group Fitness [phone] and/or email Zenon Carmona at [email or web address]. Above All Software  [address] [facility] with multiple locations: Aetna (www.Overflow Cafe), Eat The Shopsy Fitness (www.1000memories. Above All Software), Fit Body Bootcamp (www.BugBusterbodyFireStar Softwarep.Above All Software), Godengo (www.eSnips), The Exercise  (www.exercisecoach.Above All Software)     Online Fitness  Fitness  on [employer] in 10 DVD series- www. ltgiv04TEG. Above All Software  Sit and Be Fit - Chair exercise series Www.sitandbefit. org  Hip Hop Fit with Dominic Martínez at www.hiphopfit. net     Apps for on the CaterCow 7 Minute Workout (orange box with white 7) - free on the go HIIT training clare  Peloton Clare @ www. Hitch     Nutrition Trackers and Tools  LoseIT! And My Fitness Pal apps and on line for tracking nutrition  NOOM - virtual health coaching  FitFoundation (healthy meals on the go) in [facility] @ www. fzymyefyezpds2v. John Simmons MD @ www.Novel Ingredient Servicesd.com and Ana M Dumont (keto and low carb plans recommended) @ www. EEITZP98.LZV, Metabolic Meals @ www. MyMetabolicMeals. com - individual prepared meals to go  Metis Secure Solutions, UNYQ, International Business Machines, Every Plate, Daylight Solutions- on line meal delivery programs for preparation at home  [place] [employer] in [place] for homemade meals to go @ wwwSwift Identity  Diet Doctor @ www. dietdoctor. APEPTICO [employer]orschung und Entwicklung - low carb swaps  Yummly - meal prep and planning clare (www.yummly. com)     Stress Management/Behavior/Mindful Eating  CALM meditation clare (www.Sun Catalytix)  Headspace  Am I Hungry? Mindful eating virtual  clare  Www.yourweightmatters. org - Obesity Action Coalition sponsored Blog posts daily  Motivation clare (black box with white \")- daily supportive messages sent to your phone     Books/Video Education/Podcasts  Mindless Eating by Gwyn Luciano  Why We Get Sick by Sourav Balbuena (a book about insulin resistance)  Atomic Habits by Candace Lemos (a book about taking small steps to promote greater behavior change)   Can't Hurt Me by Johan Barnett (a book exploring the power of discipline in achieving your goals)  The End of Dieting: How to Live for Life by Dr. Lucy Ford M.D. or listen to The [facility] Episode 61: Understanding \"Nutritarian\" Eating w/Dr. Lucy Ford  Your Body in Balance: The World Fuel Services Corporation of Food, Hormones, and Health by Dr. Dee Dee Thomas  The Menopause Diet Plan by Jenny Matamoros and [facility] AT [facility]  The Complete Guide to fasting by Dr. Fabi Victoria, [address] by Stevo Irwin, Ph.D, R.D.   Weight Loss Surgery Will Not Treat Food Addiction by Vincent Leger Ph.D  The Game Changers- NetSpringest Documentary on plant based nutrition  Fed Up - documentary about obesity (Free on Utube)  The Truth About Sugar - documentary on sugar (Free on Utube, https://youtu. be/0C8xoasPE0d)  The Dr. Valencia Erickson by Dr. Channing Morin MD  Fitlosophy Fitspiration - journal to better health (found at [employer] in fitness aisle)  What Happened to You?- a look at the impact trauma has on behavior written by [place] and Dr. Tiff Golden Again by Fady Henderson - discovering your true self after trauma  Lora Suarez talk on YASSSU, The Call to 2threads  Podcasts: The Exam Room by the Physician's Committee, Nutrition Facts by Dr. Still Favorite    We are here to support you with weight loss, but please remember that you still need your primary care provider for your routine health maintenance.

## 2023-10-27 ENCOUNTER — NURSE ONLY (OUTPATIENT)
Dept: FAMILY MEDICINE CLINIC | Facility: CLINIC | Age: 73
End: 2023-10-27

## 2023-10-27 DIAGNOSIS — K76.0 FATTY LIVER: ICD-10-CM

## 2023-10-27 DIAGNOSIS — E87.1 HYPONATREMIA: ICD-10-CM

## 2023-10-27 DIAGNOSIS — R63.4 WEIGHT LOSS: ICD-10-CM

## 2023-10-27 DIAGNOSIS — I10 ESSENTIAL HYPERTENSION, BENIGN: ICD-10-CM

## 2023-10-27 DIAGNOSIS — R73.01 ELEVATED FASTING GLUCOSE: ICD-10-CM

## 2023-10-27 LAB
ALBUMIN SERPL-MCNC: 3.6 G/DL (ref 3.4–5)
ALBUMIN/GLOB SERPL: 0.9 {RATIO} (ref 1–2)
ALP LIVER SERPL-CCNC: 90 U/L
ALT SERPL-CCNC: 24 U/L
ANION GAP SERPL CALC-SCNC: 4 MMOL/L (ref 0–18)
AST SERPL-CCNC: 23 U/L (ref 15–37)
BILIRUB SERPL-MCNC: 0.8 MG/DL (ref 0.1–2)
BUN BLD-MCNC: 12 MG/DL (ref 7–18)
CALCIUM BLD-MCNC: 9.4 MG/DL (ref 8.5–10.1)
CHLORIDE SERPL-SCNC: 102 MMOL/L (ref 98–112)
CO2 SERPL-SCNC: 28 MMOL/L (ref 21–32)
CREAT BLD-MCNC: 0.84 MG/DL
EGFRCR SERPLBLD CKD-EPI 2021: 73 ML/MIN/1.73M2 (ref 60–?)
EST. AVERAGE GLUCOSE BLD GHB EST-MCNC: 103 MG/DL (ref 68–126)
FASTING STATUS PATIENT QL REPORTED: YES
GLOBULIN PLAS-MCNC: 3.9 G/DL (ref 2.8–4.4)
GLUCOSE BLD-MCNC: 101 MG/DL (ref 70–99)
HBA1C MFR BLD: 5.2 % (ref ?–5.7)
OSMOLALITY SERPL CALC.SUM OF ELEC: 278 MOSM/KG (ref 275–295)
POTASSIUM SERPL-SCNC: 4.4 MMOL/L (ref 3.5–5.1)
PROT SERPL-MCNC: 7.5 G/DL (ref 6.4–8.2)
SODIUM SERPL-SCNC: 134 MMOL/L (ref 136–145)
TSI SER-ACNC: 1.14 MIU/ML (ref 0.36–3.74)

## 2023-10-27 PROCEDURE — 84443 ASSAY THYROID STIM HORMONE: CPT | Performed by: FAMILY MEDICINE

## 2023-10-27 PROCEDURE — 36415 COLL VENOUS BLD VENIPUNCTURE: CPT | Performed by: FAMILY MEDICINE

## 2023-10-27 PROCEDURE — 83036 HEMOGLOBIN GLYCOSYLATED A1C: CPT | Performed by: FAMILY MEDICINE

## 2023-10-27 PROCEDURE — 80053 COMPREHEN METABOLIC PANEL: CPT | Performed by: FAMILY MEDICINE

## 2023-12-15 ENCOUNTER — OFFICE VISIT (OUTPATIENT)
Dept: FAMILY MEDICINE CLINIC | Facility: CLINIC | Age: 73
End: 2023-12-15
Payer: MEDICARE

## 2023-12-15 VITALS
SYSTOLIC BLOOD PRESSURE: 126 MMHG | BODY MASS INDEX: 30.39 KG/M2 | TEMPERATURE: 98 F | DIASTOLIC BLOOD PRESSURE: 80 MMHG | HEIGHT: 65 IN | HEART RATE: 88 BPM | OXYGEN SATURATION: 98 % | RESPIRATION RATE: 18 BRPM | WEIGHT: 182.38 LBS

## 2023-12-15 DIAGNOSIS — M19.041 PRIMARY OSTEOARTHRITIS OF BOTH HANDS: ICD-10-CM

## 2023-12-15 DIAGNOSIS — I10 ESSENTIAL HYPERTENSION, BENIGN: ICD-10-CM

## 2023-12-15 DIAGNOSIS — R74.8 ELEVATED LIVER ENZYMES: ICD-10-CM

## 2023-12-15 DIAGNOSIS — M48.061 FORAMINAL STENOSIS OF LUMBAR REGION: ICD-10-CM

## 2023-12-15 DIAGNOSIS — M46.96 UNSPECIFIED INFLAMMATORY SPONDYLOPATHY, LUMBAR REGION (HCC): Primary | ICD-10-CM

## 2023-12-15 DIAGNOSIS — R73.03 PREDIABETES: ICD-10-CM

## 2023-12-15 DIAGNOSIS — K76.0 FATTY LIVER: ICD-10-CM

## 2023-12-15 DIAGNOSIS — M19.042 PRIMARY OSTEOARTHRITIS OF BOTH HANDS: ICD-10-CM

## 2023-12-15 DIAGNOSIS — L30.9 DERMATITIS: ICD-10-CM

## 2023-12-15 DIAGNOSIS — Z23 NEED FOR VACCINATION: ICD-10-CM

## 2023-12-15 LAB
CRP SERPL-MCNC: 0.33 MG/DL (ref ?–0.3)
ERYTHROCYTE [SEDIMENTATION RATE] IN BLOOD: 6 MM/HR
RHEUMATOID FACT SERPL-ACNC: 12 IU/ML (ref ?–15)
URATE SERPL-MCNC: 4.5 MG/DL

## 2023-12-15 PROCEDURE — 86431 RHEUMATOID FACTOR QUANT: CPT | Performed by: FAMILY MEDICINE

## 2023-12-15 PROCEDURE — 86225 DNA ANTIBODY NATIVE: CPT | Performed by: FAMILY MEDICINE

## 2023-12-15 PROCEDURE — 86038 ANTINUCLEAR ANTIBODIES: CPT | Performed by: FAMILY MEDICINE

## 2023-12-15 PROCEDURE — 86200 CCP ANTIBODY: CPT | Performed by: FAMILY MEDICINE

## 2023-12-15 PROCEDURE — G0009 ADMIN PNEUMOCOCCAL VACCINE: HCPCS | Performed by: FAMILY MEDICINE

## 2023-12-15 PROCEDURE — 85652 RBC SED RATE AUTOMATED: CPT | Performed by: FAMILY MEDICINE

## 2023-12-15 PROCEDURE — 90677 PCV20 VACCINE IM: CPT | Performed by: FAMILY MEDICINE

## 2023-12-15 PROCEDURE — 99214 OFFICE O/P EST MOD 30 MIN: CPT | Performed by: FAMILY MEDICINE

## 2023-12-15 PROCEDURE — 86140 C-REACTIVE PROTEIN: CPT | Performed by: FAMILY MEDICINE

## 2023-12-15 PROCEDURE — 84550 ASSAY OF BLOOD/URIC ACID: CPT | Performed by: FAMILY MEDICINE

## 2023-12-15 RX ORDER — NAPROXEN 500 MG/1
500 TABLET ORAL 2 TIMES DAILY PRN
Qty: 60 TABLET | Refills: 0 | Status: SHIPPED | OUTPATIENT
Start: 2023-12-15

## 2023-12-15 RX ORDER — AMOXICILLIN 500 MG/1
500 CAPSULE ORAL AS DIRECTED
COMMUNITY
Start: 2023-10-25

## 2023-12-15 RX ORDER — AMLODIPINE AND OLMESARTAN MEDOXOMIL 5; 40 MG/1; MG/1
1 TABLET ORAL DAILY
Qty: 90 TABLET | Refills: 0 | Status: SHIPPED | OUTPATIENT
Start: 2023-12-15

## 2023-12-15 NOTE — PROGRESS NOTES
Patient came in for draw of ordered fasting labs. Patient drawn out of right arm  AC, x 1 attempt and tolerated well.  2 SST ( gold) 1 SST ( green) 1 Lavender  tube drawn.

## 2023-12-18 LAB
CCP IGG SERPL-ACNC: 1.5 U/ML (ref 0–6.9)
DSDNA IGG SERPL IA-ACNC: 15.7 IU/ML
ENA AB SER QL IA: 0.1 UG/L
ENA AB SER QL IA: POSITIVE

## 2023-12-21 DIAGNOSIS — Z51.81 ENCOUNTER FOR THERAPEUTIC DRUG MONITORING: ICD-10-CM

## 2023-12-21 DIAGNOSIS — E66.9 OBESITY (BMI 30.0-34.9): ICD-10-CM

## 2023-12-21 DIAGNOSIS — I10 ESSENTIAL HYPERTENSION, BENIGN: ICD-10-CM

## 2023-12-21 NOTE — TELEPHONE ENCOUNTER
Requesting   Requested Prescriptions     Pending Prescriptions Disp Refills    semaglutide 4 MG/3ML Subcutaneous Solution Pen-injector 9 mL 0     Sig: Inject 1 mg into the skin once a week.      LOV: 9/26/23  RTC: 3 months  Filled: 9/26/23 #9 with 0 refills    Future Appointments   Date Time Provider Checo Rea   3/14/2024 11:00 AM LUISA Castillo EMGWEI [id number]   6/21/2024 10:30 AM You Xiong DO EMG 30 EMG Ewen

## 2023-12-28 DIAGNOSIS — M19.90 INFLAMMATORY ARTHRITIS: Primary | ICD-10-CM

## 2023-12-28 DIAGNOSIS — R76.8 DS DNA ANTIBODY POSITIVE: ICD-10-CM

## 2024-03-14 ENCOUNTER — OFFICE VISIT (OUTPATIENT)
Dept: INTERNAL MEDICINE CLINIC | Facility: CLINIC | Age: 74
End: 2024-03-14
Payer: MEDICARE

## 2024-03-14 VITALS
BODY MASS INDEX: 30.32 KG/M2 | RESPIRATION RATE: 16 BRPM | HEART RATE: 94 BPM | WEIGHT: 182 LBS | SYSTOLIC BLOOD PRESSURE: 110 MMHG | DIASTOLIC BLOOD PRESSURE: 70 MMHG | HEIGHT: 65 IN

## 2024-03-14 DIAGNOSIS — E55.9 VITAMIN D DEFICIENCY: ICD-10-CM

## 2024-03-14 DIAGNOSIS — E53.8 VITAMIN B12 DEFICIENCY: ICD-10-CM

## 2024-03-14 DIAGNOSIS — E66.9 OBESITY (BMI 30.0-34.9): ICD-10-CM

## 2024-03-14 DIAGNOSIS — G89.29 CHRONIC MIDLINE LOW BACK PAIN WITHOUT SCIATICA: ICD-10-CM

## 2024-03-14 DIAGNOSIS — M54.50 CHRONIC MIDLINE LOW BACK PAIN WITHOUT SCIATICA: ICD-10-CM

## 2024-03-14 DIAGNOSIS — I10 ESSENTIAL HYPERTENSION, BENIGN: ICD-10-CM

## 2024-03-14 DIAGNOSIS — Z51.81 ENCOUNTER FOR THERAPEUTIC DRUG MONITORING: Primary | ICD-10-CM

## 2024-03-14 DIAGNOSIS — I10 ESSENTIAL HYPERTENSION: ICD-10-CM

## 2024-03-14 PROCEDURE — 99214 OFFICE O/P EST MOD 30 MIN: CPT | Performed by: NURSE PRACTITIONER

## 2024-03-14 NOTE — PATIENT INSTRUCTIONS
Next steps:  1.  Fill your prescribed medication and take as discussed and prescribed: ozempic 1mg weekly   2.  Schedule a personal nutrition consultation with one of our registered dieticians  3. Try to get to aqua therapy      Please try to work on the following dietary changes:  Daily protein recommendation to start:  grams  Daily carbohydrate: <100g  Daily calories: 1,300  1.  Drink water with meals and throughout the day, cut down on soda and/or juice if consumed. Consider flavored water options like Bubbly, Spindrift, Hint and Maryann.  2.  Eat breakfast daily and focus on having protein with each meal, examples include: greek yogurt, cottage cheese, hard boiled egg, whole grain toast with peanut butter.   3.  Reduce refined carbohydrates and sugars which includes items such as sweets, as well as rice, pasta, and bread and make sure to choose whole grain options when having them with just 1 serving per meal about the size of your inner palm.  4.  Consume non starchy veggies daily working towards making them a good 50% of your daily food intake. Add them to lunch and dinner consistently.  5.  Start a daily probiotic: VSL#3 is recommended, (order on line at www.vsl3.com). Take 1 capsule daily with water for 30 days, then reduce to 1 every other day (this will reduce the cost). Capsules can be left out for 2 weeks, but then must be refrigerated.      Please download saskia My Fitness Pal, LoseIt! Or Net Diary to monitor daily dietary intake and you will be able to see if you are eating the right amount of calories or too much or too little which would hinder weight loss. Additionally this will help to see your daily carbohydrate and protein intake. When you set the saskia up choose 1-2 lbs/week as a goal.  Keeping a paper food journal is an option as well to remain accountable for your choices- this is the start to mindful eating! A low calorie diet has been consistently shown to support weight loss.     Continue  or start exercising to help establish a routine. If not already exercising begin with 1 day and progress as able with long-term goal of 30 minutes 5 days a week at a minimum.     Meditation daily can help manage and control stress. Chronic stress can make weight loss difficult.  Exercising is one way to help with stress, but meditation using the CALM Clare or another comparable alternative can be done in your home or place of work with little time commitment. This Clare can also help work on behavior change and improve sleep. Check out the segment under Calm Masterclass and listen to The 4 Pillars of Health. A great way to begin learning about the foundation of lifestyle with practical tips to use in your every day.     Check out www.yourweightmatters.org blog for continued daily support and education along this weight loss journey!    Patient Resources:     Personal Training/Fitness Classes/Health Coaching     [facility] @ [email or web address] Full fitness center with group fitness and personal training. Discount available as client of [facility].  Health Coaching and Personal Training with Adali Leo at our [facility]- individual weekly coaching with option to add personal training and small group fitness classes targeted at weight loss- [phone] and/or email @ [email or web address]  360FIT [place] [email or web address]. Group Fitness [phone] and/or email Nannette at [email or web address]  [facility]Rh[facility] Hospitaled Fitness Centers with multiple locations: MediBeacon (www.Revelation), Eat The Frog Fitness (www.MyTime.awe.sm), Fit Body Bootcamp (www.DSW Holdingsbodybootcamp.awe.sm), Innometrics Fitness (www.[place].awe.sm), The Exercise  (www.exercisecoach.awe.sm)     Online Fitness  Fitness  on Encubate Business Consulting  Fit in 10 DVD series- www.fosmm26ERE.com  Sit and Be Fit - Chair exercise series  Www.sitandbefit.org  Hip Hop Fit with Dominic Martínez at www.hiphopfit.net     Apps for on the Go Fitness  Powtoon 7 Minute Workout (orange box with white 7) - free on the go HIIT training clare  Peloton Clare @ www.onepeloton.com     Nutrition Trackers and Tools  LoseIT! And My Fitness Pal apps and on line for tracking nutrition  NOOM - virtual health coaching  FitFoundation (healthy meals on the go) in [place] @ www.ubpqqwtybxvza6jSiSense  Jaleel DOUGLAS @ wwwPixcbistromd.com and Uhpywn32 (keto and low carb plans recommended) @ www.ndsncw95.com, Metabolic Meals @ www.MyMetabolicMeals.com - individual prepared meals to go  Gobble, Blue Apron, Home , Every Plate, Sunbasket- on line meal delivery programs for preparation at home  [place] in [place] for homemade meals to go @ www.mealSeeTooage.Keepstream  Diet Doctor @ www.dietdoctor.Keepstream - low carb swaps  Yummly - meal prep and planning clare (www.yummly.com)     Stress Management/Behavior/Mindful Eating  CALM meditation clare (www.calm.com)  Headspace  Am I Hungry? Mindful eating virtual  clare  Www.yourweightmatters.org - Obesity Action Coalition sponsored Blog posts daily  Motivation clare (black box with white \")- daily supportive messages sent to your phone     Books/Video Education/Podcasts  Mindless Eating by Etienne Bae  Why We Get Sick by Sanket Esqueda (a book about insulin resistance)  Atomic Habits by Itz Lynch (a book about taking small steps to promote greater behavior change)   Can't Hurt Me by Ashvin Benitez (a book exploring the power of discipline in achieving your goals)  The End of Dieting: How to Live for Life by Dr. Doni Howard M.D. or listen to The Beam Networks Podcast Episode 63: Understanding \"Nutritarian\" Eating w/Dr. Doni Howard  Your Body in Balance: The New Science of Food, Hormones, and Health by Dr. Schuyler Bob  The Menopause Diet Plan by Vane Lau and Deloris Koroma  The Complete Guide to fasting by Dr. Chaudhry  Sugar, Salt & Fat by Karey  Nitish, Ph.D, R.D.  Weight Loss Surgery Will Not Treat Food Addiction by Lindsey Gee Ph.D  The Game Changers- Voxelix Documentary on plant based nutrition  Fed Up - documentary about obesity (Free on Utube)  The Truth About Sugar - documentary on sugar (Free on Utube, https://youtu.be/9V2clabQQ4j)  The Dr. Segundo T5 Wellness Plan by Dr. Gonzalo Segundo MD  Fitlosophy Fitspiration - journal to better health (found at [employer] in fitness aisle)  What Happened to You?- a look at the impact trauma has on behavior written by Amrik Herrera and Dr. Ulises Daly  Whole Again by Branden Jung - discovering your true self after trauma  Aurea Pinon talk on Infused Medical Technology, The Call to Courage  Podcasts: The Exam Room by the Physician's Committee, Nutrition Facts by Dr. Pagan    We are here to support you with weight loss, but please remember that you still need your primary care provider for your routine health maintenance.

## 2024-03-14 NOTE — PROGRESS NOTES
HISTORY OF PRESENT ILLNESS  Chief Complaint   Patient presents with    Weight Check     -6     Ciarra Sethi is a 73 year old female here for follow up with medical weight loss program for the treatment of overweight, obesity, or morbid obesity.     Down 6 lbs (f/u from 9/2023)   Compliant on ozempic 1mg weekly    Tolerating well, helping with decreasing appetite and no side effects     Success: Lost a few pounds since my last visit   Challenging:     None at this time    had heart surgery and has been in and out of the hospital for the past couple of months (high stress)   Cooking more at home  Had blood work done in 12/2023 with PCP and inflammation markers elevated- needs to see RA specialist   Exercise/Activity: 7x/ week, via walking, up and down stairs   Nutrition: eating regular meals, +protein, + veggies. not tracking reports  Meals out per week on average: 0  Stress is manageable   Sleep: 5 hours/night, waking up feeling rested    Denies chest pain, shortness of breath, dizziness, blurred vision, headache, paresthesia, nausea/vomiting.     Breakfast Lunch Dinner Snacks Fluids   Reviewed              Wt Readings from Last 6 Encounters:   03/14/24 182 lb (82.6 kg)   12/15/23 182 lb 6.4 oz (82.7 kg)   09/26/23 188 lb (85.3 kg)   08/03/23 189 lb (85.7 kg)   06/15/23 192 lb 12.8 oz (87.5 kg)   05/30/23 195 lb (88.5 kg)          REVIEW OF SYSTEMS  GENERAL: feels well otherwise, denied any fevers chills or night sweats   LUNGS: denies shortness of breath  CARDIOVASCULAR: denies chest pain  GI: denies abdominal pain  MUSCULOSKELETAL:  +back pain, +joint pains    PSYCH: denies change in behavior or mood, denies feeling sad or depressed    EXAM  /70   Pulse 94   Resp 16   Ht 5' 5\" (1.651 m)   Wt 182 lb (82.6 kg)   BMI 30.29 kg/m²       GENERAL: well developed, well nourished, in no apparent distress, A/O x3  SKIN: no rashes, no suspicious lesions  HEENT: atraumatic, normocephalic, OP-clear,  PERRLA  NECK: supple, no adenopathy  LUNGS: CTA in all fields, breathing non labored  CARDIO: RRR without murmur  GI: +BS, NT/ND, no masses or HSM  EXTREMITIES: no cyanosis, no clubbing, no edema    Lab Results   Component Value Date     (H) 10/27/2023    BUN 12 10/27/2023    BUNCREA 14.6 03/05/2021    CREATSERUM 0.84 10/27/2023    ANIONGAP 4 10/27/2023    GFRNAA 84 07/01/2022    GFRAA 97 07/01/2022    CA 9.4 10/27/2023    OSMOCALC 278 10/27/2023    ALKPHO 90 10/27/2023    AST 23 10/27/2023    ALT 24 10/27/2023    BILT 0.8 10/27/2023    TP 7.5 10/27/2023    ALB 3.6 10/27/2023    GLOBULIN 3.9 10/27/2023    AGRATIO 1.7 05/16/2013     (L) 10/27/2023    K 4.4 10/27/2023     10/27/2023    CO2 28.0 10/27/2023     Lab Results   Component Value Date     10/27/2023    A1C 5.2 10/27/2023     Lab Results   Component Value Date    CHOLEST 183.00 12/11/2019    TRIG 41.00 12/11/2019    HDL 71 12/11/2019     12/11/2019    VLDL 19 05/16/2013     Lab Results   Component Value Date    B12 427 06/04/2019     Lab Results   Component Value Date    VITD 34.22 09/25/2018       Current Outpatient Medications on File Prior to Visit   Medication Sig Dispense Refill    semaglutide 4 MG/3ML Subcutaneous Solution Pen-injector Inject 1 mg into the skin once a week. 9 mL 0    amoxicillin 500 MG Oral Cap Take 1 capsule (500 mg total) by mouth As Directed. ONE HOUR BEFORE PROCEDURE      hydrocortisone 2.5 % External Ointment Apply 1 Application topically 2 (two) times daily. 453.6 g 1    naproxen 500 MG Oral Tab Take 1 tablet (500 mg total) by mouth 2 (two) times daily as needed (arthritis flare). Take with food and water. Stop and call if stomach pain or GI side effects 60 tablet 0    diclofenac 1 % External Gel Apply 2 g topically 4 (four) times daily. Buy otc 150 g 1    amLODIPine-Olmesartan 5-40 MG Oral Tab Take 1 tablet by mouth daily. 90 tablet 0     No current facility-administered medications on file prior  to visit.       ASSESSMENT/PLAN    ICD-10-CM    1. Encounter for therapeutic drug monitoring  Z51.81       2. Obesity (BMI 30.0-34.9)  E66.9       3. Essential hypertension, benign  I10       4. Vitamin B12 deficiency  E53.8       5. Chronic midline low back pain without sciatica  M54.50     G89.29       6. Vitamin D deficiency  E55.9       7. Essential hypertension  I10           PLAN   Initial Weight Data and Goal Weight Loss:  Initial consult: #198 lbs on 12/2018  Weight Calculations  Initial Weight: 198 lbs  Initial Weight Date: 12/01/18  Today's Weight: 182 lbs  5% Goal: 9.9  10% Goal: 19.8  Total Weight Loss: 16 lbs  Total weight loss: Down 6 lbs total, Net loss 16 lbs  Continue with medications: ozempic 1mg. Used off label for weight loss (since wegovy is not covered)     --advised of side effects and adverse effects of this medication  Contradictions: has done plenity in the past, has done lomaria, topamax, and stopped contrave (due to nausea)    Reviewed labs   Continue with vitamin d OTC  HTN  blood pressure is in office is stable - continue present management  Chronic back pain (is going to [facility])   Stress is high, see HPI  Wrote out macros and encouraged tracking food  Nutrition: Low carb diet, recommended to eat breakfast daily/ regular protein intake  Follow up with dietitian and psychologist as recommended.  Discussed the role of sleep and stress in weight management.  Counseled on comprehensive weight loss plan including attention to nutrition, exercise and behavior/stress management for success. See patient instruction below for more details.  Discussed strategies to overcome barriers to successful weight loss and weight maintenance  FITTE: ACSM recommendations (150-300 minutes/ week in active weight loss)   Weight Loss Consent to treat reviewed and signed.    Total time spent on chart review, pre-charting, obtaining history, counseling, and educating, reviewing labs  was 30 minutes.       NOTE TO PATIENT: The 21st Century Cures Act makes clinical notes like these available to patients in the interest of transparency. Clinical notes are medical documents used by physicians and care providers to communicate with each other. These documents include medical language and terminology, abbreviations, and treatment information that may sound technical and at times possibly unfamiliar. In addition, at times, the verbiage may appear blunt or direct. These documents are one tool providers use to communicate relevant information and clinical opinions of the care providers in a way that allows common understanding of the clinical context.     There are no Patient Instructions on file for this visit.    No follow-ups on file.    Patient verbalizes understanding.    Dee Farah, APRN

## 2024-03-29 ENCOUNTER — PATIENT MESSAGE (OUTPATIENT)
Dept: INTERNAL MEDICINE CLINIC | Facility: CLINIC | Age: 74
End: 2024-03-29

## 2024-03-29 DIAGNOSIS — E66.9 OBESITY (BMI 30.0-34.9): ICD-10-CM

## 2024-03-29 DIAGNOSIS — I10 ESSENTIAL HYPERTENSION, BENIGN: ICD-10-CM

## 2024-03-29 DIAGNOSIS — Z51.81 ENCOUNTER FOR THERAPEUTIC DRUG MONITORING: ICD-10-CM

## 2024-03-29 RX ORDER — SEMAGLUTIDE 1.34 MG/ML
1 INJECTION, SOLUTION SUBCUTANEOUS WEEKLY
Qty: 9 ML | Refills: 0 | OUTPATIENT
Start: 2024-03-29

## 2024-04-01 NOTE — TELEPHONE ENCOUNTER
From: Ciarra Sethi  To: Dee Farah  Sent: 3/29/2024 12:57 PM CDT  Subject: Ozempic    Hi Dee Ramirez,  By any chance did your office receive a fax from [facility] to authorize my Ozempic? I have had several issues trying to refill the prescription. Originally the cost was $3,200 3 months ($3,200). I called my provider and they advised the cost was $550.00 and that [facility] made an error. I made numerous calls to [facility] over the past two weeks and I was just advised that they did fax a form to your office but that your office needs to contact insurance for approval. I never experienced this type of delay and I'm wondering if this is correct.     Would you please advise if I need to contact someone with the insurance? If not, can we switch the prescription over to [facility] on [address]?     Prabhakar,  Ciarra

## 2024-04-11 ENCOUNTER — MED REC SCAN ONLY (OUTPATIENT)
Dept: FAMILY MEDICINE CLINIC | Facility: CLINIC | Age: 74
End: 2024-04-11

## 2024-04-11 ENCOUNTER — TELEPHONE (OUTPATIENT)
Dept: FAMILY MEDICINE CLINIC | Facility: CLINIC | Age: 74
End: 2024-04-11

## 2024-04-11 DIAGNOSIS — R92.30 BREAST DENSITY: Primary | ICD-10-CM

## 2024-04-11 DIAGNOSIS — R92.8 ABNORMAL MAMMOGRAM OF BOTH BREASTS: ICD-10-CM

## 2024-04-11 NOTE — TELEPHONE ENCOUNTER
LMOM to return call to the office. Provided pt office phone [phone] along with office hours.        We have received patient mammogram results, there is no mammographic evidence of malignancy.       Patient will need to completed breast ultrasound do to breast density.         Order pended   Route to provider for review       Update HM

## 2024-04-25 DIAGNOSIS — I10 ESSENTIAL HYPERTENSION, BENIGN: ICD-10-CM

## 2024-04-25 RX ORDER — AMLODIPINE AND OLMESARTAN MEDOXOMIL 5; 40 MG/1; MG/1
1 TABLET ORAL DAILY
Qty: 90 TABLET | Refills: 0 | Status: SHIPPED | OUTPATIENT
Start: 2024-04-25

## 2024-04-25 NOTE — TELEPHONE ENCOUNTER
Pt called, states she needs refills of her amlodipine-olmesartan sent to [facility] on [address], [place].

## 2024-04-25 NOTE — TELEPHONE ENCOUNTER
Refill Passed Protocol:     Pt requesting refill of   Requested Prescriptions     Pending Prescriptions Disp Refills    amLODIPine-Olmesartan 5-40 MG Oral Tab 90 tablet 0     Sig: Take 1 tablet by mouth daily.     Refill was approved and sent to pharmacy:     Last Time Medication was Filled:  12/15/2023    Last Office Visit with Provider: 12/15/2023    Appt. scheduled on 7/9/2024     Statement Selected

## 2024-05-08 ENCOUNTER — PATIENT MESSAGE (OUTPATIENT)
Dept: INTERNAL MEDICINE CLINIC | Facility: CLINIC | Age: 74
End: 2024-05-08

## 2024-05-09 NOTE — TELEPHONE ENCOUNTER
Requesting   Requested Prescriptions     Pending Prescriptions Disp Refills    CUSTOM MEDICATION 1 each 0     Sig: Semaglutide 0.25mg/cyanobalamin   Concentration: 1/ 0.5 mg/mL  Instructions: Inject 25 units/0.25ml into skin q weekly  Dispense: 1 mL vial       LOV: 03/14/2024  RTC: in about 3 months  Filled: 04/08/2024 #1 each with 0 refills    Future Appointments   Date Time Provider Department Center   7/9/2024 10:00 AM Yeni Beltran DO [facility] [place]   7/11/2024  1:40 PM Dee Farah APRN [id number]   7/23/2024 10:15 AM Slade Mcgarry MD [id number] [place]

## 2024-05-09 NOTE — TELEPHONE ENCOUNTER
From: Ciarra Sethi  To: Dee Farah  Sent: 5/8/2024 1:22 PM CDT  Subject: Semaglutide Renewal    Hi Dee Ramirez,  I would like to continue with the Semaglutide from [facility] for a couple of months.    Would you please send a renewal to the pharmacy for me?

## 2024-05-15 ENCOUNTER — OFFICE VISIT (OUTPATIENT)
Dept: INTERNAL MEDICINE CLINIC | Facility: CLINIC | Age: 74
End: 2024-05-15

## 2024-05-15 VITALS
BODY MASS INDEX: 31.32 KG/M2 | HEART RATE: 92 BPM | WEIGHT: 188 LBS | HEIGHT: 65 IN | RESPIRATION RATE: 16 BRPM | SYSTOLIC BLOOD PRESSURE: 118 MMHG | DIASTOLIC BLOOD PRESSURE: 62 MMHG

## 2024-05-15 DIAGNOSIS — E66.9 OBESITY (BMI 30.0-34.9): ICD-10-CM

## 2024-05-15 DIAGNOSIS — Z51.81 ENCOUNTER FOR THERAPEUTIC DRUG MONITORING: Primary | ICD-10-CM

## 2024-05-15 DIAGNOSIS — G89.29 CHRONIC MIDLINE LOW BACK PAIN WITHOUT SCIATICA: ICD-10-CM

## 2024-05-15 DIAGNOSIS — M54.50 CHRONIC MIDLINE LOW BACK PAIN WITHOUT SCIATICA: ICD-10-CM

## 2024-05-15 DIAGNOSIS — E53.8 VITAMIN B12 DEFICIENCY: ICD-10-CM

## 2024-05-15 DIAGNOSIS — I10 ESSENTIAL HYPERTENSION, BENIGN: ICD-10-CM

## 2024-05-15 DIAGNOSIS — I10 ESSENTIAL HYPERTENSION: ICD-10-CM

## 2024-05-15 DIAGNOSIS — E55.9 VITAMIN D DEFICIENCY: ICD-10-CM

## 2024-05-15 PROCEDURE — 99214 OFFICE O/P EST MOD 30 MIN: CPT | Performed by: NURSE PRACTITIONER

## 2024-05-15 NOTE — PROGRESS NOTES
HISTORY OF PRESENT ILLNESS  Chief Complaint   Patient presents with    Weight Check     +6       Ciarra Sethi is a 73 year old female here for follow up with medical weight loss program for the treatment of overweight, obesity, or morbid obesity.     Up #6 lbs  Compliant on compound semaglutide   Tolerating well, doesn't feel like it's helping with decreasing appetite and no side effects     Brought in TB syringe (from [facility] to show us how she does the compound injections)    Success: was losing weight while on ozempic  Challenging: advice for change in medication  Has some questions about contrave   Exercise/Activity: not doing anything routine as far as exercise  Nutrition: eating regular meals, +protein, minimal veggies. not tracking reports  Meals out per week on average: 1  Stress is manageable- 7/10   Sleep: 5 hours/night, waking up feeling rested    Denies chest pain, shortness of breath, dizziness, blurred vision, headache, paresthesia, nausea/vomiting.     Breakfast Lunch Dinner Snacks Fluids   Reviewed              Wt Readings from Last 6 Encounters:   05/15/24 188 lb (85.3 kg)   03/14/24 182 lb (82.6 kg)   12/15/23 182 lb 6.4 oz (82.7 kg)   09/26/23 188 lb (85.3 kg)   08/03/23 189 lb (85.7 kg)   06/15/23 192 lb 12.8 oz (87.5 kg)          REVIEW OF SYSTEMS  GENERAL: feels well otherwise, denied any fevers chills or night sweats   LUNGS: denies shortness of breath  CARDIOVASCULAR: denies chest pain  GI: denies abdominal pain  MUSCULOSKELETAL: +back pain, +joint pains   PSYCH: denies change in behavior or mood, denies feeling sad or depressed    EXAM  /62   Pulse 92   Resp 16   Ht 5' 5\" (1.651 m)   Wt 188 lb (85.3 kg)   BMI 31.28 kg/m²       GENERAL: well developed, well nourished, in no apparent distress, A/O x3  SKIN: no rashes, no suspicious lesions  HEENT: atraumatic, normocephalic, OP-clear, PERRLA  NECK: supple, no adenopathy  LUNGS: CTA in all fields, breathing non labored  CARDIO:  RRR without murmur  GI: +BS, NT/ND, no masses or HSM  EXTREMITIES: no cyanosis, no clubbing, no edema    Lab Results   Component Value Date     (H) 10/27/2023    BUN 12 10/27/2023    BUNCREA 14.6 03/05/2021    CREATSERUM 0.84 10/27/2023    ANIONGAP 4 10/27/2023    GFRNAA 84 07/01/2022    GFRAA 97 07/01/2022    CA 9.4 10/27/2023    OSMOCALC 278 10/27/2023    ALKPHO 90 10/27/2023    AST 23 10/27/2023    ALT 24 10/27/2023    BILT 0.8 10/27/2023    TP 7.5 10/27/2023    ALB 3.6 10/27/2023    GLOBULIN 3.9 10/27/2023    AGRATIO 1.7 05/16/2013     (L) 10/27/2023    K 4.4 10/27/2023     10/27/2023    CO2 28.0 10/27/2023     Lab Results   Component Value Date     10/27/2023    A1C 5.2 10/27/2023     Lab Results   Component Value Date    CHOLEST 183.00 12/11/2019    TRIG 41.00 12/11/2019    HDL 71 12/11/2019     12/11/2019    VLDL 19 05/16/2013     Lab Results   Component Value Date    B12 427 06/04/2019     Lab Results   Component Value Date    VITD 34.22 09/25/2018       Current Outpatient Medications on File Prior to Visit   Medication Sig Dispense Refill    amLODIPine-Olmesartan 5-40 MG Oral Tab Take 1 tablet by mouth daily. 90 tablet 0    CUSTOM MEDICATION Semaglutide 0.25mg/cyanobalamin   Concentration: 1/ 0.5 mg/mL  Instructions: Inject 25 units/0.25ml into skin q weekly  Dispense: 1 mL vial 1 each 0    semaglutide 4 MG/3ML Subcutaneous Solution Pen-injector Inject 1 mg into the skin once a week. 9 mL 0    amoxicillin 500 MG Oral Cap Take 1 capsule (500 mg total) by mouth As Directed. ONE HOUR BEFORE PROCEDURE      hydrocortisone 2.5 % External Ointment Apply 1 Application topically 2 (two) times daily. 453.6 g 1    naproxen 500 MG Oral Tab Take 1 tablet (500 mg total) by mouth 2 (two) times daily as needed (arthritis flare). Take with food and water. Stop and call if stomach pain or GI side effects 60 tablet 0    diclofenac 1 % External Gel Apply 2 g topically 4 (four) times daily. Buy  otc 150 g 1     No current facility-administered medications on file prior to visit.       ASSESSMENT/PLAN    ICD-10-CM    1. Encounter for therapeutic drug monitoring  Z51.81       2. Obesity (BMI 30.0-34.9)  E66.9       3. Essential hypertension, benign  I10       4. Vitamin B12 deficiency  E53.8       5. Chronic midline low back pain without sciatica  M54.50     G89.29       6. Vitamin D deficiency  E55.9       7. Essential hypertension  I10           PLAN   Initial Weight Data and Goal Weight Loss:  Initial consult: # 198lbs on 12/2018  Weight Calculations  Initial Weight: 198 lbs  Initial Weight Date: 12/01/18  Today's Weight: 188 lbs  5% Goal: 9.9  10% Goal: 19.8  Total Weight Loss: 10 lbs  Total weight loss: up #6 lbs total, Net loss 10 lbs  Continue with medications: Compound semaglutide 0.25mg weekly is a custom-made medication that mimics the GLP-1 hormone. It is used to treat type 2 diabetes and lower the risk of heart or blood vessel disease. It works by increasing insulin release, lowering glucagon release, delaying gastric emptying and reducing appetite. Compound semaglutide is prescribed when an FDA-approved medication, dose, or dosage form is unavailable (ie. Nationwide shortage or no obesity coverage for GLP-1 meds). Patients are aware of the difference between these medications.  (Was actually drawing up the medication incorrectly- showed how to do it correctly)  --advised of side effects and adverse effects of this medication  Contradictions: has done plenity in the past, has done lomaria, topamax, and stopped contrave (due to nausea)    Reviewed labs   Continue with vitamin d OTC  HTN  blood pressure is in office is stable - continue present management  Chronic back pain (is going to start doing [facility])   Stress is high, see HPI  Wrote out macros and encouraged tracking food  Nutrition: Low carb diet, recommended to eat breakfast daily/ regular protein intake  Follow up with  dietitian and psychologist as recommended.  Discussed the role of sleep and stress in weight management.  Counseled on comprehensive weight loss plan including attention to nutrition, exercise and behavior/stress management for success. See patient instruction below for more details.  Discussed strategies to overcome barriers to successful weight loss and weight maintenance  FITTE: ACSM recommendations (150-300 minutes/ week in active weight loss)   Weight Loss Consent to treat reviewed and signed.    Total time spent on chart review, pre-charting, obtaining history, counseling, and educating, reviewing labs was 30 minutes.       NOTE TO PATIENT: The 21st Century Cures Act makes clinical notes like these available to patients in the interest of transparency. Clinical notes are medical documents used by physicians and care providers to communicate with each other. These documents include medical language and terminology, abbreviations, and treatment information that may sound technical and at times possibly unfamiliar. In addition, at times, the verbiage may appear blunt or direct. These documents are one tool providers use to communicate relevant information and clinical opinions of the care providers in a way that allows common understanding of the clinical context.     There are no Patient Instructions on file for this visit.    No follow-ups on file.    Patient verbalizes understanding.    Dee Farah, APRN         Chart review , case discussion with ED provider/ consultants , examination of patient , answering questions and concerns , ordering labs and medications , and documentation

## 2024-05-15 NOTE — PATIENT INSTRUCTIONS
Next steps:  1.  Fill your prescribed medication and take as discussed and prescribed: compound semaglutide 0.25mg weekly   2.  Schedule a personal nutrition consultation with one of our registered dieticians     Please try to work on the following dietary changes:  Daily protein recommendation to start:  grams  Daily carbohydrate: <100g  Daily calories: 1,300  1.  Drink water with meals and throughout the day, cut down on soda and/or juice if consumed. Consider flavored water options like Bubbly, Spindrift, Hint and Maryann.  2.  Eat breakfast daily and focus on having protein with each meal, examples include: greek yogurt, cottage cheese, hard boiled egg, whole grain toast with peanut butter.   3.  Reduce refined carbohydrates and sugars which includes items such as sweets, as well as rice, pasta, and bread and make sure to choose whole grain options when having them with just 1 serving per meal about the size of your inner palm.  4.  Consume non starchy veggies daily working towards making them a good 50% of your daily food intake. Add them to lunch and dinner consistently.  5.  Start a daily probiotic: VSL#3 is recommended, (order on line at www.vsl3.com). Take 1 capsule daily with water for 30 days, then reduce to 1 every other day (this will reduce the cost). Capsules can be left out for 2 weeks, but then must be refrigerated.      Please download saskia My Fitness Pal, LoseIt! Or Net Diary to monitor daily dietary intake and you will be able to see if you are eating the right amount of calories or too much or too little which would hinder weight loss. Additionally this will help to see your daily carbohydrate and protein intake. When you set the s[place]ia up choose 1-2 lbs/week as a goal.  Keeping a paper food journal is an option as well to remain accountable for your choices- this is the start to mindful eating! A low calorie diet has been consistently shown to support weight loss.     Continue or start  exercising to help establish a routine. If not already exercising begin with 1 day and progress as able with long-term goal of 30 minutes 5 days a week at a minimum.     Meditation daily can help manage and control stress. Chronic stress can make weight loss difficult.  Exercising is one way to help with stress, but meditation using the CALM Clare or another comparable alternative can be done in your home or place of work with little time commitment. This Clare can also help work on behavior change and improve sleep. Check out the segment under Calm Masterclass and listen to The 4 Pillars of Health. A great way to begin learning about the foundation of lifestyle with practical tips to use in your every day.     Check out www.yourweightmatters.org blog for continued daily support and education along this weight loss journey!    Patient Resources:     Personal Training/Fitness Classes/Health Coaching     [facility] @ [email or web address] Full fitness center with group fitness and personal training. Discount available as client of [facility].  Health Coaching and Personal Training with Adali Leo at our [place] [facility]- individual weekly coaching with option to add personal training and small group fitness classes targeted at weight loss- [phone] and/or email @ [email or web address]  360FIT [place] [email or web address]. Group Fitness [phone] and/or email Nannette at [email or web address]  [facility] Fitness Centers with multiple locations: [employer] (www.Pendo Systems), Eat The Frog Fitness (www.LGL/LatinMedios.Aveso), Fit Body Bootcamp (www.ProVision CommunicationsbodybootRotech Healthcarep.Aveso), Cipio Fitness (www.PV Evolution Labs.Aveso), The Exercise  (www.exercisecoach.Aveso)     Online Fitness  Fitness  on Novus  Fit in 10 DVD series- www.jamlf99PKM.com  Sit and Be Fit - Chair exercise series  Www.sitandbefit.org  Hip Hop Fit with Dominic Martínez at www.hiphopfit.net     Apps for on the Go Fitness  [employer] 7 Minute Workout (orange box with white 7) - free on the go HIIT training clare  Peloton Clare @ www.onepeloton.com     Nutrition Trackers and Tools  LoseIT! And My Fitness Pal apps and on line for tracking nutrition  NOOM - virtual health coaching  FitFoundation (healthy meals on the go) in [place] @ www.ulovtamhyyfxx8eDesino  Jaleel DOUGLAS @ wwwMilk A Dealbistromd.com and Gfddnd43 (keto and low carb plans recommended) @ www.fvljch11.com, Metabolic Meals @ www.MyMetabolicMeals.com - individual prepared meals to go  Gobble, Blue Apron, Home , Every Plate, Sunbasket- on line meal delivery programs for preparation at home  Meal Village in [place] for homemade meals to go @ www.mealMyVerseage.[employer]  Diet Doctor @ www.dietdoctor.[employer] - low carb swaps  Yummly - meal prep and planning clare (www.yummly.com)     Stress Management/Behavior/Mindful Eating  CALM meditation clare (www.calm.com)  Headspace  Am I Hungry? Mindful eating virtual  clare  Www.yourweightmatters.org - Obesity Action Coalition sponsored Blog posts daily  Motivation clare (black box with white \")- daily supportive messages sent to your phone     Books/Video Education/Podcasts  Mindless Eating by Etienne Bae  Why We Get Sick by Snaket Esqueda (a book about insulin resistance)  Atomic Habits by Itz Lynch (a book about taking small steps to promote greater behavior change)   Can't Hurt Me by Ashvin Benitez (a book exploring the power of discipline in achieving your goals)  The End of Dieting: How to Live for Life by Dr. Doni Howard M.D. or listen to The [employer] Podcast Episode 63: Understanding \"Nutritarian\" Eating w/Dr. Doni Howard  Your Body in Balance: The New Science of Food, Hormones, and Health by Dr. Schuyler Bob  The Menopause Diet Plan by Vane Lau and Deloris Koroma  The Complete Guide to fasting by Dr. Chaudhry  Sugar, Salt & Fat by Karey  Nitish, Ph.D, R.D.  Weight Loss Surgery Will Not Treat Food Addiction by Lindsey Gee Ph.D  The Game Changers- Pergunterix Documentary on plant based nutrition  Fed Up - documentary about obesity (Free on Utube)  The Truth About Sugar - documentary on sugar (Free on Utube, https://youtu.be/9N8xhzpBY7m)  The Dr. Segundo T5 Wellness Plan by Dr. Gonzalo Segundo MD  Fitlosophy Fitspiration - journal to better health (found at [employer] in fitness aisle)  What Happened to You?- a look at the impact trauma has on behavior written by Amrik Herrera and Dr. Ulises Daly  Whole Again by Branden Jung - discovering your true self after trauma  Aurea Pinon talk on [employer], The Call to Courage  Podcasts: The Exam Room by the Physician's Committee, Nutrition Facts by Dr. Pagan    We are here to support you with weight loss, but please remember that you still need your primary care provider for your routine health maintenance.

## 2024-06-11 DIAGNOSIS — I10 ESSENTIAL HYPERTENSION, BENIGN: ICD-10-CM

## 2024-06-12 RX ORDER — AMLODIPINE AND OLMESARTAN MEDOXOMIL 5; 40 MG/1; MG/1
1 TABLET ORAL DAILY
Qty: 90 TABLET | Refills: 0 | OUTPATIENT
Start: 2024-06-12

## 2024-06-12 NOTE — TELEPHONE ENCOUNTER
Refill Passed Protocol:     Pt requesting refill of   Requested Prescriptions     Pending Prescriptions Disp Refills    AMLODIPINE-OLMESARTAN 5-40 MG Oral Tab [Pharmacy Med Name: AMLODIPINE/OLMES MEDOXOM 5-40MG T] 90 tablet 0     Sig: TAKE 1 TABLET BY MOUTH DAILY         Refill was approved and sent to pharmacy:     Last Time Medication was Filled:  04/25/2024 90 tabs     Last Office Visit with Provider: 12/15/2024    Appt. scheduled on 07/09/2024    Refill passed protocol but requested too soon

## 2024-06-25 ENCOUNTER — OFFICE VISIT (OUTPATIENT)
Dept: INTERNAL MEDICINE CLINIC | Facility: CLINIC | Age: 74
End: 2024-06-25

## 2024-06-25 VITALS
BODY MASS INDEX: 31.99 KG/M2 | HEART RATE: 90 BPM | SYSTOLIC BLOOD PRESSURE: 130 MMHG | RESPIRATION RATE: 16 BRPM | DIASTOLIC BLOOD PRESSURE: 72 MMHG | WEIGHT: 192 LBS | HEIGHT: 65 IN

## 2024-06-25 DIAGNOSIS — M54.50 CHRONIC MIDLINE LOW BACK PAIN WITHOUT SCIATICA: ICD-10-CM

## 2024-06-25 DIAGNOSIS — E55.9 VITAMIN D DEFICIENCY: ICD-10-CM

## 2024-06-25 DIAGNOSIS — I10 ESSENTIAL HYPERTENSION: ICD-10-CM

## 2024-06-25 DIAGNOSIS — E53.8 VITAMIN B12 DEFICIENCY: ICD-10-CM

## 2024-06-25 DIAGNOSIS — E66.9 OBESITY (BMI 30.0-34.9): ICD-10-CM

## 2024-06-25 DIAGNOSIS — G89.29 CHRONIC MIDLINE LOW BACK PAIN WITHOUT SCIATICA: ICD-10-CM

## 2024-06-25 DIAGNOSIS — Z51.81 ENCOUNTER FOR THERAPEUTIC DRUG MONITORING: Primary | ICD-10-CM

## 2024-06-25 DIAGNOSIS — I10 ESSENTIAL HYPERTENSION, BENIGN: ICD-10-CM

## 2024-06-25 PROCEDURE — 99213 OFFICE O/P EST LOW 20 MIN: CPT | Performed by: NURSE PRACTITIONER

## 2024-06-25 RX ORDER — SEMAGLUTIDE 1.34 MG/ML
1 INJECTION, SOLUTION SUBCUTANEOUS WEEKLY
Qty: 9 ML | Refills: 0 | Status: SHIPPED | OUTPATIENT
Start: 2024-06-25

## 2024-06-25 RX ORDER — SEMAGLUTIDE 0.68 MG/ML
0.25 INJECTION, SOLUTION SUBCUTANEOUS WEEKLY
Qty: 3 ML | Refills: 0 | COMMUNITY
Start: 2024-06-25

## 2024-06-25 NOTE — PATIENT INSTRUCTIONS
Next steps:  1.  Fill your prescribed medication and take as discussed and prescribed: stop compound   And restart on ozempic 0.25mg weekly x 2 weeks and then increase to 0.5mg weekly x 4 weeks and then 1mg   2.  Schedule a personal nutrition consultation with one of our registered dieticians     Please try to work on the following dietary changes:  Daily protein recommendation to start:  grams  Daily carbohydrate: <100g  Daily calories: 1,300  1.  Drink water with meals and throughout the day, cut down on soda and/or juice if consumed. Consider flavored water options like Bubbly, Spindrift, Hint and Maryann.  2.  Eat breakfast daily and focus on having protein with each meal, examples include: greek yogurt, cottage cheese, hard boiled egg, whole grain toast with peanut butter.   3.  Reduce refined carbohydrates and sugars which includes items such as sweets, as well as rice, pasta, and bread and make sure to choose whole grain options when having them with just 1 serving per meal about the size of your inner palm.  4.  Consume non starchy veggies daily working towards making them a good 50% of your daily food intake. Add them to lunch and dinner consistently.  5.  Start a daily probiotic: VSL#3 is recommended, (order on line at www.vsl3.com). Take 1 capsule daily with water for 30 days, then reduce to 1 every other day (this will reduce the cost). Capsules can be left out for 2 weeks, but then must be refrigerated.      Please download s[place]ia My Fitness Pal, LoseIt! Or Net Diary to monitor daily dietary intake and you will be able to see if you are eating the right amount of calories or too much or too little which would hinder weight loss. Additionally this will help to see your daily carbohydrate and protein intake. When you set the s[place]ia up choose 1-2 lbs/week as a goal.  Keeping a paper food journal is an option as well to remain accountable for your choices- this is the start to mindful eating! A low calorie  diet has been consistently shown to support weight loss.     Continue or start exercising to help establish a routine. If not already exercising begin with 1 day and progress as able with long-term goal of 30 minutes 5 days a week at a minimum.     Meditation daily can help manage and control stress. Chronic stress can make weight loss difficult.  Exercising is one way to help with stress, but meditation using the CALM Clare or another comparable alternative can be done in your home or place of work with little time commitment. This Clare can also help work on behavior change and improve sleep. Check out the segment under Calm Masterclass and listen to The 4 Pillars of Health. A great way to begin learning about the foundation of lifestyle with practical tips to use in your every day.     Check out www.yourweightmatters.org blog for continued daily support and education along this weight loss journey!    Patient Resources:     Personal Training/Fitness Classes/Health Coaching     [facility] @ [email or web address] Full fitness center with group fitness and personal training. Discount available as client of [facility].  Health Coaching and Personal Training with Adali Leo at our [facility]- individual weekly coaching with option to add personal training and small group fitness classes targeted at weight loss- [phone] and/or email @ [email or web address]  [email or web address] [email or web address] Group Fitness [phone] and/or email Nannette at [email or web address]  [facility] Fitness Centers with multiple locations: [place] [employer] (www.SUB ONE TECHNOLOGY), Eat The Frog Fitness (www.[employer] Products International), Fit Body Bootcamp (www.CircassiabodybootBattlefyp.Action Products International), Monumental Games Fitness (www.General Cybernetics.[employer] Products International), The Exercise  (www.exercisecoach.[employer] Products [employer])     Online Fitness  Fitness  on Utube  Fit in 10  DVD series- www.gksze93NNX.com  Sit and Be Fit - Chair exercise series Www.sitandbefit.org  Hip Hop Fit with Dominic Martínez at www.hiphopfit.net     Apps for on the Go Fitness  [place] 7 Minute Workout (orange box with white 7) - free on the go HIIT training clare  Peloton Clare @ www.onepeloton.com     Nutrition Trackers and Tools  LoseIT! And My Fitness Pal apps and on line for tracking nutrition  NOOM - virtual health coaching  FitFoundation (healthy meals on the go) in [place] @ www.[email or web address].[employer]  Bistro MD @ wwwSwoon Editionsbistromd.com and Ibdupl09 (keto and low carb plans recommended) @ wwwSwoon Editionswwibwg10.com, Metabolic Meals @ www.Genscript TechnologyMetabolicMeals.com - individual prepared meals to go  Gobble, Blue Apron, Home , Every Plate, Sunbasket- on line meal delivery programs for preparation at home  Meal Village in [place] for homemade meals to go @ www.mealActiveSecllage.[employer]  Diet Doctor @ www.dietdoctor.com - low carb swaps  Yummly - meal prep and planning clare (www.yummly.com)     Stress Management/Behavior/Mindful Eating  CALM meditation clare (www.calm.com)  Headspace  Am I Hungry? Mindful eating virtual  clare  Www.yourweightmatters.org - Obesity Action Coalition sponsored Blog posts daily  Motivation clare (black box with white \")- daily supportive messages sent to your phone     Books/Video Education/Podcasts  Mindless Eating by Etienne Bae  Why We Get Sick by Sanket Esqueda (a book about insulin resistance)  Atomic Habits by Itz Lynch (a book about taking small steps to promote greater behavior change)   Can't Hurt Me by Ashvin Benitez (a book exploring the power of discipline in achieving your goals)  The End of Dieting: How to Live for Life by Dr. Doni Howard M.D. or listen to The ForceManager Podcast Episode 63: Understanding \"Nutritarian\" Eating w/Dr. Doin Howard  Your Body in Balance: The New Science of Food, Hormones, and Health by Dr. Schuyler Bob  The Menopause Diet Plan by Vane Lau and Deloris Koroma  The  Complete Guide to fasting by Dr. Chaudhry  Sugar, Salt & Fat by Karey Talbert, Ph.D, R.D.  Weight Loss Surgery Will Not Treat Food Addiction by Lindsey Gee Ph.D  The Game Changers- Netflix Documentary on plant based nutrition  Fed Up - documentary about obesity (Free on Utube)  The Truth About Sugar - documentary on sugar (Free on Utube, https://youtu.be/4L9empsPI8i)  The Dr. Segundo T5 Wellness Plan by Dr. Gonzalo Segundo MD  Fitlosophy Fitspiration - journal to better health (found at [employer] in fitness aisle)  What Happened to You?- a look at the impact trauma has on behavior written by Amrik Herrera and Dr. Ulises Daly  Whole Again by Branden Jung - discovering your true self after trauma  Aurea Pinon talk on Netflix, The Call to Courage  Podcasts: The Exam Room by the Physician's Committee, Nutrition Facts by Dr. Pagan    We are here to support you with weight loss, but please remember that you still need your primary care provider for your routine health maintenance.

## 2024-06-25 NOTE — PROGRESS NOTES
HISTORY OF PRESENT ILLNESS  Chief Complaint   Patient presents with    Weight Check     +4     Ciarra Sethi is a 73 year old female here for follow up with medical weight loss program for the treatment of overweight, obesity, or morbid obesity.     Up #4 lbs  Compliant on compound semaglutide   Tolerating well, doesn't feel like it's helping with decreasing appetite and no side effects     Would ideally like to stop compound (doesn't like drawing it up and delay at [facility]), really felt like ozempic worked better (but insurance stopped covering it)   Exercise/Activity: not doing anything routine as far as exercise  Nutrition: eating regular meals, +protein, minimal veggies. not tracking reports  Meals out per week on average: 1  Stress is manageable   Sleep: 5 hours/night, waking up feeling rested    Denies chest pain, shortness of breath, dizziness, blurred vision, headache, paresthesia, nausea/vomiting.     Breakfast Lunch Dinner Snacks Fluids   Reviewed              Wt Readings from Last 6 Encounters:   06/25/24 192 lb (87.1 kg)   05/15/24 188 lb (85.3 kg)   03/14/24 182 lb (82.6 kg)   12/15/23 182 lb 6.4 oz (82.7 kg)   09/26/23 188 lb (85.3 kg)   08/03/23 189 lb (85.7 kg)          REVIEW OF SYSTEMS  GENERAL: feels well otherwise, denied any fevers chills or night sweats   LUNGS: denies shortness of breath  CARDIOVASCULAR: denies chest pain  GI: denies abdominal pain  MUSCULOSKELETAL: +back pain, +joint pains   PSYCH: denies change in behavior or mood, denies feeling sad or depressed    EXAM  /72   Pulse 90   Resp 16   Ht 5' 5\" (1.651 m)   Wt 192 lb (87.1 kg)   BMI 31.95 kg/m²       GENERAL: well developed, well nourished, in no apparent distress, A/O x3  SKIN: no rashes, no suspicious lesions  HEENT: atraumatic, normocephalic, OP-clear, PERRLA  NECK: supple, no adenopathy  LUNGS: CTA in all fields, breathing non labored  CARDIO: RRR without murmur  GI: +BS, NT/ND, no masses or  HSM  EXTREMITIES: no cyanosis, no clubbing, no edema    Lab Results   Component Value Date     (H) 10/27/2023    BUN 12 10/27/2023    BUNCREA 14.6 03/05/2021    CREATSERUM 0.84 10/27/2023    ANIONGAP 4 10/27/2023    GFRNAA 84 07/01/2022    GFRAA 97 07/01/2022    CA 9.4 10/27/2023    OSMOCALC 278 10/27/2023    ALKPHO 90 10/27/2023    AST 23 10/27/2023    ALT 24 10/27/2023    BILT 0.8 10/27/2023    TP 7.5 10/27/2023    ALB 3.6 10/27/2023    GLOBULIN 3.9 10/27/2023    AGRATIO 1.7 05/16/2013     (L) 10/27/2023    K 4.4 10/27/2023     10/27/2023    CO2 28.0 10/27/2023     Lab Results   Component Value Date     10/27/2023    A1C 5.2 10/27/2023     Lab Results   Component Value Date    CHOLEST 183.00 12/11/2019    TRIG 41.00 12/11/2019    HDL 71 12/11/2019     12/11/2019    VLDL 19 05/16/2013     Lab Results   Component Value Date    B12 427 06/04/2019     Lab Results   Component Value Date    VITD 34.22 09/25/2018       Current Outpatient Medications on File Prior to Visit   Medication Sig Dispense Refill    CUSTOM MEDICATION Semaglutide 0.25mg/cyanobalamin   Concentration: 1/ 0.5 mg/mL  Instructions: Inject 25 units/0.25ml into skin q weekly  Dispense: 1 mL vial 1 each 2    amLODIPine-Olmesartan 5-40 MG Oral Tab Take 1 tablet by mouth daily. 90 tablet 0    CUSTOM MEDICATION Semaglutide 0.25mg/cyanobalamin   Concentration: 1/ 0.5 mg/mL  Instructions: Inject 25 units/0.25ml into skin q weekly  Dispense: 1 mL vial 1 each 0    semaglutide 4 MG/3ML Subcutaneous Solution Pen-injector Inject 1 mg into the skin once a week. 9 mL 0    amoxicillin 500 MG Oral Cap Take 1 capsule (500 mg total) by mouth As Directed. ONE HOUR BEFORE PROCEDURE      hydrocortisone 2.5 % External Ointment Apply 1 Application topically 2 (two) times daily. 453.6 g 1    naproxen 500 MG Oral Tab Take 1 tablet (500 mg total) by mouth 2 (two) times daily as needed (arthritis flare). Take with food and water. Stop and call if  stomach pain or GI side effects 60 tablet 0    diclofenac 1 % External Gel Apply 2 g topically 4 (four) times daily. Buy otc 150 g 1     No current facility-administered medications on file prior to visit.       ASSESSMENT/PLAN    ICD-10-CM    1. Encounter for therapeutic drug monitoring  Z51.81       2. Obesity (BMI 30.0-34.9)  E66.9       3. Essential hypertension, benign  I10       4. Vitamin D deficiency  E55.9       5. Vitamin B12 deficiency  E53.8       6. Chronic midline low back pain without sciatica  M54.50     G89.29       7. Essential hypertension  I10           PLAN   Initial Weight Data and Goal Weight Loss:  Initial consult: # 198lbs on 12/2018  Weight Calculations  Initial Weight: 198 lbs  Initial Weight Date: 12/01/18  Today's Weight: 192 lbs  5% Goal: 9.9  10% Goal: 19.8  Total Weight Loss: 6 lbs  Total weight loss: up #4 lbs total, Net loss 6 lbs  Will stop medications: Compound semaglutide   Will go back to medication: ozempic (sample and 1 mg) to [facility]. Used for insulin resistance. Used off label since wegovy or zepbound are not covered by insurance.   --advised of side effects and adverse effects of this medication  Contradictions: has done plenity in the past, has done lomaria, topamax, and stopped contrave (due to nausea)    Reviewed labs   Continue with vitamin d OTC  HTN  blood pressure is in office is stable - continue present management  Chronic back pain (is going to start doing [facility]a [facility])   Stress is high, see HPI  Wrote out macros and encouraged tracking food  Nutrition: Low carb diet, recommended to eat breakfast daily/ regular protein intake  Follow up with dietitian and psychologist as recommended.  Discussed the role of sleep and stress in weight management.  Counseled on comprehensive weight loss plan including attention to nutrition, exercise and behavior/stress management for success. See patient instruction below for more details.  Discussed strategies  to overcome barriers to successful weight loss and weight maintenance  FITTE: ACSM recommendations (150-300 minutes/ week in active weight loss)   Weight Loss Consent to treat reviewed and signed.    Total time spent on chart review, pre-charting, obtaining history, counseling, and educating, reviewing labs was 28 minutes.       NOTE TO PATIENT: The 21st Century Cures Act makes clinical notes like these available to patients in the interest of transparency. Clinical notes are medical documents used by physicians and care providers to communicate with each other. These documents include medical language and terminology, abbreviations, and treatment information that may sound technical and at times possibly unfamiliar. In addition, at times, the verbiage may appear blunt or direct. These documents are one tool providers use to communicate relevant information and clinical opinions of the care providers in a way that allows common understanding of the clinical context.     There are no Patient Instructions on file for this visit.    No follow-ups on file.    Patient verbalizes understanding.    Dee Farah, APRN

## 2024-06-26 ENCOUNTER — TELEPHONE (OUTPATIENT)
Dept: INTERNAL MEDICINE CLINIC | Facility: CLINIC | Age: 74
End: 2024-06-26

## 2024-07-08 RX ORDER — BETAMETHASONE DIPROPIONATE 0.5 MG/G
CREAM TOPICAL
Qty: 45 G | Refills: 5 | Status: SHIPPED | OUTPATIENT
Start: 2024-07-08

## 2024-07-09 ENCOUNTER — OFFICE VISIT (OUTPATIENT)
Dept: FAMILY MEDICINE CLINIC | Facility: CLINIC | Age: 74
End: 2024-07-09
Payer: MEDICARE

## 2024-07-09 VITALS
WEIGHT: 193.19 LBS | BODY MASS INDEX: 33.81 KG/M2 | DIASTOLIC BLOOD PRESSURE: 76 MMHG | HEIGHT: 63.19 IN | TEMPERATURE: 97 F | HEART RATE: 68 BPM | OXYGEN SATURATION: 97 % | SYSTOLIC BLOOD PRESSURE: 131 MMHG | RESPIRATION RATE: 18 BRPM

## 2024-07-09 DIAGNOSIS — R74.8 ELEVATED LIVER ENZYMES: ICD-10-CM

## 2024-07-09 DIAGNOSIS — Z13.6 SCREENING, ISCHEMIC HEART DISEASE: ICD-10-CM

## 2024-07-09 DIAGNOSIS — M54.50 CHRONIC MIDLINE LOW BACK PAIN WITHOUT SCIATICA: ICD-10-CM

## 2024-07-09 DIAGNOSIS — R73.03 PREDIABETES: ICD-10-CM

## 2024-07-09 DIAGNOSIS — Z78.0 ASYMPTOMATIC MENOPAUSE: ICD-10-CM

## 2024-07-09 DIAGNOSIS — Z12.11 SCREEN FOR COLON CANCER: ICD-10-CM

## 2024-07-09 DIAGNOSIS — G89.29 CHRONIC MIDLINE LOW BACK PAIN WITHOUT SCIATICA: ICD-10-CM

## 2024-07-09 DIAGNOSIS — M19.042 PRIMARY OSTEOARTHRITIS OF BOTH HANDS: ICD-10-CM

## 2024-07-09 DIAGNOSIS — I10 ESSENTIAL HYPERTENSION, BENIGN: ICD-10-CM

## 2024-07-09 DIAGNOSIS — K76.0 FATTY LIVER: ICD-10-CM

## 2024-07-09 DIAGNOSIS — Z00.00 ENCOUNTER FOR ANNUAL HEALTH EXAMINATION: Primary | ICD-10-CM

## 2024-07-09 DIAGNOSIS — Z91.81 AT RISK FOR FALLS: ICD-10-CM

## 2024-07-09 DIAGNOSIS — R63.5 WEIGHT GAIN: ICD-10-CM

## 2024-07-09 DIAGNOSIS — Z12.31 ENCOUNTER FOR SCREENING MAMMOGRAM FOR MALIGNANT NEOPLASM OF BREAST: ICD-10-CM

## 2024-07-09 DIAGNOSIS — E87.1 HYPONATREMIA: ICD-10-CM

## 2024-07-09 DIAGNOSIS — M19.041 PRIMARY OSTEOARTHRITIS OF BOTH HANDS: ICD-10-CM

## 2024-07-09 PROBLEM — M46.96 UNSPECIFIED INFLAMMATORY SPONDYLOPATHY, LUMBAR REGION (HCC): Status: RESOLVED | Noted: 2023-12-15 | Resolved: 2024-07-09

## 2024-07-09 PROBLEM — R63.4 WEIGHT LOSS: Status: RESOLVED | Noted: 2023-06-15 | Resolved: 2024-07-09

## 2024-07-09 PROBLEM — M46.96 UNSPECIFIED INFLAMMATORY SPONDYLOPATHY, LUMBAR REGION: Status: RESOLVED | Noted: 2023-12-15 | Resolved: 2024-07-09

## 2024-07-09 LAB
ALBUMIN SERPL-MCNC: 4 G/DL (ref 3.4–5)
ALBUMIN/GLOB SERPL: 1.1 {RATIO} (ref 1–2)
ALP LIVER SERPL-CCNC: 105 U/L
ALT SERPL-CCNC: 24 U/L
ANION GAP SERPL CALC-SCNC: 6 MMOL/L (ref 0–18)
AST SERPL-CCNC: 25 U/L (ref 15–37)
BASOPHILS # BLD AUTO: 0.04 X10(3) UL (ref 0–0.2)
BASOPHILS NFR BLD AUTO: 0.7 %
BILIRUB SERPL-MCNC: 0.8 MG/DL (ref 0.1–2)
BUN BLD-MCNC: 12 MG/DL (ref 9–23)
CALCIUM BLD-MCNC: 9.6 MG/DL (ref 8.5–10.1)
CHLORIDE SERPL-SCNC: 100 MMOL/L (ref 98–112)
CO2 SERPL-SCNC: 27 MMOL/L (ref 21–32)
CREAT BLD-MCNC: 0.98 MG/DL
EGFRCR SERPLBLD CKD-EPI 2021: 61 ML/MIN/1.73M2 (ref 60–?)
EOSINOPHIL # BLD AUTO: 0.14 X10(3) UL (ref 0–0.7)
EOSINOPHIL NFR BLD AUTO: 2.4 %
ERYTHROCYTE [DISTWIDTH] IN BLOOD BY AUTOMATED COUNT: 14.6 %
EST. AVERAGE GLUCOSE BLD GHB EST-MCNC: 91 MG/DL (ref 68–126)
FASTING STATUS PATIENT QL REPORTED: NO
GLOBULIN PLAS-MCNC: 3.7 G/DL (ref 2.8–4.4)
GLUCOSE BLD-MCNC: 106 MG/DL (ref 70–99)
HBA1C MFR BLD: 4.8 % (ref ?–5.7)
HCT VFR BLD AUTO: 39.5 %
HGB BLD-MCNC: 13.8 G/DL
IMM GRANULOCYTES # BLD AUTO: 0.03 X10(3) UL (ref 0–1)
IMM GRANULOCYTES NFR BLD: 0.5 %
LYMPHOCYTES # BLD AUTO: 1.21 X10(3) UL (ref 1–4)
LYMPHOCYTES NFR BLD AUTO: 20.5 %
MCH RBC QN AUTO: 33.2 PG (ref 26–34)
MCHC RBC AUTO-ENTMCNC: 34.9 G/DL (ref 31–37)
MCV RBC AUTO: 95 FL
MONOCYTES # BLD AUTO: 0.55 X10(3) UL (ref 0.1–1)
MONOCYTES NFR BLD AUTO: 9.3 %
NEUTROPHILS # BLD AUTO: 3.92 X10 (3) UL (ref 1.5–7.7)
NEUTROPHILS # BLD AUTO: 3.92 X10(3) UL (ref 1.5–7.7)
NEUTROPHILS NFR BLD AUTO: 66.6 %
OSMOLALITY SERPL CALC.SUM OF ELEC: 276 MOSM/KG (ref 275–295)
PLATELET # BLD AUTO: 202 10(3)UL (ref 150–450)
POTASSIUM SERPL-SCNC: 4.4 MMOL/L (ref 3.5–5.1)
PROT SERPL-MCNC: 7.7 G/DL (ref 6.4–8.2)
RBC # BLD AUTO: 4.16 X10(6)UL
SODIUM SERPL-SCNC: 133 MMOL/L (ref 136–145)
TSI SER-ACNC: 1.45 MIU/ML (ref 0.36–3.74)
WBC # BLD AUTO: 5.9 X10(3) UL (ref 4–11)

## 2024-07-09 PROCEDURE — G0439 PPPS, SUBSEQ VISIT: HCPCS | Performed by: FAMILY MEDICINE

## 2024-07-09 PROCEDURE — 36415 COLL VENOUS BLD VENIPUNCTURE: CPT | Performed by: FAMILY MEDICINE

## 2024-07-09 PROCEDURE — 99214 OFFICE O/P EST MOD 30 MIN: CPT | Performed by: FAMILY MEDICINE

## 2024-07-09 PROCEDURE — 85025 COMPLETE CBC W/AUTO DIFF WBC: CPT | Performed by: FAMILY MEDICINE

## 2024-07-09 PROCEDURE — 84443 ASSAY THYROID STIM HORMONE: CPT | Performed by: FAMILY MEDICINE

## 2024-07-09 PROCEDURE — 80053 COMPREHEN METABOLIC PANEL: CPT | Performed by: FAMILY MEDICINE

## 2024-07-09 PROCEDURE — 83036 HEMOGLOBIN GLYCOSYLATED A1C: CPT | Performed by: FAMILY MEDICINE

## 2024-07-09 RX ORDER — AMLODIPINE AND OLMESARTAN MEDOXOMIL 5; 40 MG/1; MG/1
1 TABLET ORAL DAILY
Qty: 90 TABLET | Refills: 1 | Status: SHIPPED | OUTPATIENT
Start: 2024-07-09

## 2024-07-09 RX ORDER — NAPROXEN 500 MG/1
500 TABLET ORAL 2 TIMES DAILY PRN
Qty: 60 TABLET | Refills: 0 | Status: SHIPPED | OUTPATIENT
Start: 2024-07-09

## 2024-07-09 RX ORDER — BETAMETHASONE DIPROPIONATE 0.5 MG/G
1 CREAM TOPICAL 2 TIMES DAILY
COMMUNITY
Start: 2024-07-08 | End: 2024-07-22

## 2024-07-09 NOTE — PROGRESS NOTES
fallSubjective:   Ciarra Sethi is a 73 year old female who presents for a Subsequent Annual Wellness visit (Pt already had Initial Annual Wellness) and  addressed chronic conditions .     Had measles, mumps, rubella as child.     Patient presents for recheck of hypertension. Pt has been taking medications as instructed, . denies any medication side effects,. Patient is a  s Home blood pressures-130's over 70s to 80s.  Denies any headache, dizziness, chest pain, shortness of breath or leg swelling.     Prediabetes-not on medication.  Trying to watch diet and eat low-carb.  Denies polyuria, polydipsia or vision changes.  Gained 10 pounds back off of semaglutide  Last A1c:  HgbA1C (%)   Date Value   10/27/2023 5.2   01/11/2019 5.5      Renal mass-completed MRI on 3/29/2023 benign proteinaceous cyst.       Fatty liver-- Lost 30 pounds but gained 10 back since 4/2023 per patient.  Was on compounded semaglutide but did not like giving self injections.  Working with Mag MORAN at weight loss clinic.  Trying to follow Mediterranean diet, stopped processed food lunchmeat, stop snacking.  Making healthy lifestyle changes. Denies abdominal pain.     History of lumbar back fusion of L3/L4 by Dr. Cisneros on 5/19/2022-right back flare times several months worse with walking.  States \"it is a muscle that has flared and weekend\" more paravertebral.  Not worse with bending.  Would like to return to PT since benefited from therapy previously.  Denies radiation of pain down the leg or up the back.  Denies weakness or numbness of the arms or legs.  Denies any bowel bladder incontinence or perianal anesthesia.  Has cane if needed-helped with back pain previously.      Risk of falls-patient worries during winter months if I see and falls-will injure her knee and back and will use cane cautious.  Does not feel gait is unsteady.  Last DEXA scan in 2021 was completely normal.  No history of fractures or  falls.    Hx of OA in fingers left PIP 3rd and 4th finger swollen-uses naproxen as needed and uses sparingly due to risk of GI side effects.. Unable to wear rings. Tylenol arthritis not helping.  Has appointment with rheumatology 7/23/2024        Wt Readings from Last 6 Encounters:   07/09/24 193 lb 3.2 oz (87.6 kg)   06/25/24 192 lb (87.1 kg)   05/15/24 188 lb (85.3 kg)   03/14/24 182 lb (82.6 kg)   12/15/23 182 lb 6.4 oz (82.7 kg)   09/26/23 188 lb (85.3 kg)   Initial weight 214#        History/Other:   Fall Risk Assessment:   She has been screened for Falls and is High Risk. Fall Prevention information provided to patient in After Visit Summary.    Do you feel unsteady when standing or walking?: No  Do you worry about falling?: Yes (during the winter time,do to knee repair)  Have you fallen in the past year?: No     Cognitive Assessment:   She had a completely normal cognitive assessment - see flowsheet entries     Functional Ability/Status:   Ciarra Sethi has a completely normal functional assessment. See flowsheet for details.        Depression Screening (PHQ):  PHQ-2 SCORE: 0  , done 7/9/2024   If you checked off any problems, how difficult have these problems made it for you to do your work, take care of things at home, or get along with other people?: Not difficult at all    Last [place] Suicide Screening on 7/9/2024 was No Risk.         Advanced Directives:   She does NOT have a Living Will. [Do you have a living will?: No]  She does NOT have a Power of  for Health Care. [Do you have a healthcare power of ?: No]  Discussed Advance Care Planning with patient (and family/surrogate if present). Standard forms made available to patient in After Visit Summary.      Patient Active Problem List   Diagnosis    Essential hypertension, benign    Vitamin D deficiency    Hepatomegaly    Class 1 drug-induced obesity without serious comorbidity with body mass index (BMI) of 32.0 to 32.9 in adult     History of total bilateral knee replacement    Varicosities    Primary osteoarthritis involving multiple joints    Chronic midline low back pain without sciatica    Obesity (BMI 30-39.9)    Hyponatremia    Elevated liver enzymes    MSSA (methicillin-susceptible Staphylococcus aureus) colonization    Dermatitis    Foraminal stenosis of lumbar region    Fatty liver    Right kidney mass    Weight loss    Elevated fasting glucose    At risk for falls    Prediabetes    Unspecified inflammatory spondylopathy, lumbar region (HCC)    Ds DNA antibody positive    Inflammatory arthritis     Allergies:  She has No Known Allergies.    Current Medications:  Outpatient Medications Marked as Taking for the 7/9/24 encounter (Office Visit) with Yeni Beltran, DO   Medication Sig    betamethasone dipropionate 0.05 % External Cream Apply 1 Application topically 2 (two) times daily.    semaglutide (OZEMPIC, 0.25 OR 0.5 MG/DOSE,) 2 MG/3ML Subcutaneous Solution Pen-injector Inject 0.25 mg into the skin once a week.    amLODIPine-Olmesartan 5-40 MG Oral Tab Take 1 tablet by mouth daily.    CUSTOM MEDICATION Semaglutide 0.25mg/cyanobalamin   Concentration: 1/ 0.5 mg/mL  Instructions: Inject 25 units/0.25ml into skin q weekly  Dispense: 1 mL vial    hydrocortisone 2.5 % External Ointment Apply 1 Application topically 2 (two) times daily.    naproxen 500 MG Oral Tab Take 1 tablet (500 mg total) by mouth 2 (two) times daily as needed (arthritis flare). Take with food and water. Stop and call if stomach pain or GI side effects    diclofenac 1 % External Gel Apply 2 g topically 4 (four) times daily. Buy otc       Medical History:  She  has a past medical history of Anesthesia complication, Arthritis, Back problem, BENIGN HYPERTENSION (09/15/2007), Essential hypertension (1/2/1999), High blood pressure, motion sickness, Obesity (2/9/2022), Osteoarthritis, PONV (postoperative nausea and vomiting), Unspecified vitamin D deficiency (11/15/2010),  and Visual impairment.  Surgical History:  She  has a past surgical history that includes other; other (2012,2013); other (2014,2015); total abdom hysterectomy; hysterectomy (1989); knee replacement surgery (2015); other surgical history (2012,2013); Spinal fusion (05/2022); and back surgery (5/10/2022).   Family History:  Her family history includes Arthritis in her brother, brother, mother, sister, sister, and sister; Cancer in her brother and brother; Cancer (age of onset: 56) in her father; Heart Disease in her mother; Heart Disorder (age of onset: 50) in her mother; High Blood Pressure in her mother; Hypertension in her brother, daughter, sister, and son; No Known Problems in her maternal grandfather, maternal grandmother, paternal grandfather, and paternal grandmother; Psychiatric in her mother; parkinson in her son.  Social History:  She  reports that she has never smoked. She has never been exposed to tobacco smoke. She has never used smokeless tobacco. She reports current alcohol use of about 2.0 standard drinks of alcohol per week. She reports that she does not use drugs.    Tobacco:  She has never smoked tobacco.    CAGE Alcohol Screen:   CAGE screening score of 0 on 7/9/2024, showing low risk of alcohol abuse.      Patient Care Team:  Yeni Beltran DO as PCP - General (Family Medicine)  Kinsey Morales RD (Dietitian)  Dee Farah APRN (Nurse Practitioner Family)  Lucas Pratt RD (Dietitian)  Chacho Jauregui MD (GASTROENTEROLOGY)  Leticia Newberry MD (OBSTETRICS & GYNECOLOGY)  Yuri Martini MD (Surgery, Orthopaedic)  Gee Mosquera PT as Physical Therapist  Scarlet Novak PT as Physical Therapist (Physical Therapy)    Review of Systems  GENERAL: feels well otherwise  SKIN: denies any unusual skin lesions  EYES: denies blurred vision or double vision  HEENT: denies nasal congestion, sinus pain or ST  LUNGS: denies shortness of breath with exertion  CARDIOVASCULAR: denies chest pain on  exertion  GI: denies abdominal pain, denies heartburn  : denies dysuria, vaginal discharge or itching, no complaint of urinary incontinence, denies vaginal bleeding  MUSCULOSKELETAL: denies back pain  NEURO: denies headaches  PSYCHE: denies depression or anxiety  HEMATOLOGIC: denies hx of anemia  ENDOCRINE: denies thyroid history  ALL/ASTHMA: denies hx of allergy or asthma    Objective:   Physical Exam  General Appearance:  Alert, cooperative, no distress, appears stated age   Head:  Normocephalic, without obvious abnormality, atraumatic   Eyes:  PERRL, conjunctiva/corneas clear, EOM's intact both eyes   Ears:  Normal TM's and external ear canals, both ears   Nose: Nares normal, septum midline,mucosa normal, no drainage or sinus tenderness   Throat: Lips, mucosa, and tongue normal; teeth and gums normal   Neck: Supple, symmetrical, trachea midline, no adenopathy;  thyroid: not enlarged, symmetric, no tenderness/mass/nodules; no carotid bruit or JVD   Back:   Symmetric, no curvature, ROM normal, no CVA tenderness   Lungs:   Clear to auscultation bilaterally, respirations unlabored   Heart:  Regular rate and rhythm, S1 and S2 normal, no murmur, rub, or gallop   Abdomen:   Soft, non-tender, bowel sounds active all four quadrants,  no masses, no organomegaly   Pelvic:  Musculoskeletal: Patient declined  Right [id number] diffuse tenderness with palpation.  Forward bends 90 degrees.  Normal extension.  No acute spasm.  Negative SLR bilateral   Extremities: Extremities normal, atraumatic, no cyanosis or edema   Pulses: 2+ and symmetric   Skin: Skin color, texture, turgor normal, no rashes or lesions except left anterior shin has recurrent dermatitis with red raised patches-skin is dry but intact no pus or drainage   Lymph nodes: Cervical, supraclavicular, and axillary nodes normal   Neurologic: Normal-SLR negative bilateral.  No foot drop.  Upper lower extremity DTRs are +1/4 bilateral equal and symmetric and  sensation is intact to touch.   Refused breast and pelvic and denied vaginal bleeding    /76 (BP Location: Left arm, Patient Position: Sitting, Cuff Size: adult)   Pulse 68   Temp 97.3 °F (36.3 °C) (Temporal)   Resp 18   Ht 5' 3.19\" (1.605 m)   Wt 193 lb 3.2 oz (87.6 kg)   SpO2 97%   BMI 34.02 kg/m²  Estimated body mass index is 34.02 kg/m² as calculated from the following:    Height as of this encounter: 5' 3.19\" (1.605 m).    Weight as of this encounter: 193 lb 3.2 oz (87.6 kg).    Medicare Hearing Assessment:   Hearing Screening    Screening Method: Finger Rub  Finger Rub Result: Pass         Visual Acuity:   Right Eye Visual Acuity: Corrected Right Eye Chart Acuity: 20/25   Left Eye Visual Acuity: Corrected Left Eye Chart Acuity: 20/25   Both Eyes Visual Acuity: Corrected Both Eyes Chart Acuity: 20/25   Able To Tolerate Visual Acuity: Yes        Assessment & Plan:   Ciarra Sethi is a 73 year old female who presents for a Medicare Assessment.     1. Encounter for annual health examination (Primary)  2. Essential hypertension, benign  -     amLODIPine-Olmesartan; Take 1 tablet by mouth daily.  Dispense: 90 tablet; Refill: 1  -     Detailed, Mod Complex (85240)  -     Comp Metabolic Panel (14); Future; Expected date: 07/09/2024  -     Comp Metabolic Panel (14); Future; Expected date: 01/09/2025  -     Comp Metabolic Panel (14)  Controlled  Encouraged 150-130mins aerobic exercise weekly  <2g Na diet daily and follow Mediterranean diet  Weight loss if overweight  Home b/p monitoring  goal <140/90- 85% of time, optimal 110-120/70-80 bring readings and cuff to each visit.  Continue amlodipine olmesartan  Labs and OV q 6 months  3. Fatty liver  -     Detailed, Mod Complex (10032)  -     CBC With Differential With Platelet; Future; Expected date: 07/09/2024  -     Comp Metabolic Panel (14); Future; Expected date: 07/09/2024  -     Comp Metabolic Panel (14); Future; Expected date: 01/09/2025  -     CBC  With Differential With Platelet  -     Comp Metabolic Panel (14)  Lost 10% of weight which was 21 pounds  Congratulated patient!  Lost more weight but gained some back but working with weight loss clinic  Monitor liver enzymes blood counts.  Asymptomatic no abdominal pain  Encouraged to continue with weight loss    4. At risk for falls  -     Detailed, Mod Complex (89788)  Use cane as needed-helps when back pain flares or during the winter of IC outside just feels safer but denies any balance instability.  Does not need physical therapy for balance strengthening  Fall preventions and handout given and discussed  5. Hyponatremia  -     Detailed, Mod Complex (79093)  -     Comp Metabolic Panel (14); Future; Expected date: 07/09/2024  -     Comp Metabolic Panel (14); Future; Expected date: 01/09/2025  -     Comp Metabolic Panel (14)  Off hydrochlorothiazide was previously taking  Will monitor sodium  6. Prediabetes  -     Detailed, Mod Complex (38246)  -     Comp Metabolic Panel (14); Future; Expected date: 07/09/2024  -     Hemoglobin A1C; Future; Expected date: 07/09/2024  -     Hemoglobin A1C; Future; Expected date: 01/09/2025  -     Comp Metabolic Panel (14); Future; Expected date: 01/09/2025  -     Comp Metabolic Panel (14)  -     Hemoglobin A1C  .start exercising daily walking 30-60min after completes physical therapy  -Recommend low carb and low sugar Mediterranean diet  Encouraged to continue weight loss  Recheck in 6 mos - Hemoglobin A1C; Future    7. Primary osteoarthritis of both hands  -     Naproxen; Take 1 tablet (500 mg total) by mouth 2 (two) times daily as needed (arthritis flare). Take with food and water. Stop and call if stomach pain or GI side effects  Dispense: 60 tablet; Refill: 0  -     Detailed, Mod Complex (53798)  Keep appointment with rheumatologist Dr. Mcgarry initial consult scheduled 7/23/2024  May use naproxen as needed-cautioned risk of GI bleeding with chronic NSAID use can cause  gastritis, ulcers.  Take with food and water and if any's abdominal symptoms, black stool or blood in the stool increased burping excetra then stop medication and call office immediately or go to nearest ER    8. Chronic midline low back pain without sciatica  Orders:  -     Detailed, Mod Complex (43303)  -     Neurosurgery Referral - In Network- / [facility]  -     Physical Therapy Referral - [facility]  Neurovascular intact  Midline back pain is controlled  Having musculoskeletal pain right lumbar back paravertebral  Start physical therapy  If not improving then will follow-up with neurosurg    9. Asymptomatic menopause  -     XR DEXA BONE DENSITOMETRY (CPT=77080); Future; Expected date: 07/09/2024  Last DEXA scan normal-not taking calcium or vitamin D3 or multivitamin  Encouraged weightbearing exercises 30 minutes 3 times a week and recommended MVI daily and encourage calcium rich diet    10. Screen for colon cancer  -     GASTRO - INTERNAL-overdue for repeat colonoscopy schedule ASAP    11. Encounter for screening mammogram for malignant neoplasm of breast  -     Cancel: JORGE NEERU 2D+3D SCREENING BILAT (CPT=77067/90372); Future; Expected date: 07/09/2024  -     JORGE NEERU 2D+3D SCREENING BILAT (CPT=77067/97103); Future; Expected date: 04/09/2025    12. Elevated liver enzymes  -     Comp Metabolic Panel (14); Future; Expected date: 07/09/2024  -     Comp Metabolic Panel (14); Future; Expected date: 01/09/2025  -     Comp Metabolic Panel (14)  Due to fatty liver disease monitor    14. Weight gain  -     TSH W Reflex To Free T4; Future; Expected date: 07/09/2024  -     TSH W Reflex To Free T4  Continue working with weight loss clinic and considering restarting semaglutide    15. Screening, ischemic heart disease  -     Lipid Panel; Future; Expected date: 01/09/2025  Normal lipids 12/2019 May complete every 5 years    The patient indicates understanding of these issues and agrees to the plan.  Consult  ordered.  Imaging studies ordered.  Lab work ordered.  Prescription medication ordered.  Reinforced healthy diet, lifestyle, and exercise.      Return in 6 months (on 1/9/2025).     Yeni Beltran DO, 7/9/2024     Supplementary Documentation:   General Health:  In the past six months, have you lost more than 10 pounds without trying?: 2 - No  Has your appetite been poor?: No  Type of Diet: Balanced  How does the patient maintain a good energy level?: Other (per patient has been doing little ecercise do to RA pain)  How would you describe your daily physical activity?: Light  How would you describe your current health state?: Good  How do you maintain positive mental well-being?: Visiting Friends;Visiting Family;Social Interaction  On a scale of 0 to 10, with 0 being no pain and 10 being severe pain, what is your pain level?: 0 - (None)  In the past six months, have you experienced urine leakage?: 0-No  At any time do you feel concerned for the safety/well-being of yourself and/or your children, in your home or elsewhere?: No  Have you had any immunizations at another office such as Influenza, Hepatitis B, Tetanus, or Pneumococcal?: No    Health Maintenance   Topic Date Due    Colorectal Cancer Screening  02/05/2023    COVID-19 Vaccine (7 - 2023-24 season) 02/28/2024    Annual Physical  06/15/2024    Influenza Vaccine (1) 10/01/2024    Mammogram  04/09/2025    DEXA Scan  Completed    Annual Depression Screening  Completed    Fall Risk Screening (Annual)  Completed    Pneumococcal Vaccine: 65+ Years  Completed    Zoster Vaccines  Completed

## 2024-07-09 NOTE — PROGRESS NOTES
Patient came in for draw of ordered fasting labs. Patient drawn out of left  AC, x 1 attempt and tolerated well.  Light green, lavender  tube drawn.      moderate assist (50% patients effort)

## 2024-07-13 ENCOUNTER — HOSPITAL ENCOUNTER (OUTPATIENT)
Dept: BONE DENSITY | Age: 74
Discharge: HOME | End: 2024-07-13
Attending: FAMILY MEDICINE
Payer: MEDICARE

## 2024-07-13 DIAGNOSIS — Z78.0 ASYMPTOMATIC MENOPAUSE: ICD-10-CM

## 2024-07-13 PROCEDURE — 77080 DXA BONE DENSITY AXIAL: CPT | Performed by: FAMILY MEDICINE

## 2024-07-23 ENCOUNTER — TELEPHONE (OUTPATIENT)
Dept: DERMATOLOGY | Age: 74
End: 2024-07-23

## 2024-07-23 ENCOUNTER — OFFICE VISIT (OUTPATIENT)
Dept: RHEUMATOLOGY | Facility: CLINIC | Age: 74
End: 2024-07-23
Payer: MEDICARE

## 2024-07-23 VITALS
RESPIRATION RATE: 16 BRPM | HEIGHT: 64.5 IN | OXYGEN SATURATION: 98 % | WEIGHT: 191 LBS | TEMPERATURE: 97 F | SYSTOLIC BLOOD PRESSURE: 136 MMHG | BODY MASS INDEX: 32.21 KG/M2 | DIASTOLIC BLOOD PRESSURE: 68 MMHG | HEART RATE: 111 BPM

## 2024-07-23 DIAGNOSIS — M25.50 ARTHRALGIA, UNSPECIFIED JOINT: ICD-10-CM

## 2024-07-23 DIAGNOSIS — M19.042 PRIMARY OSTEOARTHRITIS OF BOTH HANDS: ICD-10-CM

## 2024-07-23 DIAGNOSIS — M19.041 PRIMARY OSTEOARTHRITIS OF BOTH HANDS: ICD-10-CM

## 2024-07-23 DIAGNOSIS — M79.641 BILATERAL HAND PAIN: ICD-10-CM

## 2024-07-23 DIAGNOSIS — R76.8 DS DNA ANTIBODY POSITIVE: Primary | ICD-10-CM

## 2024-07-23 DIAGNOSIS — M1A.9XX1 TOPHUS OF LEFT HAND DUE TO GOUT: ICD-10-CM

## 2024-07-23 DIAGNOSIS — Z51.81 THERAPEUTIC DRUG MONITORING: ICD-10-CM

## 2024-07-23 DIAGNOSIS — M10.9 GOUT, UNSPECIFIED CAUSE, UNSPECIFIED CHRONICITY, UNSPECIFIED SITE: ICD-10-CM

## 2024-07-23 DIAGNOSIS — M79.642 BILATERAL HAND PAIN: ICD-10-CM

## 2024-07-23 PROCEDURE — 99204 OFFICE O/P NEW MOD 45 MIN: CPT | Performed by: INTERNAL MEDICINE

## 2024-07-23 NOTE — PATIENT INSTRUCTIONS
Get blood work and hand x-rays    If hand x-rays are borderline, would pursue left hand DECT.     Consider skin biopsy of the left shin given the +dsDNA and the refractory nature.

## 2024-07-23 NOTE — PROGRESS NOTES
Rheumatology New Patient Note  =====================================================================================================    Date of visit: 7/23/2024  ?  Chief complaint: +dsDNA  Chief Complaint   Patient presents with    New Patient     New patient referred by her primary. She has pain and stiffness in her hands/fingers. She states she can not close her hands completely. 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 Bilateral knee replacements and spinal fusion 1 1/2 years ago. Rapid 3 score is a 4.3.     ?  Referring (will send letter)  PCP  Yeni Beltran DO  Fax: [phone]  Phone: [phone]        =====================================================================================================  HPI    Ciarra Sethi is a 73 year old female     Here for evaluation of polyarthralgia in the setting of a + dsDNA antibody.  Hx of lumbar spinal fusion. And history of bilateral TKA  -more hand pain recently.  Long history of chronic hand pain.  Diagnoses osteoarthritis in the past.  Hard to make a full fist.  Pain with typing.    Hx of dermatitis. Very itching skin.  Mostly in the left lower extremity.  No biopsy in the past. Using steroid creams which somewhat help.    Denies current alopecia, malar rash, photosensitivity rash, discoid lesions, oral/nasal ulcers, pleuritic chest pain,  seizures/psychosis, Raynaud's, dry eyes/mouth, or blood clots.    Denies miscarriages or obstetric events (early pre-eclampsia, IUGR, placental insufficiency)  -Prior pregnancies and/or children: 3 children.  --Pregnancy complications: no  -no miscarriages    Takes naproxen prn.   History of gout in pregnancy. Son with gout.      struggling with CAD s/p CABG in aftermath due to this.      14 point ROS negative except noted above    Medications:  Current Outpatient Medications on File Prior to Visit   Medication Sig Dispense Refill    NON FORMULARY Betamethasone ointment       amLODIPine-Olmesartan 5-40 MG Oral Tab Take 1 tablet by mouth daily. 90 tablet 1    naproxen 500 MG Oral Tab Take 1 tablet (500 mg total) by mouth 2 (two) times daily as needed (arthritis flare). Take with food and water. Stop and call if stomach pain or GI side effects 60 tablet 0    semaglutide (OZEMPIC, 1 MG/DOSE,) 4 MG/3ML Subcutaneous Solution Pen-injector Inject 1 mg into the skin once a week. 9 mL 0    [DISCONTINUED] semaglutide (OZEMPIC, 0.25 OR 0.5 MG/DOSE,) 2 MG/3ML Subcutaneous Solution Pen-injector Inject 0.25 mg into the skin once a week. (Patient not taking: Reported on 7/23/2024) 3 mL 0    [DISCONTINUED] semaglutide 4 MG/3ML Subcutaneous Solution Pen-injector Inject 1 mg into the skin once a week. (Patient not taking: Reported on 7/23/2024) 9 mL 0     No current facility-administered medications on file prior to visit.       Past Medical History:  Past Medical History:    Anesthesia complication    Arthritis    Back problem    BENIGN HYPERTENSION    Essential hypertension    High blood pressure    Hx of motion sickness    Obesity    Osteoarthritis    PONV (postoperative nausea and vomiting)    Unspecified vitamin D deficiency    Visual impairment    glasses     Past Surgical History:  Past Surgical History:   Procedure Laterality Date    Back surgery  5/10/2022    Hysterectomy  1989    Knee replacement surgery  2015    left knee replacement surgery in August 2015    Other      cyst removal on left 3rd toe- local anesthesia    Other  2012,2013    both wrists reconstructed    Other  2014,2015    knee replacements    Other surgical history  2012,2013    LEFT AND RIGHT wrist re construction     Spinal fusion  05/2022    Total abdom hysterectomy      Partial.     Family History:  Family History   Problem Relation Age of Onset    Arthritis Mother     High Blood Pressure Mother     Heart Disease Mother     Heart Disorder Mother 50        angina    Psychiatric Mother         DEMENTIA    Hypertension Daughter      Other (parkinson) Son     Hypertension Son     Cancer Father 56        gallbladder    Arthritis Sister     Hypertension Sister     Arthritis Brother         x2 brothers    Hypertension Brother     No Known Problems Maternal Grandmother     No Known Problems Maternal Grandfather     No Known Problems Paternal Grandmother     No Known Problems Paternal Grandfather     Cancer Brother     Arthritis Brother     Arthritis Sister     Arthritis Sister     Cancer Brother      Social History:  Social History     Socioeconomic History    Marital status:    Tobacco Use    Smoking status: Never     Passive exposure: Never    Smokeless tobacco: Never   Vaping Use    Vaping status: Never Used   Substance and Sexual Activity    Alcohol use: Yes     Alcohol/week: 2.0 standard drinks of alcohol     Types: 2 Glasses of wine per week     Comment: 2 glasses a day    Drug use: No    Sexual activity: Not Currently     Birth control/protection: Hysterectomy   Other Topics Concern    Caffeine Concern No    Exercise Yes    Seat Belt No    Special Diet No    Stress Concern No    Weight Concern Yes   Social History Narrative    : yes    Children: 3    Exercise: walks, Fit    Employment: teaches and runs a [employer]    Caffeine intake: occ         Social Determinants of Health      Received from [facility], [facility]    Social Connections    Received from [facility], [facility]    Housing Stability     ?  Allergies:  No Known Allergies      Objective    Vitals:    07/23/24 1017   BP: 136/68   Pulse: 111   Resp: 16   Temp: 97.3 °F (36.3 °C)   SpO2: 98%   Weight: 191 lb (86.6 kg)   Height: 5' 4.5\" (1.638 m)       GEN: NAD, well-nourished.   HEENT: Head: NCAT. Face: No lesions. Eyes: Conjunctiva clear. Sclera are anicteric. PERRLA. EOMs are full. Ears: The right and left ear canals are clear.  Nose: No external or internal nasal deformities. Nasal septum  is midline. Mouth: The lips are within normal limits.  No oral ulcers Tongue is midline with no lesions. The oral cavity is clear.   Neck: Supple. No neck masses. No thyromegaly. No LAD, parotid or submandicular gland palpated.   CV: RRR, no mrg, S1/S2  PULM: CTAB, no wrr, easy effort  Extremities: No cyanosis, edema or deformities.   Neurologic: Strength, CN2-12 grossly intact   Psych: normal affect.   Skin: No lesions or rashes.  MSK: 28 joint count performed. No evidence of synovitis in mcp, pip, dip, wrist, elbows, shoulders, hips, knees, ankles, mtp unless otherwise noted. Full ROM of elbows, wrists, knees.     Left greater than right MCP thickening with significant osteochondral hypertrophy of the bilateral PIPs, worse on the left.  Heberden's nodes of the DIPs noted.  Significant effusions of the bilateral PIPs with tenderness.    Bilateral CMC OA changes    ?  Labs:    12/2023  dsDNA 15.7  Uric acid 4.5  Rf/ccp neg        Lab Results   Component Value Date    WBC 5.9 07/09/2024    RBC 4.16 07/09/2024    HGB 13.8 07/09/2024    HCT 39.5 07/09/2024    .0 07/09/2024    MCV 95.0 07/09/2024    MCH 33.2 07/09/2024    MCHC 34.9 07/09/2024    RDW 14.6 07/09/2024    NEPRELIM 3.92 07/09/2024    NEPERCENT 66.6 07/09/2024    LYPERCENT 20.5 07/09/2024    MOPERCENT 9.3 07/09/2024    EOPERCENT 2.4 07/09/2024    BAPERCENT 0.7 07/09/2024    NE 3.92 07/09/2024    LYMABS 1.21 07/09/2024    MOABSO 0.55 07/09/2024    EOABSO 0.14 07/09/2024    BAABSO 0.04 07/09/2024     Lab Results   Component Value Date     (H) 07/09/2024    BUN 12 07/09/2024    BUNCREA 14.6 03/05/2021    CREATSERUM 0.98 07/09/2024    ANIONGAP 6 07/09/2024    GFRNAA 84 07/01/2022    GFRAA 97 07/01/2022    CA 9.6 07/09/2024    OSMOCALC 276 07/09/2024    ALKPHO 105 07/09/2024    AST 25 07/09/2024    ALT 24 07/09/2024    BILT 0.8 07/09/2024    TP 7.7 07/09/2024    ALB 4.0 07/09/2024    GLOBULIN 3.7 07/09/2024    AGRATIO 1.7 05/16/2013     (L)  07/09/2024    K 4.4 07/09/2024     07/09/2024    CO2 27.0 07/09/2024         No results found for: \"ANATI\", \"DRISS\", \"ANAS\", \"ANASCRN\", \"ANASCRNRFLX\", \"CHONG\"  No results found for: \"SSA\", \"SSAUR\", \"ANTISSA\", \"SSA52\", \"SSA60\", \"SSADD\", \"SSB\", \"ANTISSB\"  No results found for: \"DSDNA\", \"ANTIDSDNA\", \"SMUD\", \"ANTISM\", \"SM\", \"RNP\", \"ANTIRNP\", \"SMITHRNP\"  No results found for: \"SCL70\", \"SCL\", \"MLPXPHP30\"  No results found for: \"C3\", \"C4\"  No results found for: \"DRVVT\", \"LAINT\", \"PTTLUPUS\", \"LUPUSINTERP\", \"LA\", \"U8HS2ICCEV\", \"X5PT0DQATP\", \"L6IOBCNATH\", \"X9OUPPTULU\"  No results found for: \"CARDIOLIPIGG\", \"CARDIOLIPIGM\", \"CARDIOLIPIGA\", \"CARDIOIGA\", \"CARLIP\"      Additional Labs:    Radiology:  2019: XR hands c/w OA      Radiology review:  XR DEXA BONE DENSITOMETRY (CPT=77080)    Result Date: 7/13/2024  CONCLUSION:  Bone mineral density values are within normal range when compared to normal patient population.  Recommendation:  The National Osteoporosis Foundation (NOF) recommends pharmacological treatment for patients with a FRAX 10-year risk score of 3% or higher for a hip fracture or 20% or higher for a major osteoporotic fracture, to prevent osteoporosis and reduce fracture risk.  The World Health Organization has defined the following categories based on bone density: Normal bone:  T-score greater than or equal to -1 Osteopenia: T-score  less than -1 and greater  than -2.5 Osteoporosis:  T-score less than or equal -2.5   LOCATION:  [facility]     Dictated by (CST): Anival De La Rosa MD on 7/13/2024 at 11:18 AM     Finalized by (CST): Anival De La Rosa MD on 7/13/2024 at 11:19 AM      =====================================================================================================  Assessment and Plan    Assessment:  1. Ds DNA antibody positive    2. Gout, unspecified cause, unspecified chronicity, unspecified site    3. Bilateral hand pain    4. Arthralgia, unspecified joint    5. Therapeutic drug monitoring    6.  Primary osteoarthritis of both hands      Elevated dsDNA antibody but no clinical features at this point that I can directly tied to systemic autoimmune disease such as systemic lupus erythematosus.  -Chronic left shin rash of unclear etiology  -Bilateral hand osteoarthritis with significant PIP effusions likely represents a combination of hand osteoarthritis and potentially an additional inflammatory arthropathy.  Exam less consistent with SLE arthritis.    #Chronic musculoskeletal pain, osteoarthritis: S/p bilateral TKA, lumbar fusion    #Gout: Active in pregnancy.  No flareups since then.  -son with gout.   ?  Plan:  -Additional positive dsDNA workup: Repeat dsDNA by EIA and IFA, UPCR, U/A, inflammatory markers, complements, antiphospholipid antibodies  -Repeat uric acid  -X-ray of the bilateral hands to evaluate for erosive changes.    -If no findings consistent with gout then I recommend proceeding with a dual-energy CT which will evaluate for monosodium urate deposition in the hands given my suspicion for gouty arthropathy  -If dsDNA continues to be positive, could consider left shin skin biopsy to evaluate for cutaneous lupus.    Rtc after the above    ==============================================================================================================  Addendum: 07/26/24    Reviewed left hand x-rays and right hand x-rays.  Some concerning features for tophi in the PIPs.  Dual Energy CT (DECT) scan needed to detect gouty arthropathy and tophi of the left hand.  Please talk to Dr. Adkins's or another [facility] radiologist's team to schedule this in the correct scanner.       Diagnoses and all orders for this visit:    Ds DNA antibody positive  -     Complement C3, Serum; Future  -     Complement C4, Serum; Future  -     Sed Rate, Westergren (Automated); Future  -     C-Reactive Protein; Future  -     Urinalysis with Culture Reflex; Future  -     Protein,Total,Urine, Random; Future  -     Creatinine, Urine,  Random; Future  -     dsDNA Antibody by IFA; Future  -     Anti-dsDNA Antibody, IgG; Future  -     Anticardiolipin Ab, IGG, Qn; Future  -     Anticardiolipin AB, IGM, Qn; Future  -     Beta 2 Glycoprotein I AB, G/M; Future  -     Lupus Anticoagulant Comp; Future  -     Uric Acid; Future  -     XR HAND BILAT (MIN 3 VIEWS) (CPT=73130-50); Future    Gout, unspecified cause, unspecified chronicity, unspecified site  -     Complement C3, Serum; Future  -     Complement C4, Serum; Future  -     Sed Rate, Westergren (Automated); Future  -     C-Reactive Protein; Future  -     Urinalysis with Culture Reflex; Future  -     Protein,Total,Urine, Random; Future  -     Creatinine, Urine, Random; Future  -     dsDNA Antibody by IFA; Future  -     Anti-dsDNA Antibody, IgG; Future  -     Anticardiolipin Ab, IGG, Qn; Future  -     Anticardiolipin AB, IGM, Qn; Future  -     Beta 2 Glycoprotein I AB, G/M; Future  -     Lupus Anticoagulant Comp; Future  -     Uric Acid; Future  -     XR HAND BILAT (MIN 3 VIEWS) (CPT=73130-50); Future    Bilateral hand pain  -     XR HAND BILAT (MIN 3 VIEWS) (CPT=73130-50); Future    Arthralgia, unspecified joint  -     Lupus Anticoagulant Comp; Future  -     XR HAND BILAT (MIN 3 VIEWS) (CPT=73130-50); Future    Therapeutic drug monitoring  -     Complement C3, Serum; Future  -     Complement C4, Serum; Future  -     Sed Rate, Westergren (Automated); Future  -     C-Reactive Protein; Future  -     Urinalysis with Culture Reflex; Future  -     Protein,Total,Urine, Random; Future  -     Creatinine, Urine, Random; Future  -     dsDNA Antibody by IFA; Future  -     Anti-dsDNA Antibody, IgG; Future  -     Anticardiolipin Ab, IGG, Qn; Future  -     Anticardiolipin AB, IGM, Qn; Future  -     Beta 2 Glycoprotein I AB, G/M; Future  -     Lupus Anticoagulant Comp; Future  -     Uric Acid; Future  -     XR HAND BILAT (MIN 3 VIEWS) (CPT=73130-50); Future    Primary osteoarthritis of both hands        No follow-ups  on file.      The above plan of care, diagnosis, orders, and follow-up were discussed with the patient. Questions related to this recommended plan of care were answered.    Thank you for referring this delightful patient to me. Please feel free to contact me with any questions.     This report was performed utilizing speech recognition software technology. Despite proofreading, speech recognition errors could escape detection. If a word or phrase is confusing or out of context, please do not hesitate to call for   clarification.       Kind regards      Slade Mcgarry MD  [facility]

## 2024-07-24 ENCOUNTER — HOSPITAL ENCOUNTER (OUTPATIENT)
Dept: GENERAL RADIOLOGY | Age: 74
Discharge: HOME | End: 2024-07-24
Attending: INTERNAL MEDICINE
Payer: MEDICARE

## 2024-07-24 ENCOUNTER — LAB ENCOUNTER (OUTPATIENT)
Dept: LAB | Age: 74
End: 2024-07-24
Attending: INTERNAL MEDICINE
Payer: MEDICARE

## 2024-07-24 DIAGNOSIS — R76.8 DS DNA ANTIBODY POSITIVE: ICD-10-CM

## 2024-07-24 DIAGNOSIS — M79.641 BILATERAL HAND PAIN: ICD-10-CM

## 2024-07-24 DIAGNOSIS — Z51.81 THERAPEUTIC DRUG MONITORING: ICD-10-CM

## 2024-07-24 DIAGNOSIS — M25.50 ARTHRALGIA, UNSPECIFIED JOINT: ICD-10-CM

## 2024-07-24 DIAGNOSIS — M79.642 BILATERAL HAND PAIN: ICD-10-CM

## 2024-07-24 DIAGNOSIS — M10.9 GOUT, UNSPECIFIED CAUSE, UNSPECIFIED CHRONICITY, UNSPECIFIED SITE: ICD-10-CM

## 2024-07-24 PROCEDURE — 85610 PROTHROMBIN TIME: CPT

## 2024-07-24 PROCEDURE — 86160 COMPLEMENT ANTIGEN: CPT

## 2024-07-24 PROCEDURE — 86147 CARDIOLIPIN ANTIBODY EA IG: CPT

## 2024-07-24 PROCEDURE — 84550 ASSAY OF BLOOD/URIC ACID: CPT

## 2024-07-24 PROCEDURE — 86225 DNA ANTIBODY NATIVE: CPT

## 2024-07-24 PROCEDURE — 87086 URINE CULTURE/COLONY COUNT: CPT

## 2024-07-24 PROCEDURE — 86146 BETA-2 GLYCOPROTEIN ANTIBODY: CPT

## 2024-07-24 PROCEDURE — 85705 THROMBOPLASTIN INHIBITION: CPT

## 2024-07-24 PROCEDURE — 85732 THROMBOPLASTIN TIME PARTIAL: CPT

## 2024-07-24 PROCEDURE — 73130 X-RAY EXAM OF HAND: CPT | Performed by: INTERNAL MEDICINE

## 2024-07-24 PROCEDURE — 85652 RBC SED RATE AUTOMATED: CPT

## 2024-07-24 PROCEDURE — 86140 C-REACTIVE PROTEIN: CPT

## 2024-07-24 PROCEDURE — 36415 COLL VENOUS BLD VENIPUNCTURE: CPT

## 2024-07-24 PROCEDURE — 81001 URINALYSIS AUTO W/SCOPE: CPT

## 2024-07-24 PROCEDURE — 85613 RUSSELL VIPER VENOM DILUTED: CPT

## 2024-07-25 ENCOUNTER — OFFICE VISIT (OUTPATIENT)
Dept: DERMATOLOGY | Age: 74
End: 2024-07-25

## 2024-07-25 DIAGNOSIS — I87.2 STASIS DERMATITIS: Primary | ICD-10-CM

## 2024-07-25 LAB
B2 GLYCOPROT1 IGG SERPL IA-ACNC: 1.3 U/ML (ref ?–7)
B2 GLYCOPROT1 IGM SERPL IA-ACNC: <2.4 U/ML (ref ?–7)
BILIRUB UR QL STRIP.AUTO: NEGATIVE
C3 SERPL-MCNC: 118 MG/DL (ref 90–170)
C4 SERPL-MCNC: 25.7 MG/DL (ref 12–36)
CARDIOLIPIN IGG SERPL-ACNC: 1.8 GPL (ref ?–10)
CARDIOLIPIN IGM SERPL-ACNC: 2.4 MPL (ref ?–10)
CRP SERPL-MCNC: <0.4 MG/DL (ref ?–0.5)
DSDNA IGG SERPL IA-ACNC: 16.6 IU/ML
ERYTHROCYTE [SEDIMENTATION RATE] IN BLOOD: 13 MM/HR
GLUCOSE UR STRIP.AUTO-MCNC: NORMAL MG/DL
KETONES UR STRIP.AUTO-MCNC: NEGATIVE MG/DL
LEUKOCYTE ESTERASE UR QL STRIP.AUTO: 250
NITRITE UR QL STRIP.AUTO: NEGATIVE
PH UR STRIP.AUTO: 5.5 [PH] (ref 5–8)
PROT UR STRIP.AUTO-MCNC: 50 MG/DL
RBC UR QL AUTO: NEGATIVE
SP GR UR STRIP.AUTO: 1.02 (ref 1–1.03)
URATE SERPL-MCNC: 5.5 MG/DL
UROBILINOGEN UR STRIP.AUTO-MCNC: NORMAL MG/DL

## 2024-07-25 RX ORDER — TRIAMCINOLONE ACETONIDE 40 MG/ML
60 INJECTION, SUSPENSION INTRA-ARTICULAR; INTRAMUSCULAR ONCE
Status: COMPLETED | OUTPATIENT
Start: 2024-07-25 | End: 2024-07-25

## 2024-07-25 RX ORDER — CLOBETASOL PROPIONATE 0.5 MG/G
1 CREAM TOPICAL 2 TIMES DAILY
Qty: 30 G | Refills: 1 | Status: SHIPPED | OUTPATIENT
Start: 2024-07-25

## 2024-07-25 RX ADMIN — TRIAMCINOLONE ACETONIDE 60 MG: 40 INJECTION, SUSPENSION INTRA-ARTICULAR; INTRAMUSCULAR at 13:30

## 2024-07-26 LAB
APTT PPP: 26.4 SECONDS (ref 23–36)
DSDNA AB TITR SER: <10 {TITER}
INR BLD: 0.99 (ref 0.85–1.16)
LA 3 SCREEN W REFLEX-IMP: NEGATIVE
PROTHROMBIN TIME: 13.1 SECONDS (ref 11.6–14.8)
SCREEN DRVVT: 0.98 S (ref 0–1.29)
SCREEN DRVVT: NEGATIVE S
STACLOT LA DELTA: 2.9 SECONDS (ref ?–8)

## 2024-07-29 NOTE — PROGRESS NOTES
Phoned pt, advised pt to call CS with number provided. Explained to pt that this test will need to be scheduled in  4 at [facility], [facility]. Voices understanding.

## 2024-07-30 ENCOUNTER — TELEPHONE (OUTPATIENT)
Dept: RHEUMATOLOGY | Facility: CLINIC | Age: 74
End: 2024-07-30

## 2024-07-30 LAB
ASR DISCLAIMER: NORMAL
CASE RPRT: NORMAL
CLINICAL INFO: NORMAL
PATH REPORT.FINAL DX SPEC: NORMAL
PATH REPORT.GROSS SPEC: NORMAL

## 2024-07-30 NOTE — TELEPHONE ENCOUNTER
Pt called office, pt called CS to set up her appt for Dual energy CT in [facility] and they were not able to schedule.     This RN phoned CS, spoke to rep. Explained testing and to schedule pt for [address]. States she will contact pt to buck for the correct scanner.     Future Appointments   Date Time Provider Department Center   8/9/2024  4:30 PM [facility] [address] [facility]   11/1/2024 10:40 AM Dee Farah APRN [facility]   2/10/2025 10:00 AM Dee Farah APRN [facility]

## 2024-08-09 ENCOUNTER — HOSPITAL ENCOUNTER (OUTPATIENT)
Dept: CT IMAGING | Facility: HOSPITAL | Age: 74
Discharge: HOME | End: 2024-08-09
Attending: INTERNAL MEDICINE
Payer: MEDICARE

## 2024-08-09 DIAGNOSIS — M1A.9XX1 TOPHUS OF LEFT HAND DUE TO GOUT: ICD-10-CM

## 2024-08-09 DIAGNOSIS — M10.9 GOUT, UNSPECIFIED CAUSE, UNSPECIFIED CHRONICITY, UNSPECIFIED SITE: ICD-10-CM

## 2024-08-09 PROCEDURE — 73200 CT UPPER EXTREMITY W/O DYE: CPT | Performed by: INTERNAL MEDICINE

## 2024-08-15 ENCOUNTER — OFFICE VISIT (OUTPATIENT)
Dept: RHEUMATOLOGY | Facility: CLINIC | Age: 74
End: 2024-08-15
Payer: MEDICARE

## 2024-08-15 VITALS
HEART RATE: 90 BPM | SYSTOLIC BLOOD PRESSURE: 148 MMHG | HEIGHT: 64 IN | BODY MASS INDEX: 32.61 KG/M2 | WEIGHT: 191 LBS | DIASTOLIC BLOOD PRESSURE: 70 MMHG | RESPIRATION RATE: 18 BRPM | TEMPERATURE: 98 F | OXYGEN SATURATION: 98 %

## 2024-08-15 DIAGNOSIS — M1A.00X1 IDIOPATHIC CHRONIC GOUT WITH TOPHUS, UNSPECIFIED SITE: Primary | ICD-10-CM

## 2024-08-15 DIAGNOSIS — M19.271 OTHER SECONDARY OSTEOARTHRITIS OF BOTH FEET: ICD-10-CM

## 2024-08-15 DIAGNOSIS — Z51.81 THERAPEUTIC DRUG MONITORING: ICD-10-CM

## 2024-08-15 DIAGNOSIS — M19.042 PRIMARY OSTEOARTHRITIS OF BOTH HANDS: ICD-10-CM

## 2024-08-15 DIAGNOSIS — M79.641 BILATERAL HAND PAIN: ICD-10-CM

## 2024-08-15 DIAGNOSIS — M19.272 OTHER SECONDARY OSTEOARTHRITIS OF BOTH FEET: ICD-10-CM

## 2024-08-15 DIAGNOSIS — M79.642 BILATERAL HAND PAIN: ICD-10-CM

## 2024-08-15 DIAGNOSIS — M19.041 PRIMARY OSTEOARTHRITIS OF BOTH HANDS: ICD-10-CM

## 2024-08-15 PROCEDURE — 99214 OFFICE O/P EST MOD 30 MIN: CPT | Performed by: INTERNAL MEDICINE

## 2024-08-15 PROCEDURE — G2211 COMPLEX E/M VISIT ADD ON: HCPCS | Performed by: INTERNAL MEDICINE

## 2024-08-15 RX ORDER — COLCHICINE 0.6 MG/1
0.6 TABLET ORAL DAILY
Qty: 90 TABLET | Refills: 2 | Status: SHIPPED | OUTPATIENT
Start: 2024-08-15

## 2024-08-15 RX ORDER — ALLOPURINOL 100 MG/1
TABLET ORAL
Qty: 180 TABLET | Refills: 1 | Status: SHIPPED | OUTPATIENT
Start: 2024-08-15 | End: 2024-11-13

## 2024-08-15 RX ORDER — PREDNISONE 5 MG/1
TABLET ORAL
Qty: 32 TABLET | Refills: 0 | Status: SHIPPED | OUTPATIENT
Start: 2024-08-15 | End: 2024-08-25

## 2024-08-15 NOTE — PATIENT INSTRUCTIONS
Allopurinol uptitration:  1 tablet (100 mg total) daily for 30 days  THEN 2 tablets (200 mg total) daily for 30 days  THEN 3 tablets (300 mg total) daily. After 15 days of being on this dose, repeat bloodwork.     Colchicine (to prevent gout flares while starting on allopurinol). Generally, you will need to be on colchicine for 6-12 months, when the risk of gout flares is highest during allopurinol initiation.  Colchicine 0.6 mg tab: daily     Prednisone taper given (only as needed for gout flares):    6 tabs (30 mg) daily for 2 days  THEN 4 tabs (20 mg) daily for 2 days,   THEN 3 tabs (15 mg) daily for 2 day  THEN 2 tabs (10 mg ) daily for 2 days  THEN 1 tablet (5 mg total) daily for 2 days.  ==============================================================================================================    -Will send colchicine prescription here.   Check out the following pharmacy.  It may save you some money.  [facility]. Phone: [phone]  [email or web address]

## 2024-08-15 NOTE — PROGRESS NOTES
Rheumatology f/u Patient Note  =====================================================================================================    Chief complaint: gout  Chief Complaint   Patient presents with    Follow - Up     3 month f/u. Hand pain and hard to walk. Still has rash and did biopsy. Here to go over test results and next steps in management. Converted rapid score of 6.0     PCP  Yeni Beltran DO  Fax: [phone]  Phone: [phone]    =====================================================================================================  HPI   Date of visit: 7/23/2024    Ciarra Sethi is a 73 year old female     Here for evaluation of polyarthralgia in the setting of a + dsDNA antibody.  Hx of lumbar spinal fusion. And history of bilateral TKA  -more hand pain recently.  Long history of chronic hand pain.  Diagnoses osteoarthritis in the past.  Hard to make a full fist.  Pain with typing.    Hx of dermatitis. Very itching skin.  Mostly in the left lower extremity.  No biopsy in the past. Using steroid creams which somewhat help.    Denies current alopecia, malar rash, photosensitivity rash, discoid lesions, oral/nasal ulcers, pleuritic chest pain,  seizures/psychosis, Raynaud's, dry eyes/mouth, or blood clots.    Denies miscarriages or obstetric events (early pre-eclampsia, IUGR, placental insufficiency)  -Prior pregnancies and/or children: 3 children.  --Pregnancy complications: no  -no miscarriages    Takes naproxen prn.   History of gout in pregnancy. Son with gout.      struggling with CAD s/p CABG in aftermath due to this.    ==============================================================================================================  Today's Visit: 08/15/24    Here to discuss dual-energy CT.  Had a skin biopsy of the lower extremity rash.  This was consistent with venous stasis.  Bilateral foot pain and antalgic gait persist.    7/30/2024  Skin, left leg, shave biopsy:  -Venous stasis  dermatitis (see comment).      5 point ROS negative except noted above  I had reviewed past medical and family histories together with allergy and medication lists documented.      Medications:  Current Outpatient Medications on File Prior to Visit   Medication Sig Dispense Refill    NON FORMULARY Betamethasone ointment      amLODIPine-Olmesartan 5-40 MG Oral Tab Take 1 tablet by mouth daily. 90 tablet 1    naproxen 500 MG Oral Tab Take 1 tablet (500 mg total) by mouth 2 (two) times daily as needed (arthritis flare). Take with food and water. Stop and call if stomach pain or GI side effects 60 tablet 0    semaglutide (OZEMPIC, 1 MG/DOSE,) 4 MG/3ML Subcutaneous Solution Pen-injector Inject 1 mg into the skin once a week. 9 mL 0     No current facility-administered medications on file prior to visit.     ?  Allergies:  No Known Allergies    Objective    Vitals:    08/15/24 0855   BP: 148/70   Pulse: 90   Resp: 18   Temp: 97.6 °F (36.4 °C)   SpO2: 98%   Weight: 191 lb (86.6 kg)   Height: 5' 4\" (1.626 m)     GEN: NAD, well-nourished.   HEENT: Head: NCAT. Face: No lesions. Eyes: Conjunctiva clear.   PULM:  easy effort  Extremities: No cyanosis, edema or deformities.   Neurologic: Strength, CN2-12 grossly intact   Skin: No lesions or rashes.  MSK: 28 joint count performed. No evidence of synovitis in mcp, pip, dip, wrist, elbows, shoulders, hips, knees, ankles, mtp unless otherwise noted. Full ROM of elbows, wrists, knees.       Left greater than right MCP thickening with significant osteochondral hypertrophy of the bilateral PIPs, worse on the left.  Heberden's nodes of the DIPs noted.  Significant effusions of the bilateral PIPs with tenderness.    Labs:  7/2024  Uric acid 5.5   lupus anticoagulant, anticardiolipin IgG/IgM, beta-2 glycoprotein IgG/IgM negative  dsDNA 16.6, dsDNA by IFA neg  C3/C4 wnl    12/2023  dsDNA 15.7  Uric acid 4.5  Rf/ccp neg      Lab Results   Component Value Date    WBC 5.9 07/09/2024    RBC  4.16 07/09/2024    HGB 13.8 07/09/2024    HCT 39.5 07/09/2024    .0 07/09/2024    MCV 95.0 07/09/2024    MCH 33.2 07/09/2024    MCHC 34.9 07/09/2024    RDW 14.6 07/09/2024    NEPRELIM 3.92 07/09/2024    NEPERCENT 66.6 07/09/2024    LYPERCENT 20.5 07/09/2024    MOPERCENT 9.3 07/09/2024    EOPERCENT 2.4 07/09/2024    BAPERCENT 0.7 07/09/2024    NE 3.92 07/09/2024    LYMABS 1.21 07/09/2024    MOABSO 0.55 07/09/2024    EOABSO 0.14 07/09/2024    BAABSO 0.04 07/09/2024     Lab Results   Component Value Date     (H) 07/09/2024    BUN 12 07/09/2024    BUNCREA 14.6 03/05/2021    CREATSERUM 0.98 07/09/2024    ANIONGAP 6 07/09/2024    GFRNAA 84 07/01/2022    GFRAA 97 07/01/2022    CA 9.6 07/09/2024    OSMOCALC 276 07/09/2024    ALKPHO 105 07/09/2024    AST 25 07/09/2024    ALT 24 07/09/2024    BILT 0.8 07/09/2024    TP 7.7 07/09/2024    ALB 4.0 07/09/2024    GLOBULIN 3.7 07/09/2024    AGRATIO 1.7 05/16/2013     (L) 07/09/2024    K 4.4 07/09/2024     07/09/2024    CO2 27.0 07/09/2024         No results found for: \"ANATI\", \"DRISS\", \"ANAS\", \"ANASCRN\", \"ANASCRNRFLX\", \"CHONG\"  No results found for: \"SSA\", \"SSAUR\", \"ANTISSA\", \"SSA52\", \"SSA60\", \"SSADD\", \"SSB\", \"ANTISSB\"  Lab Results   Component Value Date    DSDNA <10 07/24/2024     No results found for: \"SCL70\", \"SCL\", \"GAGPIJY43\"  Lab Results   Component Value Date    C3 118.0 07/24/2024    C4 25.7 07/24/2024     Lab Results   Component Value Date    DRVVT Negative 07/24/2024    Q7HG8FBXGO 1.3 07/24/2024    A2TB5IEWML <2.4 07/24/2024     Lab Results   Component Value Date    CARDIOLIPIGG 1.8 07/24/2024    CARDIOLIPIGM 2.4 07/24/2024       Additional Labs:    Radiology:  2019: XR hands c/w OA    Radiology review:  CT HAND LEFT (CPT=73200)    Result Date: 8/9/2024  CONCLUSION:   1. Evidence of uric acid deposition consistent with gout most pronounced in the 2nd through 4th MCP joints and the 4th PIP joint.  2. There is evidence of secondary osteoarthritis most  pronounced in the 2nd MCP joint and the 4th PIP joint.   LOCATION:  [facility]   Dictated by (CST): Kasi Amaya MD on 8/09/2024 at 4:24 PM     Finalized by (CST): Kasi Amaya MD on 8/09/2024 at 4:31 PM      =====================================================================================================  Assessment and Plan  Assessment:  1. Idiopathic chronic gout with tophus, unspecified site    2. Other secondary osteoarthritis of both feet    3. Bilateral hand pain    4. Primary osteoarthritis of both hands    5. 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  Discussed gout is often caused by diminished renal handling of urate, resultant hyperuricemia, and subsequent deposition of monosodium urate crystals into articular structures.  Management of gouty arthropathy is twofold: 1) treatment of acute gout flares with NSAIDs, colchicine, corticosteroids, and/or anakinra; 2) dissolution of MSU crystals by urate lowering therapy (ULT).     #Chronic musculoskeletal pain, osteoarthritis: S/p bilateral TKA, lumbar fusion    #Elevated dsDNA antibody but no clinical features at this point that I can directly tied to systemic autoimmune disease such as systemic lupus erythematosus.  -Chronic left shin rash of unclear etiology  -Bilateral hand osteoarthritis with significant PIP effusions likely represents a combination of hand osteoarthritis and potentially an additional inflammatory arthropathy.  Exam less consistent with SLE arthritis.  ?  Plan:  Allopurinol uptitration:  1 tablet (100 mg total) daily for 30 days  THEN 2 tablets (200 mg total) daily for 30 days  THEN 3 tablets (300 mg total) daily. After 15 days of being on  this dose, repeat bloodwork.     Colchicine (to prevent gout flares while starting on allopurinol). Generally, you will need to be on colchicine for 6-12 months, when the risk of gout flares is highest during allopurinol initiation.  Colchicine 0.6 mg tab: daily     Prednisone taper given (only as needed for gout flares):    6 tabs (30 mg) daily for 2 days  THEN 4 tabs (20 mg) daily for 2 days,   THEN 3 tabs (15 mg) daily for 2 day  THEN 2 tabs (10 mg ) daily for 2 days  THEN 1 tablet (5 mg total) daily for 2 days.  ==============================================================================================================    -Will send colchicine prescription here.   Check out the following pharmacy.  It may save you some money.  [facility]. Phone: [phone]  [email or web address]    RTC 4 months     Diagnoses and all orders for this visit:    Idiopathic chronic gout with tophus, unspecified site  -     allopurinol 100 MG Oral Tab; Take 1 tablet (100 mg total) by mouth daily for 30 days, THEN 2 tablets (200 mg total) daily for 30 days, THEN 3 tablets (300 mg total) daily.  -     colchicine 0.6 MG Oral Tab; Take 1 tablet (0.6 mg total) by mouth daily.  -     Physical Therapy Referral - [facility]  -     predniSONE 5 MG Oral Tab; Take 6 tablets (30 mg total) by mouth daily for 2 days, THEN 4 tablets (20 mg total) daily for 2 days, THEN 3 tablets (15 mg total) daily for 2 days, THEN 2 tablets (10 mg total) daily for 2 days, THEN 1 tablet (5 mg total) daily for 2 days.  -     CK (Creatine Kinase) (Not Creatinine); Future  -     Comp Metabolic Panel (14); Future  -     Uric Acid; Future    Other secondary osteoarthritis of both feet  -     allopurinol 100 MG Oral Tab; Take 1 tablet (100 mg total) by mouth daily for 30 days, THEN 2 tablets (200 mg total) daily for 30 days, THEN 3 tablets (300 mg total) daily.  -     colchicine 0.6 MG Oral Tab; Take 1 tablet (0.6 mg total) by mouth daily.  -      Physical Therapy Referral - [facility]  -     predniSONE 5 MG Oral Tab; Take 6 tablets (30 mg total) by mouth daily for 2 days, THEN 4 tablets (20 mg total) daily for 2 days, THEN 3 tablets (15 mg total) daily for 2 days, THEN 2 tablets (10 mg total) daily for 2 days, THEN 1 tablet (5 mg total) daily for 2 days.  -     CK (Creatine Kinase) (Not Creatinine); Future  -     Comp Metabolic Panel (14); Future  -     Uric Acid; Future    Bilateral hand pain  -     allopurinol 100 MG Oral Tab; Take 1 tablet (100 mg total) by mouth daily for 30 days, THEN 2 tablets (200 mg total) daily for 30 days, THEN 3 tablets (300 mg total) daily.  -     colchicine 0.6 MG Oral Tab; Take 1 tablet (0.6 mg total) by mouth daily.  -     Physical Therapy Referral - [facility]  -     predniSONE 5 MG Oral Tab; Take 6 tablets (30 mg total) by mouth daily for 2 days, THEN 4 tablets (20 mg total) daily for 2 days, THEN 3 tablets (15 mg total) daily for 2 days, THEN 2 tablets (10 mg total) daily for 2 days, THEN 1 tablet (5 mg total) daily for 2 days.    Primary osteoarthritis of both hands  -     allopurinol 100 MG Oral Tab; Take 1 tablet (100 mg total) by mouth daily for 30 days, THEN 2 tablets (200 mg total) daily for 30 days, THEN 3 tablets (300 mg total) daily.  -     colchicine 0.6 MG Oral Tab; Take 1 tablet (0.6 mg total) by mouth daily.  -     Physical Therapy Referral - [facility]  -     predniSONE 5 MG Oral Tab; Take 6 tablets (30 mg total) by mouth daily for 2 days, THEN 4 tablets (20 mg total) daily for 2 days, THEN 3 tablets (15 mg total) daily for 2 days, THEN 2 tablets (10 mg total) daily for 2 days, THEN 1 tablet (5 mg total) daily for 2 days.  -     CK (Creatine Kinase) (Not Creatinine); Future  -     Comp Metabolic Panel (14); Future  -     Uric Acid; Future    Therapeutic drug monitoring  -     CK (Creatine Kinase) (Not Creatinine); Future  -     Comp Metabolic Panel (14); Future  -     Uric Acid;  Future          Return in about 4 months (around 12/15/2024).      The above plan of care, diagnosis, orders, and follow-up were discussed with the patient. Questions related to this recommended plan of care were answered.    Thank you for referring this delightful patient to me. Please feel free to contact me with any questions.     This report was performed utilizing speech recognition software technology. Despite proofreading, speech recognition errors could escape detection. If a word or phrase is confusing or out of context, please do not hesitate to call for   clarification.       Kind regards      Slade Mcgarry MD  [facility]

## 2024-09-11 ENCOUNTER — APPOINTMENT (OUTPATIENT)
Dept: DERMATOLOGY | Age: 74
End: 2024-09-11

## 2024-09-19 ENCOUNTER — TELEPHONE (OUTPATIENT)
Dept: PHYSICAL THERAPY | Facility: HOSPITAL | Age: 74
End: 2024-09-19

## 2024-09-19 NOTE — TELEPHONE ENCOUNTER
Left message for patient. In late October my schedule is changing a bit, so her last 3 appointments will need to be shifted. Currently I moved them from 12:30 to 11:30. I left my direct line for call back requesting her to confirm if these work or if we need to move them, I have plenty of other openings those days at present.

## 2024-10-19 DIAGNOSIS — I10 ESSENTIAL HYPERTENSION, BENIGN: ICD-10-CM

## 2024-10-23 ENCOUNTER — PATIENT MESSAGE (OUTPATIENT)
Dept: INTERNAL MEDICINE CLINIC | Facility: CLINIC | Age: 74
End: 2024-10-23

## 2024-10-23 DIAGNOSIS — E66.811 OBESITY (BMI 30.0-34.9): ICD-10-CM

## 2024-10-23 DIAGNOSIS — Z51.81 ENCOUNTER FOR THERAPEUTIC DRUG MONITORING: ICD-10-CM

## 2024-10-23 RX ORDER — SEMAGLUTIDE 1.34 MG/ML
1 INJECTION, SOLUTION SUBCUTANEOUS WEEKLY
Qty: 9 ML | Refills: 0 | Status: SHIPPED | OUTPATIENT
Start: 2024-10-23

## 2024-10-23 NOTE — TELEPHONE ENCOUNTER
Requesting ozempic through [facility]  LOV: 6/25/24  RTC: 5 months  Last Relevant Labs: na  Filled: 6/25/24 #9 ml with 0 refills    1/14/2025  2:00 PM Dee Farah APRN [facility]   2/24/2025  2:40 PM Dee Farah APRN [facility]

## 2024-10-24 RX ORDER — AMLODIPINE AND OLMESARTAN MEDOXOMIL 5; 40 MG/1; MG/1
1 TABLET ORAL DAILY
Qty: 90 TABLET | Refills: 0 | Status: SHIPPED | OUTPATIENT
Start: 2024-10-24

## 2024-10-24 NOTE — TELEPHONE ENCOUNTER
Refill Failed Protocol:     Pt requesting refill of   Requested Prescriptions     Pending Prescriptions Disp Refills    amLODIPine-Olmesartan 5-40 MG Oral Tab 90 tablet 0     Sig: Take 1 tablet by mouth daily.     Last Time Medication was Filled:  7/09/2024 90 days 1 refill    Last Office Visit with Provider: 7/9/2024   Encounter for annual health examination (Primary)  2. Essential hypertension, benign  -     amLODIPine-Olmesartan; Take 1 tablet by mouth daily.  Dispense: 90 tablet; Refill: 1  -     Detailed, Mod Complex (48734)  -     Comp Metabolic Panel (14); Future; Expected date: 07/09/2024  -     Comp Metabolic Panel (14); Future; Expected date: 01/09/2025  -     Comp Metabolic Panel (14)  Controlled  Encouraged 150-130mins aerobic exercise weekly  <2g Na diet daily and follow Mediterranean diet  Weight loss if overweight  Home b/p monitoring  goal <140/90- 85% of time, optimal 110-120/70-80 bring readings and cuff to each visit.  Continue amlodipine olmesartan  Labs and OV q 6 months    Unable to approve refill, patient failed protocol due to last blood pressure being elevated. Routed refill request to provider for review.    Last BP reading was 148/70    Appt. scheduled on 12/06/2024

## 2024-11-05 ENCOUNTER — APPOINTMENT (OUTPATIENT)
Dept: DERMATOLOGY | Age: 74
End: 2024-11-05

## 2024-11-05 DIAGNOSIS — I87.2 STASIS DERMATITIS: Primary | ICD-10-CM

## 2024-11-05 DIAGNOSIS — L30.9 ECZEMA, UNSPECIFIED TYPE: ICD-10-CM

## 2024-11-05 RX ORDER — TRIAMCINOLONE ACETONIDE 40 MG/ML
60 INJECTION, SUSPENSION INTRA-ARTICULAR; INTRAMUSCULAR
Status: DISCONTINUED | OUTPATIENT
Start: 2024-11-05 | End: 2024-11-05 | Stop reason: CLARIF

## 2024-11-05 RX ORDER — BETAMETHASONE DIPROPIONATE 0.5 MG/G
LOTION TOPICAL
Qty: 120 ML | Refills: 2 | Status: SHIPPED | OUTPATIENT
Start: 2024-11-05

## 2024-11-05 RX ADMIN — TRIAMCINOLONE ACETONIDE 60 MG: 40 INJECTION, SUSPENSION INTRA-ARTICULAR; INTRAMUSCULAR at 10:16

## 2024-11-06 DIAGNOSIS — M19.042 PRIMARY OSTEOARTHRITIS OF BOTH HANDS: ICD-10-CM

## 2024-11-06 DIAGNOSIS — M19.041 PRIMARY OSTEOARTHRITIS OF BOTH HANDS: ICD-10-CM

## 2024-11-07 ENCOUNTER — PATIENT MESSAGE (OUTPATIENT)
Dept: INTERNAL MEDICINE CLINIC | Facility: CLINIC | Age: 74
End: 2024-11-07

## 2024-11-08 RX ORDER — NAPROXEN 500 MG/1
TABLET ORAL
Qty: 60 TABLET | Refills: 0 | Status: SHIPPED | OUTPATIENT
Start: 2024-11-08

## 2024-11-08 NOTE — TELEPHONE ENCOUNTER
Pt requesting refill of   Requested Prescriptions     Pending Prescriptions Disp Refills    NAPROXEN 500 MG Oral Tab [Pharmacy Med Name: NAPROXEN 500MG TABLETS] 60 tablet 0     Sig: TAKE 1 TABLET(500 MG) BY MOUTH TWICE DAILY WITH FOOD AND WATER AS NEEDED FOR ARTHRITIS OR FLARE. STOP AND. CALL IF STOMACH PAIN OR GI SIDE EFFECTS     Last Time Medication was Filled:  7/09/2024    Last Office Visit with Provider: 7/09/2024    Appt scheduled on 12/6/2024

## 2024-12-16 ENCOUNTER — OFFICE VISIT (OUTPATIENT)
Dept: INTERNAL MEDICINE CLINIC | Facility: CLINIC | Age: 74
End: 2024-12-16
Payer: MEDICARE

## 2024-12-16 VITALS
HEIGHT: 60 IN | BODY MASS INDEX: 36.52 KG/M2 | WEIGHT: 186 LBS | DIASTOLIC BLOOD PRESSURE: 70 MMHG | HEART RATE: 84 BPM | SYSTOLIC BLOOD PRESSURE: 130 MMHG | OXYGEN SATURATION: 99 % | RESPIRATION RATE: 16 BRPM

## 2024-12-16 DIAGNOSIS — I10 ESSENTIAL HYPERTENSION, BENIGN: ICD-10-CM

## 2024-12-16 DIAGNOSIS — E53.8 VITAMIN B12 DEFICIENCY: ICD-10-CM

## 2024-12-16 DIAGNOSIS — G89.29 CHRONIC MIDLINE LOW BACK PAIN WITHOUT SCIATICA: ICD-10-CM

## 2024-12-16 DIAGNOSIS — E66.811 OBESITY (BMI 30.0-34.9): ICD-10-CM

## 2024-12-16 DIAGNOSIS — M54.50 CHRONIC MIDLINE LOW BACK PAIN WITHOUT SCIATICA: ICD-10-CM

## 2024-12-16 DIAGNOSIS — Z51.81 ENCOUNTER FOR THERAPEUTIC DRUG MONITORING: Primary | ICD-10-CM

## 2024-12-16 DIAGNOSIS — E55.9 VITAMIN D DEFICIENCY: ICD-10-CM

## 2024-12-16 PROCEDURE — 99214 OFFICE O/P EST MOD 30 MIN: CPT | Performed by: NURSE PRACTITIONER

## 2024-12-16 RX ORDER — AMOXICILLIN 500 MG/1
500 CAPSULE ORAL AS DIRECTED
COMMUNITY
Start: 2024-11-05

## 2024-12-16 RX ORDER — BETAMETHASONE DIPROPIONATE 0.5 MG/G
LOTION TOPICAL
COMMUNITY
Start: 2024-11-06

## 2024-12-16 NOTE — PATIENT INSTRUCTIONS
Next steps:  1.  Fill your prescribed medication and take as discussed and prescribed: ozempic 0.25mg x 2 weeks and then increase 0.5mg weekly (until you run out)--> then start rybelsus 14mg (when you get it)   2.  Schedule a personal nutrition consultation with one of our registered dieticians     Please try to work on the following dietary changes:  Daily protein recommendation to start:  grams  Daily carbohydrate: <100g  Daily calories: 1,300  1.  Drink water with meals and throughout the day, cut down on soda and/or juice if consumed. Consider flavored water options like Bubbly, Spindrift, Hint and Marynan.  2.  Eat breakfast daily and focus on having protein with each meal, examples include: greek yogurt, cottage cheese, hard boiled egg, whole grain toast with peanut butter.   3.  Reduce refined carbohydrates and sugars which includes items such as sweets, as well as rice, pasta, and bread and make sure to choose whole grain options when having them with just 1 serving per meal about the size of your inner palm.  4.  Consume non starchy veggies daily working towards making them a good 50% of your daily food intake. Add them to lunch and dinner consistently.  5.  Start a daily probiotic: VSL#3 is recommended, (order on line at www.vsl3.com). Take 1 capsule daily with water for 30 days, then reduce to 1 every other day (this will reduce the cost). Capsules can be left out for 2 weeks, but then must be refrigerated.      Please download saskia My Fitness Pal, LoseIt! Or Net Diary to monitor daily dietary intake and you will be able to see if you are eating the right amount of calories or too much or too little which would hinder weight loss. Additionally this will help to see your daily carbohydrate and protein intake. When you set the saskia up choose 1-2 lbs/week as a goal.  Keeping a paper food journal is an option as well to remain accountable for your choices- this is the start to mindful eating! A low  calorie diet has been consistently shown to support weight loss.     Continue or start exercising to help establish a routine. If not already exercising begin with 1 day and progress as able with long-term goal of 30 minutes 5 days a week at a minimum.     Meditation daily can help manage and control stress. Chronic stress can make weight loss difficult.  Exercising is one way to help with stress, but meditation using the CALM Clare or another comparable alternative can be done in your home or place of work with little time commitment. This Clare can also help work on behavior change and improve sleep. Check out the segment under Calm Masterclass and listen to The 4 Pillars of Health. A great way to begin learning about the foundation of lifestyle with practical tips to use in your every day.     Check out www.yourweightmatters.org blog for continued daily support and education along this weight loss journey!    Patient Resources:     Personal Training/Fitness Classes/Health Coaching     [facility] @ [email or web address] Full fitness center with group fitness and personal training. Discount available as client of [facility].  Health Coaching and Personal Training with Adali Leo at our [facility]- individual weekly coaching with option to add personal training and small group fitness classes targeted at weight loss- [phone] and/or email @ [email or web address]  360[employer] [place] [email or web address] Group Fitness [phone] and/or email Nannette at [email or web address]  [facility] Fitness Centers with multiple locations: Curate.Us (www.Glass), Eat The Frog Fitness (www.[email or web address].Oomnitza), Fit Body Bootcamp (www.BIlprospektbodybootODK Mediap.Oomnitza), Dyn Fitness (www.6th Sense Analytics), The Exercise  (www.exercisecoach.Oomnitza)     Online Fitness  Fitness  on Utube  Fit  in 10 DVD series- www.rhmre70UWM.com  Sit and Be Fit - Chair exercise series Www.sitandbefit.org  Hip Hop Fit with Dominic Martínez at www.hiphopfit.net     Apps for on the Go Fitness  Mchenry 7 Minute Workout (orange box with white 7) - free on the go HIIT training clare  Peloton Clare @ www.onepeloton.com     Nutrition Trackers and Tools  LoseIT! And My Fitness Pal apps and on line for tracking nutrition  NOOM - virtual health coaching  FitFoundation (healthy meals on the go) in [place] @ www.hnsznpxgxbbzh6n.AppHarbor  Bistro MD @ www.bistromd.com and Rravlh52 (keto and low carb plans recommended) @ [email or web address]­dategtazkn97.com, Metabolic Meals @ www.Neos TherapeuticsMetabolicMeals.com - individual prepared meals to go  Gobble, Blue Apron, Home , Every Plate, Sunbasket- on line meal delivery programs for preparation at home  Meal Village in [place] for homemade meals to go @ www.mealvillage.AppHarbor  Diet Doctor @ www.dietdoctor.com - low carb swaps  Yummly - meal prep and planning clare (www.yummly.com)     Stress Management/Behavior/Mindful Eating  CALM meditation clare (www.calm.com)  Headspace  Am I Hungry? Mindful eating virtual  clare  Www.yourweightmatters.org - Obesity Action Coalition sponsored Blog posts daily  Motivation clare (black box with white \")- daily supportive messages sent to your phone     Books/Video Education/Podcasts  Mindless Eating by Etienne Bae  Why We Get Sick by Sanket Esqueda (a book about insulin resistance)  Atomic Habits by Itz Lynch (a book about taking small steps to promote greater behavior change)   Can't Hurt Me by Ashvin Benitez (a book exploring the power of discipline in achieving your goals)  The End of Dieting: How to Live for Life by Dr. Doni Howard M.D. or listen to The [place] Podcast Episode 63: Understanding \"Nutritarian\" Eating w/Dr. Doni Howard  Your Body in Balance: The New Science of Food, Hormones, and Health by Dr. Schuyler Bob  The Menopause Diet Plan by Vane Lau and Deloris  Ga  The Complete Guide to fasting by Dr. Chaudhry  Sugar, Salt & Fat by Karey Talbert, Ph.D, R.D.  Weight Loss Surgery Will Not Treat Food Addiction by Lindsey Gee Ph.D  The Game Changers- Netflix Documentary on plant based nutrition  Fed Up - documentary about obesity (Free on Utube)  The Truth About Sugar - documentary on sugar (Free on Utube, https://youtu.be/4Z4hjgkBS4b)  The Dr. Segundo T5 Wellness Plan by Dr. Gonzalo Segundo MD  Fitlosophy Fitspiration - journal to better health (found at [employer] in fitness aisle)  What Happened to You?- a look at the impact trauma has on behavior written by Amrik Herrera and Dr. Ulises Daly  Whole Again by Branden Jung - discovering your true self after trauma  Aurea Pinon talk on Netflix, The Call to Courage  Podcasts: The Exam Room by the Physician's Committee, Nutrition Facts by Dr. Pagan    We are here to support you with weight loss, but please remember that you still need your primary care provider for your routine health maintenance.

## 2024-12-16 NOTE — PROGRESS NOTES
HISTORY OF PRESENT ILLNESS  Chief Complaint   Patient presents with    Weight Check     Down 6     Ciarra Sethi is a 74 year old female here for follow up with medical weight loss program for the treatment of overweight, obesity, or morbid obesity.     Down 6 lbs (f/u from 6/2024)   Non-compliant on rybelsus 14mg (wasn't able to get it (since strike in [place])- last took ozempic 1 month ago    Was down to #182 lbs on home scale   Is cooking more meals at home,  has been having some health issues   Exercise/Activity: not doing anything routine as far as exercise  Nutrition: eating regular meals, +protein, minimal veggies. not tracking reports  Meals out per week on average: 0  Stress is manageable   Sleep: 4 hours/night, waking up feeling tired ( how is ill and worry)    Denies chest pain, shortness of breath, dizziness, blurred vision, headache, paresthesia, nausea/vomiting.     Breakfast Lunch Dinner Snacks Fluids   Reviewed              Wt Readings from Last 6 Encounters:   12/16/24 186 lb (84.4 kg)   08/15/24 191 lb (86.6 kg)   07/23/24 191 lb (86.6 kg)   07/09/24 193 lb 3.2 oz (87.6 kg)   06/25/24 192 lb (87.1 kg)   05/15/24 188 lb (85.3 kg)          REVIEW OF SYSTEMS  GENERAL: feels well otherwise, denied any fevers chills or night sweats   LUNGS: denies shortness of breath  CARDIOVASCULAR: denies chest pain  GI: denies abdominal pain  MUSCULOSKELETAL: +back pain, +joint pains   PSYCH: denies change in behavior or mood, denies feeling sad or depressed    EXAM  /70   Pulse 84   Resp 16   Ht 5' (1.524 m)   Wt 186 lb (84.4 kg)   SpO2 99%   BMI 36.33 kg/m²       GENERAL: well developed, well nourished, in no apparent distress, A/O x3  SKIN: no rashes, no suspicious lesions  HEENT: atraumatic, normocephalic, OP-clear, PERRLA  NECK: supple, no adenopathy  LUNGS: CTA in all fields, breathing non labored  CARDIO: RRR without murmur  GI: +BS, NT/ND, no masses or HSM  EXTREMITIES: no  cyanosis, no clubbing, no edema    Lab Results   Component Value Date     (H) 07/09/2024    BUN 12 07/09/2024    BUNCREA 14.6 03/05/2021    CREATSERUM 0.98 07/09/2024    ANIONGAP 6 07/09/2024    GFRNAA 84 07/01/2022    GFRAA 97 07/01/2022    CA 9.6 07/09/2024    OSMOCALC 276 07/09/2024    ALKPHO 105 07/09/2024    AST 25 07/09/2024    ALT 24 07/09/2024    BILT 0.8 07/09/2024    TP 7.7 07/09/2024    ALB 4.0 07/09/2024    GLOBULIN 3.7 07/09/2024    AGRATIO 1.7 05/16/2013     (L) 07/09/2024    K 4.4 07/09/2024     07/09/2024    CO2 27.0 07/09/2024     Lab Results   Component Value Date    EAG 91 07/09/2024    A1C 4.8 07/09/2024     Lab Results   Component Value Date    CHOLEST 183.00 12/11/2019    TRIG 41.00 12/11/2019    HDL 71 12/11/2019     12/11/2019    VLDL 19 05/16/2013     Lab Results   Component Value Date    B12 427 06/04/2019     Lab Results   Component Value Date    VITD 34.22 09/25/2018       Medications Ordered Prior to Encounter[1]    ASSESSMENT/PLAN    ICD-10-CM    1. Encounter for therapeutic drug monitoring  Z51.81       2. Obesity (BMI 30.0-34.9)  E66.811       3. Essential hypertension, benign  I10       4. Vitamin D deficiency  E55.9       5. Vitamin B12 deficiency  E53.8       6. Chronic midline low back pain without sciatica  M54.50     G89.29           PLAN   Initial Weight Data and Goal Weight Loss:  Initial consult: # 198 lbs on 12/2018  Weight Calculations  Initial Weight: 198 lbs  Initial Weight Date: 12/01/18  Today's Weight: 186 lbs  5% Goal: 9.9  10% Goal: 19.8  Total Weight Loss: 12 lbs  Total weight loss: Down 6 lbs total, Net loss 12 lbs  Will start sample of ozempic (since ran out of meds 1 month ago) and then will bridge to rybelsus  Continue with medications: rybelsus 14mg (see HPI)   --advised of side effects and adverse effects of this medication  Contradictions: has done plenity in the past, has done lomaria, topamax, and stopped contrave (due to nausea),  ozempic, compound semaglutide   Reviewed labs   Continue with vitamin d OTC  HTN  blood pressure is in office is stable - continue present management  Chronic back pain (hopefully will restart doing aqua therapy again at [facility])   Stress is high, see HPI  Wrote out macros and encouraged tracking food  Nutrition: Low carb diet, recommended to eat breakfast daily/ regular protein intake  Follow up with dietitian and psychologist as recommended.  Discussed the role of sleep and stress in weight management.  Counseled on comprehensive weight loss plan including attention to nutrition, exercise and behavior/stress management for success. See patient instruction below for more details.  Discussed strategies to overcome barriers to successful weight loss and weight maintenance  FITTE: ACSM recommendations (150-300 minutes/ week in active weight loss)   Weight Loss Consent to treat reviewed and signed.    Total time spent on chart review, pre-charting, obtaining history, counseling, and educating, reviewing labs was 30 minutes.       NOTE TO PATIENT: The 21st Century Cures Act makes clinical notes like these available to patients in the interest of transparency. Clinical notes are medical documents used by physicians and care providers to communicate with each other. These documents include medical language and terminology, abbreviations, and treatment information that may sound technical and at times possibly unfamiliar. In addition, at times, the verbiage may appear blunt or direct. These documents are one tool providers use to communicate relevant information and clinical opinions of the care providers in a way that allows common understanding of the clinical context.     There are no Patient Instructions on file for this visit.    No follow-ups on file.    Patient verbalizes understanding.    Dee Farah, LUISA             [1]   Current Outpatient Medications on File Prior to Visit   Medication Sig Dispense Refill     betamethasone dipropionate 0.05 % External Lotion APPLY TWICE DAILY STRICTLY TO THE AFFECTED AREA FOR 2 WEEKS. AVOID USE ON FACE AND BODY FOLDS.      NAPROXEN 500 MG Oral Tab TAKE 1 TABLET(500 MG) BY MOUTH TWICE DAILY WITH FOOD AND WATER AS NEEDED FOR ARTHRITIS OR FLARE. STOP AND. CALL IF STOMACH PAIN OR GI SIDE EFFECTS 60 tablet 0    amLODIPine-Olmesartan 5-40 MG Oral Tab Take 1 tablet by mouth daily. 90 tablet 0    amoxicillin 500 MG Oral Cap Take 1 capsule (500 mg total) by mouth As Directed. ONE HOUR BEFORE PROCEDURE (Patient not taking: Reported on 12/16/2024)      Semaglutide (RYBELSUS) 14 MG Oral Tab Take 14 mg by mouth daily. Take 1 tablet by mouth on an empty stomach and wait 30 min before eating or drinking anything 30 tablet 3    semaglutide (OZEMPIC, 1 MG/DOSE,) 4 MG/3ML Subcutaneous Solution Pen-injector Inject 1 mg into the skin once a week. (Patient not taking: Reported on 12/16/2024) 9 mL 0    colchicine 0.6 MG Oral Tab Take 1 tablet (0.6 mg total) by mouth daily. (Patient not taking: Reported on 12/16/2024) 90 tablet 2    NON FORMULARY Betamethasone ointment (Patient not taking: Reported on 12/16/2024)       No current facility-administered medications on file prior to visit.

## 2024-12-19 ENCOUNTER — OFFICE VISIT (OUTPATIENT)
Dept: FAMILY MEDICINE CLINIC | Facility: CLINIC | Age: 74
End: 2024-12-19
Payer: MEDICARE

## 2024-12-19 VITALS
WEIGHT: 186.38 LBS | HEART RATE: 101 BPM | SYSTOLIC BLOOD PRESSURE: 122 MMHG | TEMPERATURE: 98 F | HEIGHT: 60 IN | OXYGEN SATURATION: 97 % | DIASTOLIC BLOOD PRESSURE: 76 MMHG | RESPIRATION RATE: 20 BRPM | BODY MASS INDEX: 36.59 KG/M2

## 2024-12-19 DIAGNOSIS — R73.01 ELEVATED FASTING GLUCOSE: ICD-10-CM

## 2024-12-19 DIAGNOSIS — M19.042 PRIMARY OSTEOARTHRITIS OF BOTH HANDS: ICD-10-CM

## 2024-12-19 DIAGNOSIS — M10.9 GOUTY ARTHRITIS: ICD-10-CM

## 2024-12-19 DIAGNOSIS — M54.50 CHRONIC MIDLINE LOW BACK PAIN WITHOUT SCIATICA: ICD-10-CM

## 2024-12-19 DIAGNOSIS — M19.041 PRIMARY OSTEOARTHRITIS OF BOTH HANDS: ICD-10-CM

## 2024-12-19 DIAGNOSIS — G89.29 CHRONIC MIDLINE LOW BACK PAIN WITHOUT SCIATICA: ICD-10-CM

## 2024-12-19 DIAGNOSIS — K76.0 FATTY LIVER: ICD-10-CM

## 2024-12-19 DIAGNOSIS — M48.061 FORAMINAL STENOSIS OF LUMBAR REGION: ICD-10-CM

## 2024-12-19 DIAGNOSIS — Z87.898 HISTORY OF PREDIABETES: ICD-10-CM

## 2024-12-19 DIAGNOSIS — Z13.6 SCREENING, ISCHEMIC HEART DISEASE: ICD-10-CM

## 2024-12-19 DIAGNOSIS — R74.8 ELEVATED LIVER ENZYMES: ICD-10-CM

## 2024-12-19 DIAGNOSIS — I10 ESSENTIAL HYPERTENSION, BENIGN: Primary | ICD-10-CM

## 2024-12-19 DIAGNOSIS — E87.1 HYPONATREMIA: ICD-10-CM

## 2024-12-19 LAB
ALBUMIN SERPL-MCNC: 4.5 G/DL (ref 3.2–4.8)
ALBUMIN/GLOB SERPL: 1.6 {RATIO} (ref 1–2)
ALP LIVER SERPL-CCNC: 91 U/L
ALT SERPL-CCNC: 24 U/L
ANION GAP SERPL CALC-SCNC: 7 MMOL/L (ref 0–18)
AST SERPL-CCNC: 27 U/L (ref ?–34)
BILIRUB SERPL-MCNC: 0.6 MG/DL (ref 0.2–1.1)
BUN BLD-MCNC: 13 MG/DL (ref 9–23)
CALCIUM BLD-MCNC: 9.7 MG/DL (ref 8.7–10.4)
CHLORIDE SERPL-SCNC: 99 MMOL/L (ref 98–112)
CHOLEST SERPL-MCNC: 193 MG/DL (ref ?–200)
CO2 SERPL-SCNC: 28 MMOL/L (ref 21–32)
CREAT BLD-MCNC: 0.77 MG/DL
EGFRCR SERPLBLD CKD-EPI 2021: 80 ML/MIN/1.73M2 (ref 60–?)
EST. AVERAGE GLUCOSE BLD GHB EST-MCNC: 100 MG/DL (ref 68–126)
FASTING PATIENT LIPID ANSWER: NO
FASTING STATUS PATIENT QL REPORTED: NO
GLOBULIN PLAS-MCNC: 2.9 G/DL (ref 2–3.5)
GLUCOSE BLD-MCNC: 106 MG/DL (ref 70–99)
HBA1C MFR BLD: 5.1 % (ref ?–5.7)
HDLC SERPL-MCNC: 89 MG/DL (ref 40–59)
LDLC SERPL CALC-MCNC: 91 MG/DL (ref ?–100)
NONHDLC SERPL-MCNC: 104 MG/DL (ref ?–130)
OSMOLALITY SERPL CALC.SUM OF ELEC: 279 MOSM/KG (ref 275–295)
POTASSIUM SERPL-SCNC: 4.5 MMOL/L (ref 3.5–5.1)
PROT SERPL-MCNC: 7.4 G/DL (ref 5.7–8.2)
SODIUM SERPL-SCNC: 134 MMOL/L (ref 136–145)
TRIGL SERPL-MCNC: 69 MG/DL (ref 30–149)
VLDLC SERPL CALC-MCNC: 11 MG/DL (ref 0–30)

## 2024-12-19 PROCEDURE — 80061 LIPID PANEL: CPT | Performed by: FAMILY MEDICINE

## 2024-12-19 PROCEDURE — 80053 COMPREHEN METABOLIC PANEL: CPT | Performed by: FAMILY MEDICINE

## 2024-12-19 PROCEDURE — G2211 COMPLEX E/M VISIT ADD ON: HCPCS | Performed by: FAMILY MEDICINE

## 2024-12-19 PROCEDURE — 83036 HEMOGLOBIN GLYCOSYLATED A1C: CPT | Performed by: FAMILY MEDICINE

## 2024-12-19 PROCEDURE — 99214 OFFICE O/P EST MOD 30 MIN: CPT | Performed by: FAMILY MEDICINE

## 2024-12-19 RX ORDER — AMLODIPINE AND OLMESARTAN MEDOXOMIL 5; 40 MG/1; MG/1
1 TABLET ORAL DAILY
Qty: 90 TABLET | Refills: 1 | Status: SHIPPED | OUTPATIENT
Start: 2024-12-19

## 2024-12-19 RX ORDER — NAPROXEN 500 MG/1
250 TABLET ORAL 2 TIMES DAILY PRN
Qty: 60 TABLET | Refills: 0 | Status: SHIPPED | OUTPATIENT
Start: 2024-12-19

## 2024-12-19 NOTE — PROGRESS NOTES
CHIEF COMPLAINT:   Chief Complaint   Patient presents with    Medication Follow-Up     6 months          HPI:     Ciarra Sethi is a 74 year old female presents for medication monitoring of blood pressure and history of prediabetes    Patient presents for recheck of hypertension. Pt has been taking medications as instructed, . denies any medication side effects,. Patient is a  s Home blood pressures-130's over 70s to 80s.  Denies any headache, dizziness, chest pain, shortness of breath or leg swelling.     Prediabetes-not on medication- off 1 month semaglutide injections due to postal strike in [place].  Trying to watch diet and eat low-carb.  Denies polyuria, polydipsia or vision changes.  Gained 10 pounds back off of semaglutide  Last A1c:  HgbA1C (%)   Date Value   12/19/2024 5.1   07/09/2024 4.8   10/27/2023 5.2         Fatty liver-- Lost 30 pounds but gained 3 back since starting weight loss clinic.  Was on compounded semaglutide but did not like giving self injections.  Working with Mag MORAN at weight loss clinic. Waiting for Rybelsus to be sent from [facility] [employer] due to cost.  Trying to follow Mediterranean diet, stopped processed food lunchmeat, stop snacking.  Making healthy lifestyle changes. Denies abdominal pain.     History of lumbar back fusion of L3/L4 by Dr. Cisneros on 5/19/2022-right back flare times several months worse with walking.  States \"it is a muscle that has flared and weakened\"-pain has occurred paravertebrally and not in the midline.  Pain does not radiate.  Not worse with bending.  Would like to return to PT since benefited from therapy previously.  Denies radiation of pain down the leg or up the back.  Denies weakness or numbness of the arms or legs.  Denies any bowel bladder incontinence or perianal anesthesia.  Has cane if needed-helped with back pain previously.      Risk of falls-patient worries during winter months if I see and falls-will injure her knee and back  and will use cane cautious.  Does not feel gait is unsteady.  Last DEXA scan in 7/13/2024 was completely normal.  No history of fractures or falls.    Hx of OA in fingers left PIP 3rd and 4th finger swollen-uses naproxen as needed and uses sparingly due to risk of GI side effects.. Unable to wear rings. Tylenol arthritis not helping.  Has appointment with rheumatology 7/26/2024, Dr. Mcgarry-suspected gouty arthritis.  Patient was started on allopurinol prednisone and colchicine for 6 to 12 months.  Patient's pain in her fingers has improved.  Gouty arthritis was confirmed with CT of left hand on 8/9/2024 there was uric acid deposition consistent with gout most pronounced in the 2nd through 4th MCP joints of the fourth PIP joint.  There is also secondary OA most pronounced in the second MCP joint and the fourth PIP joint.     Wt Readings from Last 6 Encounters:   12/19/24 186 lb 6.4 oz (84.6 kg)   12/16/24 186 lb (84.4 kg)   08/15/24 191 lb (86.6 kg)   07/23/24 191 lb (86.6 kg)   07/09/24 193 lb 3.2 oz (87.6 kg)   06/25/24 192 lb (87.1 kg)   Starting weight 213#      HISTORY:  Past Medical History:    Anesthesia complication    Arthritis    Back problem    BENIGN HYPERTENSION    Essential hypertension    High blood pressure    Hx of motion sickness    Obesity    Osteoarthritis    PONV (postoperative nausea and vomiting)    Unspecified vitamin D deficiency    Visual impairment    glasses      Past Surgical History:   Procedure Laterality Date    Back surgery  5/10/2022    Hysterectomy  1989    Knee replacement surgery  2015    left knee replacement surgery in August 2015    Other      cyst removal on left 3rd toe- local anesthesia    Other  2012,2013    both wrists reconstructed    Other  2014,2015    knee replacements    Other surgical history  2012,2013    LEFT AND RIGHT wrist re construction     Spinal fusion  05/2022    Total abdom hysterectomy      Partial.      Family History   Problem Relation Age of Onset     Arthritis Mother     High Blood Pressure Mother     Heart Disease Mother     Heart Disorder Mother 50        angina    Psychiatric Mother         DEMENTIA    Hypertension Daughter     Other (parkinson) Son     Hypertension Son     Cancer Father 56        gallbladder    Arthritis Sister     Hypertension Sister     Arthritis Brother         x2 brothers    Hypertension Brother     No Known Problems Maternal Grandmother     No Known Problems Maternal Grandfather     No Known Problems Paternal Grandmother     No Known Problems Paternal Grandfather     Cancer Brother     Arthritis Brother     Arthritis Sister     Arthritis Sister     Cancer Brother       Social History:   Social History     Socioeconomic History    Marital status:    Tobacco Use    Smoking status: Never     Passive exposure: Never    Smokeless tobacco: Never   Vaping Use    Vaping status: Never Used   Substance and Sexual Activity    Alcohol use: Yes     Alcohol/week: 2.0 standard drinks of alcohol     Types: 2 Glasses of wine per week     Comment: 2 glasses a day    Drug use: No    Sexual activity: Not Currently     Birth control/protection: Hysterectomy   Other Topics Concern    Caffeine Concern No    Exercise Yes    Seat Belt No    Special Diet No    Stress Concern No    Weight Concern Yes   Social History Narrative    : yes    Children: 3    Exercise: walks, Fit    Employment: teaches and runs a [employer]    Caffeine intake: occ         Social Drivers of Health      [facility], [facility]    Social Connections    Received from [facility], [facility]    Housing Stability        Medications (Active prior to today's visit):  Current Outpatient Medications   Medication Sig Dispense Refill    betamethasone dipropionate 0.05 % External Lotion APPLY TWICE DAILY STRICTLY TO THE AFFECTED AREA FOR 2 WEEKS. AVOID USE ON FACE AND BODY FOLDS.      Semaglutide  (RYBELSUS) 14 MG Oral Tab Take 14 mg by mouth daily. Take 1 tablet by mouth on an empty stomach and wait 30 min before eating or drinking anything 30 tablet 3    NAPROXEN 500 MG Oral Tab TAKE 1 TABLET(500 MG) BY MOUTH TWICE DAILY WITH FOOD AND WATER AS NEEDED FOR ARTHRITIS OR FLARE. STOP AND. CALL IF STOMACH PAIN OR GI SIDE EFFECTS 60 tablet 0    amLODIPine-Olmesartan 5-40 MG Oral Tab Take 1 tablet by mouth daily. 90 tablet 0       Allergies:  Allergies[1]    PSFH elements reviewed from today and agreed except as otherwise stated in HPI.  PHYSICAL EXAM:   /76   Pulse 101   Temp 97.5 °F (36.4 °C) (Temporal)   Resp 20   Ht 5' (1.524 m)   Wt 186 lb 6.4 oz (84.6 kg)   SpO2 97%   BMI 36.40 kg/m²   Vital signs reviewed.Appears stated age, well groomed.  Physical Exam   GENERAL: well developed, well nourished,in no apparent distress  EYES; PERRLA, EOM-i B/L. Sclera clear and non icteric bilateral  SKIN: no rashes,no suspicious lesions  HEENT: atraumatic, normocephalic  NECK: supple,no adenopathy,no bruits, no JVD  LUNGS: clear to auscultation bilateral. No RRW. Good inspiratory and expiratory effort  CARDIO: RRR without murmur, clear S1, S2  VS: no carotid artery bruit bilateral.    GI: good BS's,no masses,No HSM or tenderness.   EXTREMITIES: no cyanosis, clubbing or edema, bilateral. No calf tenderness  Musculoskeletal: Tenderness is L3-L5 paravertebral bilateral.  F ROM.  Negative SLR bilateral.  Neuro: Normal gait.  Upper and lower extremity DTRs are +1/4 bilateral equal and symmetric.  Sensation is intact.  Able to walk heel-to-toe without any difficulty.  No foot drop.  LABS     No visits with results within 2 Month(s) from this visit.   Latest known visit with results is:   [facility] Lab Encounter on 07/24/2024   Component Date Value    Uric Acid 07/24/2024 5.5     Complement C3 07/24/2024 118.0     Complement C4 07/24/2024 25.7     Sed Rate 07/24/2024 13     C-Reactive Protein 07/24/2024 <0.40     Urine Color  07/24/2024 Dark-Yellow     Clarity Urine 07/24/2024 Ex.Turbid (A)     Spec Gravity 07/24/2024 1.023     Glucose Urine 07/24/2024 Normal     Bilirubin Urine 07/24/2024 Negative     Ketones Urine 07/24/2024 Negative     Blood Urine 07/24/2024 Negative     pH Urine 07/24/2024 5.5     Protein Urine 07/24/2024 50 (A)     Urobilinogen Urine 07/24/2024 Normal     Nitrite Urine 07/24/2024 Negative     Leukocyte Esterase Urine 07/24/2024 250 (A)     WBC Urine 07/24/2024 6-10 (A)     RBC Urine 07/24/2024 0-2     Bacteria Urine 07/24/2024 None Seen     Squamous Epi. Cells 07/24/2024 Few (A)     Renal Tubular Epithelial* 07/24/2024 None Seen     Transitional Cells 07/24/2024 None Seen     Ca Oxalate Crystals 07/24/2024 Many (A)     Yeast Urine 07/24/2024 None Seen     Anti Double Strand DNA 07/24/2024 <10     Anti-dsDNA antibody 07/24/2024 16.6 (H)     Cardiolipin IgG Antibody 07/24/2024 1.8     Cardiolipin IgM Antibody 07/24/2024 2.4     Beta 2 Glycoprotein 1 Ab* 07/24/2024 1.3     Beta 2 Glycoprotein 1 Ab* 07/24/2024 <2.4     Pathologist Interpretati* 07/24/2024 No evidence of a lupus anticoagulant, antiphospholipid antibodies or antibodies against phospholipid binding proteins.    Reviewed by Barrie Vieira M.D. Pathology 07/26/24 at 9:47 AM     PT (Lupus) 07/24/2024 13.1     PTT (Lupus) 07/24/2024 26.4     Hexagonal Phase Staclot * 07/24/2024 Negative     DRVVT Ratio 07/24/2024 0.98     DRVVT Lupus Anticoagulant 07/24/2024 Negative     INR (Lupus) 07/24/2024 0.99     PTT (Hepzyme) 07/24/2024      StaClot LA Delta 07/24/2024 2.90     Urine Culture 07/24/2024 <10,000 cfu/ml Multiple species present- probable contamination.       REVIEWED THIS VISIT  ASSESSMENT/PLAN:   74 year old female with    1. Essential hypertension, benign  - amLODIPine-Olmesartan 5-40 MG Oral Tab; Take 1 tablet by mouth daily.  Dispense: 90 tablet; Refill: 1  - Comp Metabolic Panel (14)  Controlled  Encouraged 150-130mins aerobic exercise weekly  <2g Na  diet daily and follow Mediterranean diet  Weight loss if overweight  Home b/p monitoring  goal <140/90- 85% of time, optimal 110-120/70-80 bring readings and cuff to each visit.  Continue amlodipine and olmesartan combination pill  Labs and OV q 6 months    . History of prediabetes  3. Elevated fasting glucose  - Hemoglobin A1C [E]  On semaglutide for weight loss  In normal range A1c  Encouraged to continue weight loss, a low-carb diet and walk 30 minutes daily  A1c every 6 to 12 months    4. Hyponatremia  - Comp Metabolic Panel (14)  Controlled  Sodium is 133  Will monitor every 6 to 12 months    5. Fatty liver  6. Elevated liver enzymes  - Comp Metabolic Panel (14)  LFTs normalized with weight loss  Patient to continue with weight loss needs at least 10% of weight    7. Gouty arthritis  8. Primary osteoarthritis of both hands  - naproxen 500 MG Oral Tab; Take 0.5 tablets (250 mg total) by mouth 2 (two) times daily as needed (for back and hand pain). Take with food and water. Stop and call if GI side effects  Dispense: 60 tablet; Refill: 0  Limits naproxen  On allopurinol and colchicine  Pain is controlled  Follow-up with Dr. Mcgarry as recommended    9. Chronic midline low back pain without sciatica  0. Foraminal stenosis of lumbar region  - Physical Therapy Referral - External  Neurovascular intact  Start physical therapy  If not improving may need t evaluation with spine orthopedics    11. Screening, ischemic heart disease  - Lipid Panel    The patient and provider have a longitudinal relationship to address/treat the serious or complex condition as stated in this encounter.    Meds This Visit:  Requested Prescriptions     Pending Prescriptions Disp Refills    amLODIPine-Olmesartan 5-40 MG Oral Tab 90 tablet 0     Sig: Take 1 tablet by mouth daily.    naproxen 500 MG Oral Tab 60 tablet 0       Health Maintenance:  Health Maintenance   Topic Date Due    Colorectal Cancer Screening  02/05/2023    Annual Physical   07/09/2025    Influenza Vaccine  Completed    DEXA Scan  Completed    Annual Depression Screening  Completed    Fall Risk Screening (Annual)  Completed    Pneumococcal Vaccine: 65+ Years  Completed    Zoster Vaccines  Completed    COVID-19 Vaccine  Completed    Mammogram  Discontinued         Patient/Caregiver Education: Patient/Caregiver Education: There are no barriers to learning. Medical education done.   Outcome: Patient verbalizes understanding. Patient is notified to call with any questions, comp lications, allergies, or worsening or changing symptoms.  Patient is to call with any side effects or complications from the treatments as a result of today.     Problem List:     Patient Active Problem List   Diagnosis    Essential hypertension, benign    Vitamin D deficiency    Hepatomegaly    Class 1 drug-induced obesity without serious comorbidity with body mass index (BMI) of 32.0 to 32.9 in adult    History of total bilateral knee replacement    Varicosities    Primary osteoarthritis involving multiple joints    Chronic midline low back pain without sciatica    Obesity (BMI 30-39.9)    Hyponatremia    Elevated liver enzymes    MSSA (methicillin-susceptible Staphylococcus aureus) colonization    Dermatitis    Foraminal stenosis of lumbar region    Fatty liver    Right kidney mass    Elevated fasting glucose    At risk for falls    Prediabetes    Ds DNA antibody positive    Inflammatory arthritis    Primary osteoarthritis of both hands       Imaging & Referrals:  None     12/19/2024  Yeni Beltran, DO      Patient understands plan and follow-up.  Return in about 29 weeks (around 7/10/2025) for medicare physical, medication monitoring, fasting labs AM, sooner if needed..            [1] No Known Allergies

## 2024-12-19 NOTE — PROGRESS NOTES
Patient came in for draw of ordered non fasting labs. Patient drawn out of right arm  AC, x 1 attempt and tolerated well.  1 SST ( green) 1 Lavender  tube drawn.

## 2024-12-26 ENCOUNTER — OFFICE VISIT (OUTPATIENT)
Dept: RHEUMATOLOGY | Facility: CLINIC | Age: 74
End: 2024-12-26
Payer: MEDICARE

## 2024-12-26 VITALS
SYSTOLIC BLOOD PRESSURE: 138 MMHG | WEIGHT: 187 LBS | HEIGHT: 66 IN | OXYGEN SATURATION: 98 % | TEMPERATURE: 97 F | RESPIRATION RATE: 18 BRPM | DIASTOLIC BLOOD PRESSURE: 60 MMHG | BODY MASS INDEX: 30.05 KG/M2 | HEART RATE: 96 BPM

## 2024-12-26 DIAGNOSIS — Z51.81 THERAPEUTIC DRUG MONITORING: ICD-10-CM

## 2024-12-26 DIAGNOSIS — R76.8 DS DNA ANTIBODY POSITIVE: ICD-10-CM

## 2024-12-26 DIAGNOSIS — M1A.09X1 IDIOPATHIC CHRONIC GOUT OF MULTIPLE SITES WITH TOPHUS: Primary | ICD-10-CM

## 2024-12-26 PROCEDURE — G2211 COMPLEX E/M VISIT ADD ON: HCPCS | Performed by: INTERNAL MEDICINE

## 2024-12-26 PROCEDURE — 99214 OFFICE O/P EST MOD 30 MIN: CPT | Performed by: INTERNAL MEDICINE

## 2024-12-26 RX ORDER — ALLOPURINOL 300 MG/1
300 TABLET ORAL DAILY
Qty: 90 TABLET | Refills: 3 | Status: SHIPPED | OUTPATIENT
Start: 2024-12-26 | End: 2025-03-26

## 2024-12-26 RX ORDER — COLCHICINE 0.6 MG/1
0.6 TABLET ORAL DAILY
Qty: 90 TABLET | Refills: 1 | Status: SHIPPED | OUTPATIENT
Start: 2024-12-26

## 2024-12-26 NOTE — PROGRESS NOTES
Rheumatology f/u Patient Note  =====================================================================================================    Chief complaint: gout  Chief Complaint   Patient presents with    Gout     4 month f/u. Hands are feeling much better. Going to start PT soon for back and hip pain and stiffness. Was taking allopurinol, but stopped when script ran out. No new symptoms. Converted rapid score of 5.0     PCP  Yeni Beltran DO  Fax: [phone]  Phone: [phone]    =====================================================================================================  HPI   Date of visit: 7/23/2024    Ciarra Sethi is a 74 year old female     Here for evaluation of polyarthralgia in the setting of a + dsDNA antibody.  Hx of lumbar spinal fusion. And history of bilateral TKA  -more hand pain recently.  Long history of chronic hand pain.  Diagnoses osteoarthritis in the past.  Hard to make a full fist.  Pain with typing.  Hx of dermatitis. Very itching skin.  Mostly in the left lower extremity.  No biopsy in the past. Using steroid creams which somewhat help.  Denies current alopecia, malar rash, photosensitivity rash, discoid lesions, oral/nasal ulcers, pleuritic chest pain,  seizures/psychosis, Raynaud's, dry eyes/mouth, or blood clots.  Denies miscarriages or obstetric events (early pre-eclampsia, IUGR, placental insufficiency)  -Prior pregnancies and/or children: 3 children.  --Pregnancy complications: no  -no miscarriages  Takes naproxen prn.   History of gout in pregnancy. Son with gout.    struggling with CAD s/p CABG in aftermath due to this.  ==============================================================================================================  Visit: 08/15/24  Here to discuss dual-energy CT.  Had a skin biopsy of the lower extremity rash.  This was consistent with venous stasis.  Bilateral foot pain and antalgic gait persist.  7/30/2024  Skin, left leg, shave  biopsy:  -Venous stasis dermatitis (see comment).   ==============================================================================================================  Today's Visit: 12/26/24    Doing much better since last visit.  Escalated allopurinol to 300 mg daily, was taking colchicine 0.6 mg daily.  Ran out of the medications about a month ago.  Fingers improved in terms of pain and the swelling went down quite a bit.  She was able to put on rings that she could not put on for quite some time, these started to fit again.  Denies any side effects from the medications.  No rashes or diarrhea.  -No new symptoms    5 point ROS negative except noted above  I had reviewed past medical and family histories together with allergy and medication lists documented.      Medications:  Current Outpatient Medications on File Prior to Visit   Medication Sig Dispense Refill    amLODIPine-Olmesartan 5-40 MG Oral Tab Take 1 tablet by mouth daily. 90 tablet 1    naproxen 500 MG Oral Tab Take 0.5 tablets (250 mg total) by mouth 2 (two) times daily as needed (for back and hand pain). Take with food and water. Stop and call if GI side effects 60 tablet 0    betamethasone dipropionate 0.05 % External Lotion APPLY TWICE DAILY STRICTLY TO THE AFFECTED AREA FOR 2 WEEKS. AVOID USE ON FACE AND BODY FOLDS.      Semaglutide (RYBELSUS) 14 MG Oral Tab Take 14 mg by mouth daily. Take 1 tablet by mouth on an empty stomach and wait 30 min before eating or drinking anything 30 tablet 3     No current facility-administered medications on file prior to visit.     ?  Allergies:  No Known Allergies    Objective    Vitals:    12/26/24 0857 12/26/24 0901   BP: 158/64 138/60   Pulse: 96    Resp: 18    Temp: 97.2 °F (36.2 °C)    SpO2: 98%    Weight: 187 lb (84.8 kg)    Height: 5' 6\" (1.676 m)      GEN: NAD, well-nourished.   HEENT: Head: NCAT. Face: No lesions. Eyes: Conjunctiva clear.   PULM:  easy effort  Extremities: No cyanosis, edema or deformities.    Neurologic: Strength, CN2-12 grossly intact   Skin: No lesions or rashes.  MSK:    Tophaceous gout thickening of the MCPs stable.  PIP/DIP tophi/effusions softer, scattered PIP tenderness        Labs:  7/2024  Uric acid 5.5   lupus anticoagulant, anticardiolipin IgG/IgM, beta-2 glycoprotein IgG/IgM negative  dsDNA 16.6, dsDNA by IFA neg  C3/C4 wnl    12/2023  dsDNA 15.7  Uric acid 4.5  Rf/ccp neg    Lab Results   Component Value Date    URIC 5.5 07/24/2024    URIC 4.5 12/15/2023    URIC 5.0 05/08/2019         Lab Results   Component Value Date    WBC 5.9 07/09/2024    RBC 4.16 07/09/2024    HGB 13.8 07/09/2024    HCT 39.5 07/09/2024    .0 07/09/2024    MCV 95.0 07/09/2024    MCH 33.2 07/09/2024    MCHC 34.9 07/09/2024    RDW 14.6 07/09/2024    NEPRELIM 3.92 07/09/2024    NEPERCENT 66.6 07/09/2024    LYPERCENT 20.5 07/09/2024    MOPERCENT 9.3 07/09/2024    EOPERCENT 2.4 07/09/2024    BAPERCENT 0.7 07/09/2024    NE 3.92 07/09/2024    LYMABS 1.21 07/09/2024    MOABSO 0.55 07/09/2024    EOABSO 0.14 07/09/2024    BAABSO 0.04 07/09/2024     Lab Results   Component Value Date     (H) 12/19/2024    BUN 13 12/19/2024    BUNCREA 14.6 03/05/2021    CREATSERUM 0.77 12/19/2024    ANIONGAP 7 12/19/2024    GFRNAA 84 07/01/2022    GFRAA 97 07/01/2022    CA 9.7 12/19/2024    OSMOCALC 279 12/19/2024    ALKPHO 91 12/19/2024    AST 27 12/19/2024    ALT 24 12/19/2024    BILT 0.6 12/19/2024    TP 7.4 12/19/2024    ALB 4.5 12/19/2024    GLOBULIN 2.9 12/19/2024    AGRATIO 1.7 05/16/2013     (L) 12/19/2024    K 4.5 12/19/2024    CL 99 12/19/2024    CO2 28.0 12/19/2024         No results found for: \"ANATI\", \"DRISS\", \"ANAS\", \"ANASCRN\", \"ANASCRNRFLX\", \"CHONG\"  No results found for: \"SSA\", \"SSAUR\", \"ANTISSA\", \"SSA52\", \"SSA60\", \"SSADD\", \"SSB\", \"ANTISSB\"  Lab Results   Component Value Date    DSDNA <10 07/24/2024     No results found for: \"SCL70\", \"SCL\", \"KAGFPAK34\"  Lab Results   Component Value Date    C3 118.0 07/24/2024     C4 25.7 07/24/2024     Lab Results   Component Value Date    DRVVT Negative 07/24/2024    [id number] 1.3 07/24/2024    [id number] <2.4 07/24/2024     Lab Results   Component Value Date    CARDIOLIPIGG 1.8 07/24/2024    CARDIOLIPIGM 2.4 07/24/2024       Additional Labs:    Radiology:  2019: XR hands c/w OA    Radiology review:      =====================================================================================================  Assessment and Plan  Assessment:  1. Idiopathic chronic gout of multiple sites with tophus    2. Therapeutic drug monitoring    3. 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   -Likely the cause of significant osteochondral hypertrophy and swelling of the small joints of the hands  -She also has overlapping erosive osteoarthritis  -Notes she had gout in pregnancy, son with gout as well  -Significant improvement in pain and tophaceous swelling after starting urate lowering therapy and colchicine.    #Chronic musculoskeletal pain, osteoarthritis: S/p bilateral TKA, lumbar fusion    #Elevated dsDNA antibody but no clinical features at this point that I can directly tied to systemic autoimmune disease such as systemic lupus erythematosus.  -Chronic left shin rash: Biopsy consistent with venous stasis  ?  Plan:  -Restart allopurinol 300 mg tablet daily, restart colchicine 0.6 mg daily  -Repeat gout/colchicine monitoring blood work in 3 months including CMP, CK, uric acid.  Goal uric acid roughly 50% of starting/baseline uric acid.  So we will try to achieve serum urate about 3.0  -Elevated dsDNA, recheck this via EIA and IFA technique    Rtc 6 months      Diagnoses and all orders for this visit:    Idiopathic chronic gout of multiple sites with tophus  -     allopurinol 300 MG Oral Tab; Take 1 tablet (300 mg total) by mouth daily.  -     colchicine 0.6 MG Oral Tab; Take 1 tablet (0.6 mg total) by mouth daily.  -     Comp Metabolic Panel  (14); Future  -     Uric Acid; Future  -     CK (Creatine Kinase) (Not Creatinine); Future  -     CBC With Differential With Platelet; Future  -     Connective Tissue Disease (DRISS) Screen, Reflex Specific Antibody; Future  -     dsDNA Antibody by IFA [E]; Future    Therapeutic drug monitoring  -     Comp Metabolic Panel (14); Future  -     Uric Acid; Future  -     CK (Creatine Kinase) (Not Creatinine); Future  -     CBC With Differential With Platelet; Future  -     Connective Tissue Disease (DRISS) Screen, Reflex Specific Antibody; Future  -     dsDNA Antibody by IFA [E]; Future    Ds DNA antibody positive  -     allopurinol 300 MG Oral Tab; Take 1 tablet (300 mg total) by mouth daily.  -     colchicine 0.6 MG Oral Tab; Take 1 tablet (0.6 mg total) by mouth daily.  -     Comp Metabolic Panel (14); Future  -     Uric Acid; Future  -     CK (Creatine Kinase) (Not Creatinine); Future  -     CBC With Differential With Platelet; Future  -     Connective Tissue Disease (DRISS) Screen, Reflex Specific Antibody; Future  -     dsDNA Antibody by IFA [E]; Future          Return in about 7 months (around 7/15/2025).      The above plan of care, diagnosis, orders, and follow-up were discussed with the patient. Questions related to this recommended plan of care were answered.    Thank you for referring this delightful patient to me. Please feel free to contact me with any questions.     This report was performed utilizing speech recognition software technology. Despite proofreading, speech recognition errors could escape detection. If a word or phrase is confusing or out of context, please do not hesitate to call for   clarification.       Kind regards      Slade Mcgarry MD  [facility]

## 2024-12-30 PROBLEM — M10.9 GOUTY ARTHRITIS: Status: ACTIVE | Noted: 2023-12-28

## 2025-01-27 ENCOUNTER — TELEPHONE (OUTPATIENT)
Dept: FAMILY MEDICINE CLINIC | Facility: CLINIC | Age: 75
End: 2025-01-27

## 2025-01-27 NOTE — TELEPHONE ENCOUNTER
Spoke with [facility] pharmacist.  Script refill for amlodipine-Olmesartan  5-40 mg was  sent to pharmacy on 12/19/24 by Dr Beltran for #90 and 1 additional. Pharmacist will dispense and notify patient when ready for .  Patient is aware.

## 2025-01-27 NOTE — TELEPHONE ENCOUNTER
Patient calling for a refill on her amlodipine olmesartan says she has called the pharmacy and they told her doctor has to approve it

## 2025-02-07 ENCOUNTER — ORDER TRANSCRIPTION (OUTPATIENT)
Dept: PHYSICAL THERAPY | Facility: HOSPITAL | Age: 75
End: 2025-02-07

## 2025-02-07 DIAGNOSIS — M48.061 FORAMINAL STENOSIS OF LUMBAR REGION: ICD-10-CM

## 2025-02-07 DIAGNOSIS — G89.29 CHRONIC MIDLINE LOW BACK PAIN WITHOUT SCIATICA: Primary | ICD-10-CM

## 2025-02-07 DIAGNOSIS — M54.50 CHRONIC MIDLINE LOW BACK PAIN WITHOUT SCIATICA: Primary | ICD-10-CM

## 2025-02-24 ENCOUNTER — OFFICE VISIT (OUTPATIENT)
Dept: INTERNAL MEDICINE CLINIC | Facility: CLINIC | Age: 75
End: 2025-02-24
Payer: MEDICARE

## 2025-02-24 VITALS
HEIGHT: 60 IN | RESPIRATION RATE: 16 BRPM | SYSTOLIC BLOOD PRESSURE: 120 MMHG | BODY MASS INDEX: 36.71 KG/M2 | HEART RATE: 82 BPM | DIASTOLIC BLOOD PRESSURE: 70 MMHG | WEIGHT: 187 LBS

## 2025-02-24 DIAGNOSIS — E55.9 VITAMIN D DEFICIENCY: ICD-10-CM

## 2025-02-24 DIAGNOSIS — G89.29 CHRONIC MIDLINE LOW BACK PAIN WITHOUT SCIATICA: ICD-10-CM

## 2025-02-24 DIAGNOSIS — E53.8 VITAMIN B12 DEFICIENCY: ICD-10-CM

## 2025-02-24 DIAGNOSIS — Z51.81 ENCOUNTER FOR THERAPEUTIC DRUG MONITORING: Primary | ICD-10-CM

## 2025-02-24 DIAGNOSIS — M54.50 CHRONIC MIDLINE LOW BACK PAIN WITHOUT SCIATICA: ICD-10-CM

## 2025-02-24 DIAGNOSIS — I10 ESSENTIAL HYPERTENSION: ICD-10-CM

## 2025-02-24 DIAGNOSIS — I10 ESSENTIAL HYPERTENSION, BENIGN: ICD-10-CM

## 2025-02-24 DIAGNOSIS — E66.811 OBESITY (BMI 30.0-34.9): ICD-10-CM

## 2025-02-24 PROCEDURE — 99214 OFFICE O/P EST MOD 30 MIN: CPT | Performed by: NURSE PRACTITIONER

## 2025-02-24 RX ORDER — TIRZEPATIDE 2.5 MG/.5ML
2.5 INJECTION, SOLUTION SUBCUTANEOUS WEEKLY
Qty: 2 ML | Refills: 3 | Status: SHIPPED | OUTPATIENT
Start: 2025-02-24

## 2025-02-24 NOTE — PROGRESS NOTES
HISTORY OF PRESENT ILLNESS  Chief Complaint   Patient presents with    Weight Check     Up 1     Ciarra Sethi is a 74 year old female here for follow up with medical weight loss program for the treatment of overweight, obesity, or morbid obesity.     Up #1 lbs (f/u from 12/2024)  Compliant on rybelsus 14mg  Tolerating well, is not feel like it's helping with decreasing appetite and no side effects     Is still cooking more at home   Has been trying to get more fruits   Will start PT next week (right hip)   Stress is still very high, with husbands health   Exercise/Activity: not doing anything routine as far as exercise  Nutrition: eating regular meals, +protein, minimal veggies. not tracking reports  Meals out per week on average: 2  Stress is manageable   Sleep: 4 hours/night, waking up feeling tired (ill and worry)     Denies chest pain, shortness of breath, dizziness, blurred vision, headache, paresthesia, nausea/vomiting.     Breakfast Lunch Dinner Snacks Fluids   Reviewed              Wt Readings from Last 6 Encounters:   02/24/25 187 lb (84.8 kg)   12/26/24 187 lb (84.8 kg)   12/19/24 186 lb 6.4 oz (84.6 kg)   12/16/24 186 lb (84.4 kg)   08/15/24 191 lb (86.6 kg)   07/23/24 191 lb (86.6 kg)          REVIEW OF SYSTEMS  GENERAL: feels well otherwise, denied any fevers chills or night sweats   LUNGS: denies shortness of breath  CARDIOVASCULAR: denies chest pain  GI: denies abdominal pain  MUSCULOSKELETAL: +back pain, +joint pains   PSYCH: denies change in behavior or mood, denies feeling sad or depressed    EXAM  /70   Pulse 82   Resp 16   Ht 5' (1.524 m)   Wt 187 lb (84.8 kg)   BMI 36.52 kg/m²       GENERAL: well developed, well nourished, in no apparent distress, A/O x3  SKIN: no rashes, no suspicious lesions  HEENT: atraumatic, normocephalic, OP-clear, PERRLA  NECK: supple, no adenopathy  LUNGS: CTA in all fields, breathing non labored  CARDIO: RRR without murmur  GI: +BS, NT/ND, no masses or  HSM  EXTREMITIES: no cyanosis, no clubbing, no edema    Lab Results   Component Value Date     (H) 12/19/2024    BUN 13 12/19/2024    BUNCREA 14.6 03/05/2021    CREATSERUM 0.77 12/19/2024    ANIONGAP 7 12/19/2024    GFRNAA 84 07/01/2022    GFRAA 97 07/01/2022    CA 9.7 12/19/2024    OSMOCALC 279 12/19/2024    ALKPHO 91 12/19/2024    AST 27 12/19/2024    ALT 24 12/19/2024    BILT 0.6 12/19/2024    TP 7.4 12/19/2024    ALB 4.5 12/19/2024    GLOBULIN 2.9 12/19/2024    AGRATIO 1.7 05/16/2013     (L) 12/19/2024    K 4.5 12/19/2024    CL 99 12/19/2024    CO2 28.0 12/19/2024     Lab Results   Component Value Date     12/19/2024    A1C 5.1 12/19/2024     Lab Results   Component Value Date    CHOLEST 193 12/19/2024    TRIG 69 12/19/2024    HDL 89 (H) 12/19/2024    LDL 91 12/19/2024    VLDL 11 12/19/2024    NONHDLC 104 12/19/2024     Lab Results   Component Value Date    B12 427 06/04/2019     Lab Results   Component Value Date    VITD 34.22 09/25/2018       Medications Ordered Prior to Encounter[1]    ASSESSMENT/PLAN    ICD-10-CM    1. Encounter for therapeutic drug monitoring  Z51.81       2. Obesity (BMI 30.0-34.9)  E66.811       3. Essential hypertension, benign  I10       4. Vitamin B12 deficiency  E53.8       5. Vitamin D deficiency  E55.9       6. Essential hypertension  I10       7. Chronic midline low back pain without sciatica  M54.50     G89.29           PLAN   Initial Weight Data and Goal Weight Loss:  Initial consult: 198# lbs on 12/2018  Weight Calculations  Initial Weight: 198 lbs  Initial Weight Date: 12/01/18  Today's Weight: 187 lbs  5% Goal: 9.9  10% Goal: 19.8  Total Weight Loss: 11 lbs  Total weight loss: up #1 lbs total, Net loss 11 lbs  Will stop medications: rybelsus 14mg (see HPI)   Will trial medication: zepbound 2.5mg weekly (lillydirect)   --advised of side effects and adverse effects of this medication  Contradictions: has done plenity in the past, has done lomaria, topamax,  and stopped contrave (due to nausea), ozempic, compound semaglutide   Reviewed labs   Continue with vitamin d OTC  HTN  blood pressure is in office is stable - continue present management  Chronic back pain (hopefully will restart doing aqua therapy again at [facility])   Stress is high, see HPI  Wrote out macros and encouraged tracking food  Nutrition: Low carb diet, recommended to eat breakfast daily/ regular protein intake  Follow up with dietitian and psychologist as recommended.  Discussed the role of sleep and stress in weight management.  Counseled on comprehensive weight loss plan including attention to nutrition, exercise and behavior/stress management for success. See patient instruction below for more details.  Discussed strategies to overcome barriers to successful weight loss and weight maintenance  FITTE: ACSM recommendations (150-300 minutes/ week in active weight loss)   Weight Loss Consent to treat reviewed and signed.    Total time spent on chart review, pre-charting, obtaining history, counseling, and educating, reviewing labs was 30 minutes.       NOTE TO PATIENT: The 21st Century Cures Act makes clinical notes like these available to patients in the interest of transparency. Clinical notes are medical documents used by physicians and care providers to communicate with each other. These documents include medical language and terminology, abbreviations, and treatment information that may sound technical and at times possibly unfamiliar. In addition, at times, the verbiage may appear blunt or direct. These documents are one tool providers use to communicate relevant information and clinical opinions of the care providers in a way that allows common understanding of the clinical context.     There are no Patient Instructions on file for this visit.    No follow-ups on file.    Patient verbalizes understanding.    LUISA Joiner             [1]   Current Outpatient Medications on File Prior to  Visit   Medication Sig Dispense Refill    allopurinol 300 MG Oral Tab Take 1 tablet (300 mg total) by mouth daily. 90 tablet 3    colchicine 0.6 MG Oral Tab Take 1 tablet (0.6 mg total) by mouth daily. 90 tablet 1    amLODIPine-Olmesartan 5-40 MG Oral Tab Take 1 tablet by mouth daily. 90 tablet 1    naproxen 500 MG Oral Tab Take 0.5 tablets (250 mg total) by mouth 2 (two) times daily as needed (for back and hand pain). Take with food and water. Stop and call if GI side effects 60 tablet 0    betamethasone dipropionate 0.05 % External Lotion APPLY TWICE DAILY STRICTLY TO THE AFFECTED AREA FOR 2 WEEKS. AVOID USE ON FACE AND BODY FOLDS.      Semaglutide (RYBELSUS) 14 MG Oral Tab Take 14 mg by mouth daily. Take 1 tablet by mouth on an empty stomach and wait 30 min before eating or drinking anything 30 tablet 3     No current facility-administered medications on file prior to visit.

## 2025-02-24 NOTE — PATIENT INSTRUCTIONS
Next steps:  1.  Fill your prescribed medication and take as discussed and prescribed: stop rybelsus  Will trial zepbound 2.5mg weekly   2.  Schedule a personal nutrition consultation with one of our registered dieticians     Please try to work on the following dietary changes:  Daily protein recommendation to start:  grams  Daily carbohydrate: <100g  Daily calories: 1,300  1.  Drink water with meals and throughout the day, cut down on soda and/or juice if consumed. Consider flavored water options like Bubbly, Spindrift, Hint and Maryann.  2.  Eat breakfast daily and focus on having protein with each meal, examples include: greek yogurt, cottage cheese, hard boiled egg, whole grain toast with peanut butter.   3.  Reduce refined carbohydrates and sugars which includes items such as sweets, as well as rice, pasta, and bread and make sure to choose whole grain options when having them with just 1 serving per meal about the size of your inner palm.  4.  Consume non starchy veggies daily working towards making them a good 50% of your daily food intake. Add them to lunch and dinner consistently.  5.  Start a daily probiotic: VSL#3 is recommended, (order on line at www.vsl3.com). Take 1 capsule daily with water for 30 days, then reduce to 1 every other day (this will reduce the cost). Capsules can be left out for 2 weeks, but then must be refrigerated.      Please download saskia My Fitness Pal, LoseIt! Or Net Diary to monitor daily dietary intake and you will be able to see if you are eating the right amount of calories or too much or too little which would hinder weight loss. Additionally this will help to see your daily carbohydrate and protein intake. When you set the saskia up choose 1-2 lbs/week as a goal.  Keeping a paper food journal is an option as well to remain accountable for your choices- this is the start to mindful eating! A low calorie diet has been consistently shown to support weight loss.     Continue or  start exercising to help establish a routine. If not already exercising begin with 1 day and progress as able with long-term goal of 30 minutes 5 days a week at a minimum.     Meditation daily can help manage and control stress. Chronic stress can make weight loss difficult.  Exercising is one way to help with stress, but meditation using the CALM Clare or another comparable alternative can be done in your home or place of work with little time commitment. This Clare can also help work on behavior change and improve sleep. Check out the segment under Calm Masterclass and listen to The 4 Pillars of Health. A great way to begin learning about the foundation of lifestyle with practical tips to use in your every day.     Check out www.yourweightmatters.org blog for continued daily support and education along this weight loss journey!    Patient Resources:     Personal Training/Fitness Classes/Health Coaching     [facility] @ [email or web address] Full fitness center with group fitness and personal training. Discount available as client of [employer] [facility].  Health Coaching and Personal Training with Adali Leo at our [facility]- individual weekly coaching with option to add personal training and small group fitness classes targeted at weight loss- [phone] and/or email @ [email or web address]  360FIT [place] [email or web address] Group Fitness [phone] and/or email Nannette at [email or web address]  [facility] Fitness Centers with multiple locations: Scout (www.Gateway Development Group), Eat The Frog Fitness (www.Synchronicity.co.Metrolight), Fit Body Bootcamp (www.YbrainbodybootExpertp.Metrolight), Glyde Fitness (www.Supercell.Metrolight), The Exercise  (www.exercisecoach.Metrolight)     Online Fitness  Fitness  on duuin  Fit in 10 DVD series- www.dskrj05IWK.com  Sit and Be Fit - Chair exercise series  Www.sitandbefit.org  Hip Hop Fit with Dominic Martínez at www.hiphopfit.net     Apps for on the Go Fitness  Tupalo 7 Minute Workout (orange box with white 7) - free on the go HIIT training clare  Peloton Clare @ www.onepeloton.com     Nutrition Trackers and Tools  LoseIT! And My Fitness Pal apps and on line for tracking nutrition  NOOM - virtual health coaching  FitFoundation (healthy meals on the go) in [place] @ www.yhchqmtefxssu7dpushd  Jaleel DOUGLAS @ wwwAKSEL GROUPbistromd.com and Icaoly28 (keto and low carb plans recommended) @ www.ameqxh48.com, Metabolic Meals @ www.MyMetabolicMeals.com - individual prepared meals to go  Gobble, Blue Apron, Home , Every Plate, Sunbasket- on line meal delivery programs for preparation at home  Meal Village in [place] for homemade meals to go @ www.mealInclude Fitnessage.Africasana  Diet Doctor @ www.dietdoctor.Africasana - low carb swaps  Yummly - meal prep and planning clare (www.yummly.com)     Stress Management/Behavior/Mindful Eating  CALM meditation clare (www.calm.com)  Headspace  Am I Hungry? Mindful eating virtual  clare  Www.yourweightmatters.org - Obesity Action Coalition sponsored Blog posts daily  Motivation clare (black box with white \")- daily supportive messages sent to your phone     Books/Video Education/Podcasts  Mindless Eating by Etienne Bae  Why We Get Sick by Sanket Esqueda (a book about insulin resistance)  Atomic Habits by Itz Lynch (a book about taking small steps to promote greater behavior change)   Can't Hurt Me by Ashvin Benitez (a book exploring the power of discipline in achieving your goals)  The End of Dieting: How to Live for Life by Dr. Doni Howard M.D. or listen to The Sidekick Games Podcast Episode 63: Understanding \"Nutritarian\" Eating w/Dr. Doni Howard  Your Body in Balance: The New Science of Food, Hormones, and Health by Dr. Schuyler Bob  The Menopause Diet Plan by Vane Lau and Deloris Koroma  The Complete Guide to fasting by Dr. Chaudhry  Sugar, Salt & Fat by Karey  Nitish, Ph.D, R.D.  Weight Loss Surgery Will Not Treat Food Addiction by Lindsey Gee Ph.D  The Game Changers- Tvoopix Documentary on plant based nutrition  Fed Up - documentary about obesity (Free on Utube)  The Truth About Sugar - documentary on sugar (Free on Utube, https://youtu.be/8R3jrhlEC8g)  The Dr. Segundo T5 Wellness Plan by Dr. Gonzalo Segundo MD  Fitlosophy Fitspiration - journal to better health (found at [employer] in fitness aisle)  What Happened to You?- a look at the impact trauma has on behavior written by Amrik Herrera and Dr. Ulises Daly  Whole Again by Branden Jung - discovering your true self after trauma  Aurea Pinon talk on Folloze, The Call to Courage  Podcasts: The Exam Room by the Physician's Committee, Nutrition Facts by Dr. Pagan    We are here to support you with weight loss, but please remember that you still need your primary care provider for your routine health maintenance.

## 2025-03-07 ENCOUNTER — OFFICE VISIT (OUTPATIENT)
Dept: PHYSICAL THERAPY | Facility: HOSPITAL | Age: 75
End: 2025-03-07
Attending: FAMILY MEDICINE
Payer: MEDICARE

## 2025-03-07 DIAGNOSIS — M54.50 CHRONIC MIDLINE LOW BACK PAIN WITHOUT SCIATICA: Primary | ICD-10-CM

## 2025-03-07 DIAGNOSIS — G89.29 CHRONIC MIDLINE LOW BACK PAIN WITHOUT SCIATICA: Primary | ICD-10-CM

## 2025-03-07 DIAGNOSIS — M48.061 FORAMINAL STENOSIS OF LUMBAR REGION: ICD-10-CM

## 2025-03-07 PROCEDURE — 97110 THERAPEUTIC EXERCISES: CPT

## 2025-03-07 PROCEDURE — 97162 PT EVAL MOD COMPLEX 30 MIN: CPT

## 2025-03-07 NOTE — PROGRESS NOTES
SPINE EVALUATION:     Diagnosis:   Chronic midline low back pain without sciatica (M54.50,G89.29)  Foraminal stenosis of lumbar region (M48.061) Patient:  Ciarra Sethi (74 year old, female)        Referring Provider: Yeni Beltran  Today's Date   3/7/2025    Precautions:   none Date of Evaluation: 03/07/25  Next MD visit: No data recorded  Date of Surgery: 2022     PATIENT SUMMARY   Summary of chief complaints: Back pain and leg pain  History of current condition: Pt reports she had a spinal fusion in 2022 and subsequent PT following this. Recently she has noticed her right side is feeling a lot weaker and reoccuring pain in the back and into the leg. Pain feels pretty much the same following recovery from the lumbar fusion. Symptoms have been returning for several months now. Using the cane more frequently. Pt is a professor at [employer], more difficult to get around campus. Pt reports lying down to sleep is the least painful. One leg is now shorter than the other following the surgery. Did continue exercises for a while following initial PT, but has been out of routine for a little bit.   Pain level: current 3 /10, at best 2 /10, at worst 8 /10  Description of symptoms: Back: increased with standing and walking, pain in the middle, dull pain. Leg pain: right side, difficult to walk a longer distance and feels weak.   Occupation:    Leisure activities/Hobbies:     Prior level of function: complete recovery following back surgery, able to walk 2 blocks without difficulty  Current limitations: walking longer distance (>1 block), stairs, sitting for prolonged periods  Pt goals: reduce pain and improve overall mobility  Red flag signs/symptoms: Pt denies dizziness, drop attacks, dysphagia, dysarthria, diplopia; Pt denies changes in bowel/bladder function, saddle anesthesia; Pt denies pain that wakes in sleep, fever, recent trauma, history of CA, pain unchanged with movement/activity    Past  medical history was reviewed with Ciarra.  Significant findings include:    Imaging/Tests:     Ciarra  has a past medical history of Anesthesia complication, Arthritis, Back problem, BENIGN HYPERTENSION (09/15/2007), Essential hypertension (1/2/1999), High blood pressure, motion sickness, Obesity (2/9/2022), Osteoarthritis, PONV (postoperative nausea and vomiting), Unspecified vitamin D deficiency (11/15/2010), and Visual impairment.  She  has a past surgical history that includes other; other (2012,2013); other (2014,2015); total abdom hysterectomy; hysterectomy (1989); knee replacement surgery (2015); other surgical history (2012,2013); Spinal fusion (05/2022); and back surgery (5/10/2022).    ASSESSMENT  Ciarra presents to physical therapy evaluation with primary c/o Back pain and leg pain. The results of the objective tests and measures show gross weakness throughout gluteal muscles, trendelenburg gait pattern with L hip drop, heavy overpronation of L ankle, asymmetrical shoulder and hip alignment, increased tension of paraspinals, hamstrings and piriformis, leg length discrepency. Functional deficits include but are not limited to walking longer distance (>1 block), stairs, sitting for prolonged periods. Signs and symptoms are consistent with diagnosis of Chronic midline low back pain without sciatica (M54.50,G89.29)  Foraminal stenosis of lumbar region (M48.061). Pt and PT discussed evaluation findings, pathology, POC and HEP.  Pt voiced understanding and performs HEP correctly without reported pain. Skilled Physical Therapy is medically necessary to address the above impairments and reach functional goals.    OBJECTIVE:    Musculoskeletal:  Observation/Posture: trunk shift; pelvic asymmetry (increased gluteal muscle mass on R versus L, asymmetrical shoulder/pelvic alignment)   Accessory Motion: stiff end feel with CPA, tenderness with UPA at R L5/S1   Palpation: increased paraspinal muscle tension     Special  tests:   SLR: positive at 55 deg bilat JONAH: positive R Leg length discrepancy; R shorter than L      ROM and Strength:  (* denotes performed with pain)  Trunk ROM MMT (-/5)     Flex 50       Ext 3      R L R L     Side bend limited* limited*         Rotation limited* limited       ,   Hip   ROM MMT (-/5)    R L R L     Flex (L2)     5 5     Ext  Limited Limited 3+ 4     Abd     4-* 4-*     ER WFL WFL 4* 4+     IR WFL WFL 4-* 4+    ,   Knee   ROM MMT (-/5)    R L R L     Flex     5 5     Ext (L3)     5 5     ,   Ankle/Foot   ROM MMT (-/5)    R L R L     PF     5 5     DF (L4)     5 5       Flexibility:  LE Flexibility R L     Hip Flexor         Hamstrings mod restricted mod restricted     ITB         Piriformis min restricted min restricted     Quads         Gastroc-soleus           Neurological:  Sensation: grossly intact to light touch KAREN UE/LE     Deep Tendon Reflexes: grossly intact KAREN UE/LE        Peripheral Neurodynamic: WNL except     Balance and Functional Mobility:  Gait: pt ambulates on level ground with trendelenburg/waddle (heavy L ankle pronation during stance, hip drop/trendelenburg L side, R shoulder higher than L).     Today's Treatment and Response:   Pt education was provided on exam findings, treatment diagnosis, treatment plan, expectations, and prognosis.  Today's Treatment       3/7/2025   Spine Treatment   Therapeutic Exercise Trial of heel lift in R shoe  Seated piriformis stretch  Khai, x10  Pt education: use of lumbar roll and proper placement, education on findings of exam and proper use of cane, plan of care for therapy    Therapeutic Exercise Minutes 10   Evaluation Minutes 30   Total Time Of Timed Procedures 10   Total Time Of Service-Based Procedures 30   Total Treatment Time 40   HEP Access Code: [email or web address]  URL: [email or web address]  Date: 03/07/2025  Prepared by: Lisa Weber    Exercises  - Supine Lower Trunk Rotation  - 1 x daily - 7 x weekly - 2 sets -  10 reps  - Seated Piriformis Stretch  - 1 x daily - 7 x weekly - 1-2 sets - 4 reps - 15 sec hold  - Supine Piriformis Stretch with Foot on Ground  - 1 x daily - 7 x weekly - 1-2 sets - 4 reps - 15 sec hold  - Clamshell  - 1 x daily - 7 x weekly - 2 sets - 10 reps        Patient was instructed in and issued a HEP for: Access Code: [email or web address]  URL: [email or web address]  Date: 03/07/2025  Prepared by: Lisa Weber    Exercises  - Supine Lower Trunk Rotation  - 1 x daily - 7 x weekly - 2 sets - 10 reps  - Seated Piriformis Stretch  - 1 x daily - 7 x weekly - 1-2 sets - 4 reps - 15 sec hold  - Supine Piriformis Stretch with Foot on Ground  - 1 x daily - 7 x weekly - 1-2 sets - 4 reps - 15 sec hold  - Clamshell  - 1 x daily - 7 x weekly - 2 sets - 10 reps    Charges:  PT EVAL: Moderate Complexity, Mod COmplexity Eval 1 TE 1  In agreement with evaluation findings and clinical rationale, this evaluation involved MODERATE COMPLEXITY decision making due to 1-2 personal factors/comorbidities, 3 or more body structures involved/activity limitations, and evolving symptoms as documented in the evaluation.                                                                         PLAN OF CARE:    Goals: (to be met in 10 visits)    Not Met Progress Toward Partially Met Met   Pt will improve transversus abdominis recruitment to perform proper isometric contraction without requiring verbal or tactile cuing to promote advancement of therex. [] [] [] []   Pt will demonstrate good understanding of proper posture and body mechanics to decrease pain and improve spinal safety. [] [] [] []   Pt will improve lumbar spine AROM flexion to >60 deg to allow increase ease with bending forward to don shoes. [] [] [] []   Pt will have improved hip abductor strength to reduce Trendelenburg gait pattern and improve functional mobility [] [] [] []   Pt will have decreased paraspinal mm tension to tolerate standing >15 minutes for work  and home activities. [] [] [] []   Pt will demonstrate improved core strength to be able to perform lifting with <2/10 pain. [] [] [] []   Pt will be independent and compliant with comprehensive HEP to maintain progress achieved in PT [] [] [] []         Frequency / Duration: Patient will be seen 1-2x/week or a total of 10  visits over a 90 day period. Treatment will include: Gait training; Manual Therapy; Neuromuscular Re-education; Therapeutic Activities; Therapeutic Exercise; Home Exercise Program instruction    Education or treatment limitation: None   Rehab Potential: good     Oswestry Disability Index Score  Score: (Patient-Rptd) 34 % (3/6/2025 12:05 PM)      Patient/Family/Caregiver was advised of these findings, precautions, and treatment options and has agreed to actively participate in planning and for this course of care.    Thank you for your referral. Please co-sign or sign and return this letter via fax as soon as possible to [phone]. If you have any questions, please contact me at Dept: [phone]    Sincerely,  Electronically signed by therapist: Lisa Weber PT, DPT  Physician's certification required: Yes  I certify the need for these services furnished under this plan of treatment and while under my care.    X___________________________________________________ Date____________________    Certification From: 3/7/2025  To:6/5/2025

## 2025-03-12 ENCOUNTER — OFFICE VISIT (OUTPATIENT)
Dept: PHYSICAL THERAPY | Facility: HOSPITAL | Age: 75
End: 2025-03-12
Attending: FAMILY MEDICINE
Payer: MEDICARE

## 2025-03-12 PROCEDURE — 97140 MANUAL THERAPY 1/> REGIONS: CPT

## 2025-03-12 PROCEDURE — 97110 THERAPEUTIC EXERCISES: CPT

## 2025-03-12 NOTE — PROGRESS NOTES
Patient: Ciarra Sethi (74 year old, female) Referring Provider:  Insurance:   Diagnosis: Chronic midline low back pain without sciatica (M54.50,G89.29)  Foraminal stenosis of lumbar region (M48.061) Yeni Devin  MEDICARE   Date of Surgery: 2022 Next MD visit:  [place]   Precautions:    No data recorded Referral Information:    Date of Evaluation: Req: 0, Auth: 0, Exp:     03/07/25 POC Auth Visits:  10       Today's Date   3/12/2025    Subjective  Pt reports she is having a little less pain today. Did get a lumbar pillow but has not found much relief yet. Heel lift is feeling good so far.       Pain: 5/10     Objective  Tandem Stance: R leading 30 seconds wobbly pt states it is harder; L leading 30 seconds pt states it is slightly painful (increased accessory ankle motions noted bilaterally)  SLS: R 13 seconds, pt needed to use fingers for support 4 times during completion L 9 seconds, the pt needed to use fingers for support 6 times during completion, (trendelenburg noted during SLS on L)     Assessment  Pt attends first follow up after initial evaluation. Focused manual intervention on improving muscle tension in R gluteals, piriformis and lumbar erector spinae. Increased tension noted throughout these muscle groups to palpation. Pt able to follow up with stretches to improve muscle length and mobility with no adverse reaction. Tandem and SL balance measures taken in objective, pt able to hold tandem balance for 30 sec bilat, however increased accessory ankle motions noted and trunk instability. Limited SLS bilaterally. Pt able to work on glute and core strengthening with min cues for form and no increased pain. Continue per plan of care to achieve goals set for therapy.    Goals (to be met in 10 visits)    Not Met Progress Toward Partially Met Met   Pt will improve transversus abdominis recruitment to perform proper isometric contraction without requiring verbal or tactile cuing to promote  advancement of therex. [] [] [] []   Pt will demonstrate good understanding of proper posture and body mechanics to decrease pain and improve spinal safety. [] [] [] []   Pt will improve lumbar spine AROM flexion to >60 deg to allow increase ease with bending forward to don shoes. [] [] [] []   Pt will have improved hip abductor strength to reduce Trendelenburg gait pattern and improve functional mobility [] [] [] []   Pt will have decreased paraspinal mm tension to tolerate standing >15 minutes for work and home activities. [] [] [] []   Pt will demonstrate improved core strength to be able to perform lifting with <2/10 pain. [] [] [] []   Pt will be independent and compliant with comprehensive HEP to maintain progress achieved in PT [] [] [] []             Plan  Continue per plan of care.    Treatment Last 4 Visits       3/12/2025   PT Treatment   Treatment Day 2          3/7/2025 3/12/2025   Spine Treatment   Treatment Day  2   Therapeutic Exercise Trial of heel lift in R shoe  Seated piriformis stretch  Khai, x10  Pt education: use of lumbar roll and proper placement, education on findings of exam and proper use of cane, plan of care for therapy  Nustep, level 5, 5 min   LTR x10 bilat   Hip/knee flexion w/ SB, x15  Seated piriformis stretch, 2x30 sec bilat   Short lever bridge, SB under knees, 2x10   Standing HS stretch on 4 in step, 3x20 sec   Prone glute squeeze w/ ball, 5x10 sec    Manual Therapy  STM to piriformis and glutes with foam roller  Side lying R gapping, multiple bouts  STM to R paraspinals   Therapeutic Exercise Minutes 10 27   Manual Therapy Minutes  15   Evaluation Minutes 30    Total Time Of Timed Procedures 10 42   Total Time Of Service-Based Procedures 30 0   Total Treatment Time 40 42   HEP Access Code: [email or web address]  URL: [email or web address]  Date: 03/07/2025  Prepared by: Lisa Weber    Exercises  - Supine Lower Trunk Rotation  - 1 x daily - 7 x weekly - 2 sets - 10  reps  - Seated Piriformis Stretch  - 1 x daily - 7 x weekly - 1-2 sets - 4 reps - 15 sec hold  - Supine Piriformis Stretch with Foot on Ground  - 1 x daily - 7 x weekly - 1-2 sets - 4 reps - 15 sec hold  - Clamshell  - 1 x daily - 7 x weekly - 2 sets - 10 reps         HEP  Access Code: [email or web address]  URL: [email or web address]  Date: 03/07/2025  Prepared by: Lisa Weber    Exercises  - Supine Lower Trunk Rotation  - 1 x daily - 7 x weekly - 2 sets - 10 reps  - Seated Piriformis Stretch  - 1 x daily - 7 x weekly - 1-2 sets - 4 reps - 15 sec hold  - Supine Piriformis Stretch with Foot on Ground  - 1 x daily - 7 x weekly - 1-2 sets - 4 reps - 15 sec hold  - Clamshell  - 1 x daily - 7 x weekly - 2 sets - 10 reps    Charges  TE 2 MT 1

## 2025-03-17 ENCOUNTER — OFFICE VISIT (OUTPATIENT)
Dept: PHYSICAL THERAPY | Facility: HOSPITAL | Age: 75
End: 2025-03-17
Attending: FAMILY MEDICINE
Payer: MEDICARE

## 2025-03-17 PROCEDURE — 97140 MANUAL THERAPY 1/> REGIONS: CPT

## 2025-03-17 PROCEDURE — 97110 THERAPEUTIC EXERCISES: CPT

## 2025-03-17 NOTE — PROGRESS NOTES
Patient: Ciarra Sethi (74 year old, female) Referring Provider:  Insurance:   Diagnosis: Chronic midline low back pain without sciatica (M54.50,G89.29)  Foraminal stenosis of lumbar region (M48.061) Yeni Beltran  MEDICARE   Date of Surgery: 2022 Next MD visit:  [place]   Precautions:    No data recorded Referral Information:    Date of Evaluation: Req: 0, Auth: 0, Exp:     03/07/25 POC Auth Visits:  10       Today's Date   3/17/2025    Subjective  Pt reports she is a little more sore today from a lot of sitting. However did walk a lot over the weekend with the cane and this went okay.       Pain: 6/10     Objective  See tx flow sheet for progressions; added yellow band for home with clamshells          Assessment  Decreased intensity of session today due to increased soreness. Pt continues to have right sided back pain and increased tension in erector spinae, focused manual intervention here with no adverse reaction. Pt able to progress clamshells with yellow band, min cues for form. Provided band for home to upgrade challenge. Head applied at end of session for pain relief. Continue per plan of care to achieve goals set for therapy.    Goals (to be met in 10 visits)    Not Met Progress Toward Partially Met Met   Pt will improve transversus abdominis recruitment to perform proper isometric contraction without requiring verbal or tactile cuing to promote advancement of therex. [] [] [] []   Pt will demonstrate good understanding of proper posture and body mechanics to decrease pain and improve spinal safety. [] [] [] []   Pt will improve lumbar spine AROM flexion to >60 deg to allow increase ease with bending forward to don shoes. [] [] [] []   Pt will have improved hip abductor strength to reduce Trendelenburg gait pattern and improve functional mobility [] [] [] []   Pt will have decreased paraspinal mm tension to tolerate standing >15 minutes for work and home activities. [] [] [] []   Pt will  demonstrate improved core strength to be able to perform lifting with <2/10 pain. [] [] [] []   Pt will be independent and compliant with comprehensive HEP to maintain progress achieved in PT [] [] [] []                 Plan  Continue per plan of care.    Treatment Last 4 Visits       3/12/2025 3/17/2025   PT Treatment   Treatment Day 2 3          3/7/2025 3/12/2025 3/17/2025   Spine Treatment   Treatment Day  2 3   Therapeutic Exercise Trial of heel lift in R shoe  Seated piriformis stretch  Clamshells, x10  Pt education: use of lumbar roll and proper placement, education on findings of exam and proper use of cane, plan of care for therapy  Nustep, level 5, 5 min   LTR x10 bilat   Hip/knee flexion w/ SB, x15  Seated piriformis stretch, 2x30 sec bilat   Short lever bridge, SB under knees, 2x10   Standing HS stretch on 4 in step, 3x20 sec   Prone glute squeeze w/ ball, 5x10 sec  Nustep, level 5, 5 min   LTR x10 bilat   Hip/knee flexion w/ SB, x20   Short lever bridge, SB under knees, 2x10   Standing HS stretch on 4 in step, 3x20 sec   Clamshells, yellow band, 2x10 bilat   Prone glute squeeze w/ ball, 2x5 5 sec holds      Manual Therapy  STM to piriformis and glutes with foam roller  Side lying R gapping, multiple bouts  STM to R paraspinals STM to piriformis and glutes with foam roller  Side lying R gapping, multiple bouts  STM to R paraspinals       Therapeutic Exercise Minutes 10 27 25   Manual Therapy Minutes  15 13   Evaluation Minutes 30     Hot/Cold Pack Minutes   8   Total Time Of Timed Procedures 10 42 38   Total Time Of Service-Based Procedures 30 0 8   Total Treatment Time 40 42 46   HEP Access Code: [email or web address]  URL: [email or web address]  Date: 03/07/2025  Prepared by: Lisa Weber    Exercises  - Supine Lower Trunk Rotation  - 1 x daily - 7 x weekly - 2 sets - 10 reps  - Seated Piriformis Stretch  - 1 x daily - 7 x weekly - 1-2 sets - 4 reps - 15 sec hold  - Supine Piriformis Stretch with  Foot on Ground  - 1 x daily - 7 x weekly - 1-2 sets - 4 reps - 15 sec hold  - Clamshell  - 1 x daily - 7 x weekly - 2 sets - 10 reps          HEP  Access Code: [email or web address]  URL: [email or web address]  Date: 03/07/2025  Prepared by: Lisa Weber    Exercises  - Supine Lower Trunk Rotation  - 1 x daily - 7 x weekly - 2 sets - 10 reps  - Seated Piriformis Stretch  - 1 x daily - 7 x weekly - 1-2 sets - 4 reps - 15 sec hold  - Supine Piriformis Stretch with Foot on Ground  - 1 x daily - 7 x weekly - 1-2 sets - 4 reps - 15 sec hold  - Clamshell  - 1 x daily - 7 x weekly - 2 sets - 10 reps    Charges  TE 2 MT 1

## 2025-03-19 ENCOUNTER — OFFICE VISIT (OUTPATIENT)
Dept: PHYSICAL THERAPY | Facility: HOSPITAL | Age: 75
End: 2025-03-19
Attending: FAMILY MEDICINE
Payer: MEDICARE

## 2025-03-19 PROCEDURE — 97110 THERAPEUTIC EXERCISES: CPT

## 2025-03-19 PROCEDURE — 97140 MANUAL THERAPY 1/> REGIONS: CPT

## 2025-03-19 NOTE — PROGRESS NOTES
Patient: Ciarra Sethi (74 year old, female) Referring Provider:  Insurance:   Diagnosis: Chronic midline low back pain without sciatica (M54.50,G89.29)  Foraminal stenosis of lumbar region (M48.061) Yeni Howellsushil  MEDICARE   Date of Surgery: 2022 Next MD visit:  [place]   Precautions:    No data recorded Referral Information:    Date of Evaluation: Req: 0, Auth: 0, Exp:     03/07/25 POC Auth Visits:  10       Today's Date   3/19/2025    Subjective  Pt reports she is feeling better today. Less soreness than previous session this week.       Pain: 4/10     Objective  See tx flow sheet for progressions; noted lateral lean and knee valgus when attempting side stepping with LLE and pain, with UE support improves form and decreases pain.         Assessment  Pt has good tolerance for standing hip strengthening today with side stepping and fwd/retro walks with band. Initially, when sidestepping to L, pt has noted trunk lean and knee valgus and pain with activity. Cued pt to utilize UE support and loosened band, pt able to complete with great reduction in pain following this. Pt able to complete BKFO with good core activation throughout, consider adding band at next session.    Goals (to be met in 10 visits)    Not Met Progress Toward Partially Met Met   Pt will improve transversus abdominis recruitment to perform proper isometric contraction without requiring verbal or tactile cuing to promote advancement of therex. [] [] [] []   Pt will demonstrate good understanding of proper posture and body mechanics to decrease pain and improve spinal safety. [] [] [] []   Pt will improve lumbar spine AROM flexion to >60 deg to allow increase ease with bending forward to don shoes. [] [] [] []   Pt will have improved hip abductor strength to reduce Trendelenburg gait pattern and improve functional mobility [] [] [] []   Pt will have decreased paraspinal mm tension to tolerate standing >15 minutes for work and home  activities. [] [] [] []   Pt will demonstrate improved core strength to be able to perform lifting with <2/10 pain. [] [] [] []   Pt will be independent and compliant with comprehensive HEP to maintain progress achieved in PT [] [] [] []                     Plan  Continue per plan of care. Next session: shift correction at wall    Treatment Last 4 Visits       3/12/2025 3/17/2025 3/19/2025   PT Treatment   Treatment Day 2 3 4          3/7/2025 3/12/2025 3/17/2025 3/19/2025   Spine Treatment   Treatment Day  2 3 4   Therapeutic Exercise Trial of heel lift in R shoe  Seated piriformis stretch  Clamshells, x10  Pt education: use of lumbar roll and proper placement, education on findings of exam and proper use of cane, plan of care for therapy  Nustep, level 5, 5 min   LTR x10 bilat   Hip/knee flexion w/ SB, x15  Seated piriformis stretch, 2x30 sec bilat   Short lever bridge, SB under knees, 2x10   Standing HS stretch on 4 in step, 3x20 sec   Prone glute squeeze w/ ball, 5x10 sec  Nustep, level 5, 5 min   LTR x10 bilat   Hip/knee flexion w/ SB, x20   Short lever bridge, SB under knees, 2x10   Standing HS stretch on 4 in step, 3x20 sec   Clamshells, yellow band, 2x10 bilat   Prone glute squeeze w/ ball, 2x5 5 sec holds    Nustep, level 5, 5 min   Hip/knee flexion w/ SB, x20   Bridge, legs straight on SB, 2x8  SLR, x8 bilat   BKFO, w/ TA contraction, 2x10 bilat   Side stepping with yellow band, 2 laps in // bars  Fwd/bkwd monster walks with yellow band, 1 laps, in // bars      Manual Therapy  STM to piriformis and glutes with foam roller  Side lying R gapping, multiple bouts  STM to R paraspinals STM to piriformis and glutes with foam roller  Side lying R gapping, multiple bouts  STM to R paraspinals     STM to piriformis and glutes with foam roller  Side lying R gapping, multiple bouts  STM to R paraspinals      Therapeutic Exercise Minutes 10 27 25 25   Manual Therapy Minutes  15 13 20   Evaluation Minutes 30       Hot/Cold Pack Minutes   8    Total Time Of Timed Procedures 10 42 38 45   Total Time Of Service-Based Procedures 30 0 8 0   Total Treatment Time 40 42 46 45   HEP Access Code: [email or web address]  URL: [email or web address]  Date: 03/07/2025  Prepared by: Lisa Weber    Exercises  - Supine Lower Trunk Rotation  - 1 x daily - 7 x weekly - 2 sets - 10 reps  - Seated Piriformis Stretch  - 1 x daily - 7 x weekly - 1-2 sets - 4 reps - 15 sec hold  - Supine Piriformis Stretch with Foot on Ground  - 1 x daily - 7 x weekly - 1-2 sets - 4 reps - 15 sec hold  - Clamshell  - 1 x daily - 7 x weekly - 2 sets - 10 reps           HEP  Access Code: [email or web address]  URL: [email or web address]  Date: 03/07/2025  Prepared by: Lisa Hedin    Exercises  - Supine Lower Trunk Rotation  - 1 x daily - 7 x weekly - 2 sets - 10 reps  - Seated Piriformis Stretch  - 1 x daily - 7 x weekly - 1-2 sets - 4 reps - 15 sec hold  - Supine Piriformis Stretch with Foot on Ground  - 1 x daily - 7 x weekly - 1-2 sets - 4 reps - 15 sec hold  - Clamshell  - 1 x daily - 7 x weekly - 2 sets - 10 reps    Charges  TE 2 MT 1

## 2025-03-24 ENCOUNTER — OFFICE VISIT (OUTPATIENT)
Dept: PHYSICAL THERAPY | Facility: HOSPITAL | Age: 75
End: 2025-03-24
Attending: FAMILY MEDICINE
Payer: MEDICARE

## 2025-03-24 PROCEDURE — 97140 MANUAL THERAPY 1/> REGIONS: CPT

## 2025-03-24 PROCEDURE — 97110 THERAPEUTIC EXERCISES: CPT

## 2025-03-24 NOTE — PROGRESS NOTES
Patient: Ciarra Sethi (74 year old, female) Referring Provider:  Insurance:   Diagnosis: Chronic midline low back pain without sciatica (M54.50,G89.29)  Foraminal stenosis of lumbar region (M48.061) Yeni Devin  MEDICARE   Date of Surgery: 2022 Next MD visit:  [place] [facility]   Precautions:    No data recorded Referral Information:    Date of Evaluation: Req: 0, Auth: 0, Exp:     03/07/25 POC Auth Visits:  10       Today's Date   3/24/2025    Subjective  Pt reports she is feeling more stiffness throughout the low back and side today.  has been sick so she has had to care for him more recently which contributes to this.       Pain: 7/10     Objective  Subjective improvements in pain following shift correction at wall; assigned for home       Assessment  Pt able to tolerate session well with no adverse reaction. Completed lateral shifts at wall to correct R hip shift. Pt able to complete with min tactile cues and gets good side bend for L opening. Following exercise pt reports some decrease in pain, assigned for home and instructed to complete a few times per day and assess response. Pt able to progress BKFO with green band, min cues for form throughout. Pt education on type of heel lift to buy and continued use of heat and massager as needed for right sided pain.    Goals (to be met in 10 visits)    Not Met Progress Toward Partially Met Met   Pt will improve transversus abdominis recruitment to perform proper isometric contraction without requiring verbal or tactile cuing to promote advancement of therex. [] [] [] []   Pt will demonstrate good understanding of proper posture and body mechanics to decrease pain and improve spinal safety. [] [] [] []   Pt will improve lumbar spine AROM flexion to >60 deg to allow increase ease with bending forward to don shoes. [] [] [] []   Pt will have improved hip abductor strength to reduce Trendelenburg gait pattern and improve functional mobility [] [] [] []   Pt  will have decreased paraspinal mm tension to tolerate standing >15 minutes for work and home activities. [] [] [] []   Pt will demonstrate improved core strength to be able to perform lifting with <2/10 pain. [] [] [] []   Pt will be independent and compliant with comprehensive HEP to maintain progress achieved in PT [] [] [] []                       Plan  Continue per plan of care    Treatment Last 4 Visits       3/12/2025 3/17/2025 3/19/2025 3/24/2025   PT Treatment   Treatment Day 2 3 4 5          3/12/2025 3/17/2025 3/19/2025 3/24/2025   Spine Treatment   Treatment Day 2 3 4 5   Therapeutic Exercise Nustep, level 5, 5 min   LTR x10 bilat   Hip/knee flexion w/ SB, x15  Seated piriformis stretch, 2x30 sec bilat   Short lever bridge, SB under knees, 2x10   Standing HS stretch on 4 in step, 3x20 sec   Prone glute squeeze w/ ball, 5x10 sec  Nustep, level 5, 5 min   LTR x10 bilat   Hip/knee flexion w/ SB, x20   Short lever bridge, SB under knees, 2x10   Standing HS stretch on 4 in step, 3x20 sec   Clamshells, yellow band, 2x10 bilat   Prone glute squeeze w/ ball, 2x5 5 sec holds    Nustep, level 5, 5 min   Hip/knee flexion w/ SB, x20   Bridge, legs straight on SB, 2x8  SLR, x8 bilat   BKFO, w/ TA contraction, 2x10 bilat   Side stepping with yellow band, 2 laps in // bars  Fwd/bkwd monster walks with yellow band, 1 laps, in // bars    Nustep, level 5, 5 min   LTR, x10 bilat  Hip/knee flexion w/ SB, x10   Piriformis stretch w/ SB, x10 bilat  Shift correction at wall, going to L, 2x8  SLR, 2x8 bilat   BKFO, w/ TA contraction, 2x8 bilat, green band  Side stepping with yellow band, 4 laps at table       Manual Therapy STM to piriformis and glutes with foam roller  Side lying R gapping, multiple bouts  STM to R paraspinals STM to piriformis and glutes with foam roller  Side lying R gapping, multiple bouts  STM to R paraspinals     STM to piriformis and glutes with foam roller  Side lying R gapping, multiple bouts  STM to R  paraspinals    STM to piriformis and glutes  Side lying R gapping, multiple bouts   STM to R paraspinals      Therapeutic Exercise Minutes 27 25 25 25   Manual Therapy Minutes 15 13 20 15   Hot/Cold Pack Minutes  8     Total Time Of Timed Procedures 42 38 45 40   Total Time Of Service-Based Procedures 0 8 0 0   Total Treatment Time 42 46 45 40   HEP    Access Code: [email or web address]  URL: [email or web address]  Date: 03/24/2025  Prepared by: Lisa Hedin    Exercises  - Supine Lower Trunk Rotation  - 1 x daily - 7 x weekly - 2 sets - 10 reps  - Seated Piriformis Stretch  - 1 x daily - 7 x weekly - 1-2 sets - 4 reps - 15 sec hold  - Supine Piriformis Stretch with Foot on Ground  - 1 x daily - 7 x weekly - 1-2 sets - 4 reps - 15 sec hold  - Clamshell  - 1 x daily - 7 x weekly - 2 sets - 10 reps  - Left Standing Lateral Shift Correction at Wall - Hold  - 1-3 x daily - 7 x weekly - 3 sets - 5 reps        HEP  Access Code: [email or web address]  URL: [email or web address]  Date: 03/24/2025  Prepared by: Lisa Hedin    Exercises  - Supine Lower Trunk Rotation  - 1 x daily - 7 x weekly - 2 sets - 10 reps  - Seated Piriformis Stretch  - 1 x daily - 7 x weekly - 1-2 sets - 4 reps - 15 sec hold  - Supine Piriformis Stretch with Foot on Ground  - 1 x daily - 7 x weekly - 1-2 sets - 4 reps - 15 sec hold  - Clamshell  - 1 x daily - 7 x weekly - 2 sets - 10 reps  - Left Standing Lateral Shift Correction at Wall - Hold  - 1-3 x daily - 7 x weekly - 3 sets - 5 reps    Charges  TE2 MT 1

## 2025-03-31 ENCOUNTER — OFFICE VISIT (OUTPATIENT)
Dept: PHYSICAL THERAPY | Facility: HOSPITAL | Age: 75
End: 2025-03-31
Attending: FAMILY MEDICINE
Payer: MEDICARE

## 2025-03-31 PROCEDURE — 97110 THERAPEUTIC EXERCISES: CPT

## 2025-03-31 PROCEDURE — 97140 MANUAL THERAPY 1/> REGIONS: CPT

## 2025-03-31 NOTE — PROGRESS NOTES
Patient: Ciarra Sethi (74 year old, female) Referring Provider:  Insurance:   Diagnosis: Chronic midline low back pain without sciatica (M54.50,G89.29)  Foraminal stenosis of lumbar region (M48.061) Yeni Beltran  MEDICARE   Date of Surgery: 2022 Next MD visit:  [place] [facility]   Precautions:    No data recorded Referral Information:    Date of Evaluation: Req: 0, Auth: 0, Exp:     03/07/25 POC Auth Visits:  10       Today's Date   3/31/2025    Subjective  Pt reports less pain and states she is feeling much better since last session.       Pain: 5/10     Objective  see tx sheet            Assessment  Pt was able to tolerate the session well with great participation. Pt has been completing lateral shifts at home and reports they are much easier now and does them frequently. Pt struggled with R piriformis stretch on SB due to difficulty but was able to complete the exercise with good form with SPT assistance of helping roll the ball up. Pt was able to complete the rest of the exercises with minimal pain and proper form.    Goals (to be met in 10 visits)     Not Met Progress Toward Partially Met Met   Pt will improve transversus abdominis recruitment to perform proper isometric contraction without requiring verbal or tactile cuing to promote advancement of therex. []  []  []  []    Pt will demonstrate good understanding of proper posture and body mechanics to decrease pain and improve spinal safety. []  []  []  []    Pt will improve lumbar spine AROM flexion to >60 deg to allow increase ease with bending forward to don shoes. []  []  []  []    Pt will have improved hip abductor strength to reduce Trendelenburg gait pattern and improve functional mobility []  []  []  []    Pt will have decreased paraspinal mm tension to tolerate standing >15 minutes for work and home activities. []  []  []  []    Pt will demonstrate improved core strength to be able to perform lifting with <2/10 pain. []  []  []  []    Pt will be  independent and compliant with comprehensive HEP to maintain progress achieved in PT []  []  []  []             Plan  Continue per plan of care.    Treatment Last 4 Visits  Treatment Day: 6       3/17/2025 3/19/2025 3/24/2025 3/31/2025   Spine Treatment   Therapeutic Exercise Nustep, level 5, 5 min   LTR x10 bilat   Hip/knee flexion w/ SB, x20   Short lever bridge, SB under knees, 2x10   Standing HS stretch on 4 in step, 3x20 sec   Clamshells, yellow band, 2x10 bilat   Prone glute squeeze w/ ball, 2x5 5 sec holds    Nustep, level 5, 5 min   Hip/knee flexion w/ SB, x20   Bridge, legs straight on SB, 2x8  SLR, x8 bilat   BKFO, w/ TA contraction, 2x10 bilat   Side stepping with yellow band, 2 laps in // bars  Fwd/bkwd monster walks with yellow band, 1 laps, in // bars    Nustep, level 5, 5 min   LTR, x10 bilat  Hip/knee flexion w/ SB, x10   Piriformis stretch w/ SB, x10 bilat  Shift correction at wall, going to L, 2x8  SLR, 2x8 bilat   BKFO, w/ TA contraction, 2x8 bilat, green band  Side stepping with yellow band, 4 laps at table     Nustep, level 5, 5 min   LTR, x10 bilat   Hip/knee flexion w/ SB, x10   Piriformis stretch w/ SB, x10 bilat   Bridges w/ SB 2x10  SLR, 2x8 bilat   BKFO, w/ TA contraction, 2x8 bilat, green band   Side stepping with yellow band, 4 laps at table        Manual Therapy STM to piriformis and glutes with foam roller  Side lying R gapping, multiple bouts  STM to R paraspinals     STM to piriformis and glutes with foam roller  Side lying R gapping, multiple bouts  STM to R paraspinals    STM to piriformis and glutes  Side lying R gapping, multiple bouts   STM to R paraspinals    STM to piriformis and glutes   Side lying R gapping, multiple bouts   STM to R paraspinals      Therapeutic Exercise Minutes 25 25 25 26   Manual Therapy Minutes 13 20 15 15   Hot/Cold Pack Minutes 8      Total Time Of Timed Procedures 38 45 40 41   Total Time Of Service-Based Procedures 8 0 0 0   Total Treatment Time 46  45 40 41   HEP   Access Code: [email or web address]  URL: [email or web address]  Date: 03/24/2025  Prepared by: Lisa Hedin    Exercises  - Supine Lower Trunk Rotation  - 1 x daily - 7 x weekly - 2 sets - 10 reps  - Seated Piriformis Stretch  - 1 x daily - 7 x weekly - 1-2 sets - 4 reps - 15 sec hold  - Supine Piriformis Stretch with Foot on Ground  - 1 x daily - 7 x weekly - 1-2 sets - 4 reps - 15 sec hold  - Clamshell  - 1 x daily - 7 x weekly - 2 sets - 10 reps  - Left Standing Lateral Shift Correction at Wall - Hold  - 1-3 x daily - 7 x weekly - 3 sets - 5 reps         HEP  Access Code: [email or web address]  URL: [email or web address]  Date: 03/24/2025  Prepared by: Lisa Hedin    Exercises  - Supine Lower Trunk Rotation  - 1 x daily - 7 x weekly - 2 sets - 10 reps  - Seated Piriformis Stretch  - 1 x daily - 7 x weekly - 1-2 sets - 4 reps - 15 sec hold  - Supine Piriformis Stretch with Foot on Ground  - 1 x daily - 7 x weekly - 1-2 sets - 4 reps - 15 sec hold  - Clamshell  - 1 x daily - 7 x weekly - 2 sets - 10 reps  - Left Standing Lateral Shift Correction at Wall - Hold  - 1-3 x daily - 7 x weekly - 3 sets - 5 reps    Charges  1 MT, 2 TE       This treatment was provided by PAULINE Victoria under the direct and constant direction and supervision of a licensed therapist, who provided consultation regarding skilled judgements, treatment, and assessment of patient care.

## 2025-04-02 ENCOUNTER — APPOINTMENT (OUTPATIENT)
Dept: PHYSICAL THERAPY | Facility: HOSPITAL | Age: 75
End: 2025-04-02
Attending: FAMILY MEDICINE
Payer: MEDICARE

## 2025-04-04 ENCOUNTER — OFFICE VISIT (OUTPATIENT)
Dept: PHYSICAL THERAPY | Facility: HOSPITAL | Age: 75
End: 2025-04-04
Attending: FAMILY MEDICINE
Payer: MEDICARE

## 2025-04-04 PROCEDURE — 97140 MANUAL THERAPY 1/> REGIONS: CPT

## 2025-04-04 PROCEDURE — 97110 THERAPEUTIC EXERCISES: CPT

## 2025-04-04 NOTE — PROGRESS NOTES
Patient: Ciarra Sethi (74 year old, female) Referring Provider:  Insurance:   Diagnosis: Chronic midline low back pain without sciatica (M54.50,G89.29)  Foraminal stenosis of lumbar region (M48.061) Yeni Devin  MEDICARE   Date of Surgery: 2022 Next MD visit:  [place] [facility]   Precautions:    No data recorded Referral Information:    Date of Evaluation: Req: 0, Auth: 0, Exp:     03/07/25 POC Auth Visits:  10       Today's Date   4/4/2025    Subjective  Pt reports stiffness yesterday, pillow helped on the way in today.       Pain: 8/10     Objective  Pt walked with increased trendelenburg gait pattern due to stiffness. see tx sheet            Assessment  Pt had good participation within the session. Obers stretch was added to increase abductor mobility. Pt required demonstration for monster walks but was able to complete with correct form after. Lateral walks required verbal cueing from SPT to keep toes forward to properly engage musculature without compensation. Initially DLP on the shuttle saw knee valgus B when completing the exercise. Addition of YTB for pt to slightly abduct into prevented knee valgus B. Verbal cueing was also required to keep feet apart when completing exercise. When completing SLP, pt had a harder time completing exercise on the R compared to the L. The R was slower and more of a challenge for the patient. The L was less of an effort and had smoother movement but had mild knee valgus. SPT applied hand held resistance to keep pt within normal alignment and prevent L knee valgus to complete the exercise.    Goals (to be met in 10 visits)     Not Met Progress Toward Partially Met Met   Pt will improve transversus abdominis recruitment to perform proper isometric contraction without requiring verbal or tactile cuing to promote advancement of therex. []  []  []  []    Pt will demonstrate good understanding of proper posture and body mechanics to decrease pain and improve spinal safety. []   []  []  []    Pt will improve lumbar spine AROM flexion to >60 deg to allow increase ease with bending forward to don shoes. []  []  []  []    Pt will have improved hip abductor strength to reduce Trendelenburg gait pattern and improve functional mobility []  []  []  []    Pt will have decreased paraspinal mm tension to tolerate standing >15 minutes for work and home activities. []  []  []  []    Pt will demonstrate improved core strength to be able to perform lifting with <2/10 pain. []  []  []  []    Pt will be independent and compliant with comprehensive HEP to maintain progress achieved in PT []  []  []  []                 Plan  Continue per plan of care.    Treatment Last 4 Visits  Treatment Day: 7       3/19/2025 3/24/2025 3/31/2025 4/4/2025   Spine Treatment   Therapeutic Exercise Nustep, level 5, 5 min   Hip/knee flexion w/ SB, x20   Bridge, legs straight on SB, 2x8  SLR, x8 bilat   BKFO, w/ TA contraction, 2x10 bilat   Side stepping with yellow band, 2 laps in // bars  Fwd/bkwd monster walks with yellow band, 1 laps, in // bars    Nustep, level 5, 5 min   LTR, x10 bilat  Hip/knee flexion w/ SB, x10   Piriformis stretch w/ SB, x10 bilat  Shift correction at wall, going to L, 2x8  SLR, 2x8 bilat   BKFO, w/ TA contraction, 2x8 bilat, green band  Side stepping with yellow band, 4 laps at table     Nustep, level 5, 5 min   LTR, x10 bilat   Hip/knee flexion w/ SB, x10   Piriformis stretch w/ SB, x10 bilat   Bridges w/ SB 2x10  SLR, 2x8 bilat   BKFO, w/ TA contraction, 2x8 bilat, green band   Side stepping with yellow band, 4 laps at table      Nustep, level 5, 5 min   LTR, x10 bilat   Hip/knee flexion w/ SB, x10   SLR, 2x8 bilat   BKFO, w/ TA contraction, 2x12 bilat, green band   Side stepping with RTB 2 laps  Monster walks with RTB 2 laps  DLP 62lb 2x12 YTB to prevent knee valgus   B SLP 37lb 2x8, hand resistance on L to prevent knee valgus       Manual Therapy STM to piriformis and glutes with foam roller  Side  lying R gapping, multiple bouts  STM to R paraspinals    STM to piriformis and glutes  Side lying R gapping, multiple bouts   STM to R paraspinals    STM to piriformis and glutes   Side lying R gapping, multiple bouts   STM to R paraspinals    STM to piriformis and glutes   Side lying R gapping, multiple bouts   STM to R paraspinals   Obers Stretch with abductor STM   Therapeutic Exercise Minutes 25 25 26 25   Manual Therapy Minutes 20 15 15 18   Total Time Of Timed Procedures 45 40 41 43   Total Time Of Service-Based Procedures 0 0 0 0   Total Treatment Time 45 40 41 43   HEP  Access Code: [email or web address]  URL: [email or web address]  Date: 03/24/2025  Prepared by: Lisa Hedin    Exercises  - Supine Lower Trunk Rotation  - 1 x daily - 7 x weekly - 2 sets - 10 reps  - Seated Piriformis Stretch  - 1 x daily - 7 x weekly - 1-2 sets - 4 reps - 15 sec hold  - Supine Piriformis Stretch with Foot on Ground  - 1 x daily - 7 x weekly - 1-2 sets - 4 reps - 15 sec hold  - Clamshell  - 1 x daily - 7 x weekly - 2 sets - 10 reps  - Left Standing Lateral Shift Correction at Wall - Hold  - 1-3 x daily - 7 x weekly - 3 sets - 5 reps          HEP  Access Code: [email or web address]  URL: [email or web address]  Date: 03/24/2025  Prepared by: Lisa Hedin    Exercises  - Supine Lower Trunk Rotation  - 1 x daily - 7 x weekly - 2 sets - 10 reps  - Seated Piriformis Stretch  - 1 x daily - 7 x weekly - 1-2 sets - 4 reps - 15 sec hold  - Supine Piriformis Stretch with Foot on Ground  - 1 x daily - 7 x weekly - 1-2 sets - 4 reps - 15 sec hold  - Clamshell  - 1 x daily - 7 x weekly - 2 sets - 10 reps  - Left Standing Lateral Shift Correction at Wall - Hold  - 1-3 x daily - 7 x weekly - 3 sets - 5 reps    Charges  2 TE, 1 MT           This treatment was provided by PAULINE Victoria under the direct and constant direction and supervision of a licensed therapist, who provided consultation regarding skilled judgements,  treatment, and assessment of patient care.

## 2025-04-07 ENCOUNTER — OFFICE VISIT (OUTPATIENT)
Dept: PHYSICAL THERAPY | Facility: HOSPITAL | Age: 75
End: 2025-04-07
Attending: FAMILY MEDICINE
Payer: MEDICARE

## 2025-04-07 PROCEDURE — 97110 THERAPEUTIC EXERCISES: CPT

## 2025-04-07 PROCEDURE — 97140 MANUAL THERAPY 1/> REGIONS: CPT

## 2025-04-07 NOTE — PROGRESS NOTES
Patient: Ciarra Sethi (74 year old, female) Referring Provider:  Insurance:   Diagnosis: Chronic midline low back pain without sciatica (M54.50,G89.29)  Foraminal stenosis of lumbar region (M48.061) Yeni Devin  MEDICARE   Date of Surgery: 2022 Next MD visit:  [place] [facility]   Precautions:    No data recorded Referral Information:    Date of Evaluation: Req: 0, Auth: 0, Exp:     03/07/25 POC Auth Visits:  10       Today's Date   4/7/2025    Subjective  Pt reports being very stiff and sore after last session. She notes much more pain on the R than normal.       Pain: 8/10     Objective  Pt walked with increased trendelenburg gait pattern in session today. Muscle tightness noted along R paraspinals.           Assessment  Pt had limited participation within session due to pain. STM was applied to R paraspinals to help relieve tension and pt was able to tolerate well. Unilateral PA's grade 2 were applied to TL junction on the R to give nutrtion to the area. Attempted seated QL stretch, however increases pain so discontinued. Demonstration were given for both variations of flexion roll stretch to ensure proper form. The hamstring stretch was given tactile cueing to keep knee extended. Heat was given at the end of the session to help with pain. Pt was educated by SPT and PT to drink a lot of water to help alleviate tension after STM and use heat or ice when pain is present.    Goals (to be met in 10 visits)     Not Met Progress Toward Partially Met Met   Pt will improve transversus abdominis recruitment to perform proper isometric contraction without requiring verbal or tactile cuing to promote advancement of therex. []  []  []  []    Pt will demonstrate good understanding of proper posture and body mechanics to decrease pain and improve spinal safety. []  []  []  []    Pt will improve lumbar spine AROM flexion to >60 deg to allow increase ease with bending forward to don shoes. []  []  []  []    Pt will have  improved hip abductor strength to reduce Trendelenburg gait pattern and improve functional mobility []  []  []  []    Pt will have decreased paraspinal mm tension to tolerate standing >15 minutes for work and home activities. []  []  []  []    Pt will demonstrate improved core strength to be able to perform lifting with <2/10 pain. []  []  []  []    Pt will be independent and compliant with comprehensive HEP to maintain progress achieved in PT []  []  []  []                     Plan  Continue per plan of care    Treatment Last 4 Visits  Treatment Day: 8       3/24/2025 3/31/2025 4/4/2025 4/7/2025   Spine Treatment   Therapeutic Exercise Nustep, level 5, 5 min   LTR, x10 bilat  Hip/knee flexion w/ SB, x10   Piriformis stretch w/ SB, x10 bilat  Shift correction at wall, going to L, 2x8  SLR, 2x8 bilat   BKFO, w/ TA contraction, 2x8 bilat, green band  Side stepping with yellow band, 4 laps at table     Nustep, level 5, 5 min   LTR, x10 bilat   Hip/knee flexion w/ SB, x10   Piriformis stretch w/ SB, x10 bilat   Bridges w/ SB 2x10  SLR, 2x8 bilat   BKFO, w/ TA contraction, 2x8 bilat, green band   Side stepping with yellow band, 4 laps at table      Nustep, level 5, 5 min   LTR, x10 bilat   Hip/knee flexion w/ SB, x10   SLR, 2x8 bilat   BKFO, w/ TA contraction, 2x12 bilat, green band   Side stepping with RTB 2 laps  Monster walks with RTB 2 laps  DLP 62lb 2x12 YTB to prevent knee valgus   B SLP 37lb 2x8, hand resistance on L to prevent knee valgus     Nustep level 5 5 minutes  Sitting QL stretch 1x2  Flexion roll stretch with SB 1x10 L Sidebending variation added 1x10  Shift Corrections 1x10  LTRs 2x12  B Hamstring Stretch 3x20 seconds  Patient Education   Manual Therapy STM to piriformis and glutes  Side lying R gapping, multiple bouts   STM to R paraspinals    STM to piriformis and glutes   Side lying R gapping, multiple bouts   STM to R paraspinals    STM to piriformis and glutes   Side lying R gapping, multiple bouts    STM to R paraspinals   Obers Stretch with abductor STM R Unilateral PA's T10-L2 multiple bouts grade 2  R paraspinals STM   Modalities    Heat to low back 5 minutes   Therapeutic Exercise Minutes 25 26 25 24   Manual Therapy Minutes 15 15 18 16   Hot/Cold Pack Minutes    5   Total Time Of Timed Procedures 40 41 43 40   Total Time Of Service-Based Procedures 0 0 0 5   Total Treatment Time 40 41 43 45   HEP Access Code: [email or web address]  URL: [email or web address]  Date: 03/24/2025  Prepared by: Lisa Hedin    Exercises  - Supine Lower Trunk Rotation  - 1 x daily - 7 x weekly - 2 sets - 10 reps  - Seated Piriformis Stretch  - 1 x daily - 7 x weekly - 1-2 sets - 4 reps - 15 sec hold  - Supine Piriformis Stretch with Foot on Ground  - 1 x daily - 7 x weekly - 1-2 sets - 4 reps - 15 sec hold  - Clamshell  - 1 x daily - 7 x weekly - 2 sets - 10 reps  - Left Standing Lateral Shift Correction at Wall - Hold  - 1-3 x daily - 7 x weekly - 3 sets - 5 reps           HEP  Access Code: [email or web address]  URL: [email or web address]  Date: 03/24/2025  Prepared by: Lisa Hedin    Exercises  - Supine Lower Trunk Rotation  - 1 x daily - 7 x weekly - 2 sets - 10 reps  - Seated Piriformis Stretch  - 1 x daily - 7 x weekly - 1-2 sets - 4 reps - 15 sec hold  - Supine Piriformis Stretch with Foot on Ground  - 1 x daily - 7 x weekly - 1-2 sets - 4 reps - 15 sec hold  - Clamshell  - 1 x daily - 7 x weekly - 2 sets - 10 reps  - Left Standing Lateral Shift Correction at Wall - Hold  - 1-3 x daily - 7 x weekly - 3 sets - 5 reps    Charges  2 TE, 1 MT       This treatment was provided by PAULINE Victoria under the direct and constant direction and supervision of a licensed therapist, who provided consultation regarding skilled judgements, treatment, and assessment of patient care.

## 2025-04-09 ENCOUNTER — APPOINTMENT (OUTPATIENT)
Dept: PHYSICAL THERAPY | Facility: HOSPITAL | Age: 75
End: 2025-04-09
Attending: FAMILY MEDICINE
Payer: MEDICARE

## 2025-04-11 ENCOUNTER — OFFICE VISIT (OUTPATIENT)
Dept: PHYSICAL THERAPY | Facility: HOSPITAL | Age: 75
End: 2025-04-11
Attending: FAMILY MEDICINE
Payer: MEDICARE

## 2025-04-11 PROCEDURE — 97110 THERAPEUTIC EXERCISES: CPT

## 2025-04-11 PROCEDURE — 97140 MANUAL THERAPY 1/> REGIONS: CPT

## 2025-04-11 NOTE — PROGRESS NOTES
Patient: Ciarra Sethi (74 year old, female) Referring Provider:  Insurance:   Diagnosis: Chronic midline low back pain without sciatica (M54.50,G89.29)  Foraminal stenosis of lumbar region (M48.061) Yeni Devin  MEDICARE   Date of Surgery: 2022 Next MD visit:  [facility] [place] [facility]   Precautions:    No data recorded Referral Information:    Date of Evaluation: Req: 0, Auth: 0, Exp:     03/07/25 POC Auth Visits:  10       Today's Date   4/11/2025    Subjective  Pt reports feeling better today.       Pain: 4/10     Objective  Pt had decreased tension in R piriformis, glutes and abductors compared to last session. See tx sheet.            Assessment  Pt had great participation within session. STM was applied to R gluteal muscles, abductors and piriformis and pt had good toleration. Less tension was noticed within these muscles compared to prior session. Pt completed bridges with RTB and needed cueing from SPT to keep resistance within band and keep feet farther apart. 3 way hip was completed with a RTB above knees. On the R, the pt was able to adequately complete movement with little cueing after demonstration. On the L the pt could barely initiate movement into abduction and got slight movement with flexion and extension. Exercise was adjusted to have patient complete movement without TB and go against gravity. Clamshells was upgraded to a RTB to further strengthen the patient. Pt then completed DLP on the shuttle. A RTB was applied to reduce B knee valgus. SPT used verbal cueing to remind pt to keep resistance within the band. SLP also had knee valgus present B. A TB was applied to the medial knee to reduce valgus while completing exercise and this helped the pt produce adequate form within the exercise. HEP was updated and pt was educated on using HEP and monitoring symptoms till next session.    Goals (to be met in 10 visits)       Not Met Progress Toward Partially Met Met   Pt will improve transversus abdominis  recruitment to perform proper isometric contraction without requiring verbal or tactile cuing to promote advancement of therex. []  []  []  []    Pt will demonstrate good understanding of proper posture and body mechanics to decrease pain and improve spinal safety. []  []  []  []    Pt will improve lumbar spine AROM flexion to >60 deg to allow increase ease with bending forward to don shoes. []  []  []  []    Pt will have improved hip abductor strength to reduce Trendelenburg gait pattern and improve functional mobility []  []  []  []    Pt will have decreased paraspinal mm tension to tolerate standing >15 minutes for work and home activities. []  []  []  []    Pt will demonstrate improved core strength to be able to perform lifting with <2/10 pain. []  []  []  []    Pt will be independent and compliant with comprehensive HEP to maintain progress achieved in PT []  []  []  []                     Plan  Continue per plan of care    Treatment Last 4 Visits  Treatment Day: 9       3/31/2025 4/4/2025 4/7/2025 4/11/2025   Spine Treatment   Therapeutic Exercise Nustep, level 5, 5 min   LTR, x10 bilat   Hip/knee flexion w/ SB, x10   Piriformis stretch w/ SB, x10 bilat   Bridges w/ SB 2x10  SLR, 2x8 bilat   BKFO, w/ TA contraction, 2x8 bilat, green band   Side stepping with yellow band, 4 laps at table      Nustep, level 5, 5 min   LTR, x10 bilat   Hip/knee flexion w/ SB, x10   SLR, 2x8 bilat   BKFO, w/ TA contraction, 2x12 bilat, green band   Side stepping with RTB 2 laps  Monster walks with RTB 2 laps  DLP 62lb 2x12 YTB to prevent knee valgus   B SLP 37lb 2x8, hand resistance on L to prevent knee valgus     Nustep level 5 5 minutes  Sitting QL stretch 1x2  Flexion roll stretch with SB 1x10 L Sidebending variation added 1x10  Shift Corrections 1x10  LTRs 2x12  B Hamstring Stretch 3x20 seconds  Patient Education Nustep 5 minutes level 5  Bridges with RTB around knees 2x10  B 3 way hip RTB 1x12, L had RTB discontinued due to  strength deficits.   L 3 way hip AROM 1x12  B Clamshells 2x8  DLP #62lbs 2x12 RTB to prevent valgus   B SLP #37lb 2x8 RTB to prevent valgus  Updated HEP education   Manual Therapy STM to piriformis and glutes   Side lying R gapping, multiple bouts   STM to R paraspinals    STM to piriformis and glutes   Side lying R gapping, multiple bouts   STM to R paraspinals   Obers Stretch with abductor STM R Unilateral PA's T10-L2 multiple bouts grade 2  R paraspinals STM STM with roller to R piriformis, glutes and hip abductors   Modalities   Heat to low back 5 minutes    Therapeutic Exercise Minutes 26 25 24 27   Manual Therapy Minutes 15 18 16 15   Hot/Cold Pack Minutes   5    Total Time Of Timed Procedures 41 43 40 42   Total Time Of Service-Based Procedures 0 0 5 0   Total Treatment Time 41 43 45 42        HEP  Access Code: [email or web address]  URL: [email or web address]  Date: 03/24/2025  Prepared by: Lisa Weber    Exercises  - Supine Lower Trunk Rotation  - 1 x daily - 7 x weekly - 2 sets - 10 reps  - Seated Piriformis Stretch  - 1 x daily - 7 x weekly - 1-2 sets - 4 reps - 15 sec hold  - Supine Piriformis Stretch with Foot on Ground  - 1 x daily - 7 x weekly - 1-2 sets - 4 reps - 15 sec hold  - Clamshell  - 1 x daily - 7 x weekly - 2 sets - 10 reps  - Left Standing Lateral Shift Correction at Wall - Hold  - 1-3 x daily - 7 x weekly - 3 sets - 5 reps    Charges  2 TE, 1 MT         This treatment was provided by PAULINE Victoria under the direct and constant direction and supervision of a licensed therapist, who provided consultation regarding skilled judgements, treatment, and assessment of patient care.

## 2025-04-16 ENCOUNTER — APPOINTMENT (OUTPATIENT)
Dept: PHYSICAL THERAPY | Facility: HOSPITAL | Age: 75
End: 2025-04-16
Attending: FAMILY MEDICINE
Payer: MEDICARE

## 2025-04-21 ENCOUNTER — MED REC SCAN ONLY (OUTPATIENT)
Dept: FAMILY MEDICINE CLINIC | Facility: CLINIC | Age: 75
End: 2025-04-21

## 2025-05-02 ENCOUNTER — TELEPHONE (OUTPATIENT)
Dept: FAMILY MEDICINE CLINIC | Facility: CLINIC | Age: 75
End: 2025-05-02

## 2025-05-02 NOTE — TELEPHONE ENCOUNTER
Amlodipine Olmasartan last refilled 12/19/24 #90 with 1 refill  Spoke with [facility] and they have refill and will fill  Pt notified

## 2025-05-02 NOTE — TELEPHONE ENCOUNTER
Medication and dose requested: Amlodipine    Pharmacy name/location:    [id number] - [place], [place] - [address] AT [facility] [address], [phone], [phone] [address] [place] [address]  Phone: [phone] Fax: [phone]    Additional notes:   Pt only has 5 left pharmacy told her there are no more refills

## 2025-05-08 ENCOUNTER — TELEPHONE (OUTPATIENT)
Dept: DERMATOLOGY | Age: 75
End: 2025-05-08

## 2025-05-08 DIAGNOSIS — L30.9 ECZEMA, UNSPECIFIED TYPE: Primary | ICD-10-CM

## 2025-05-08 RX ORDER — TRIAMCINOLONE ACETONIDE 40 MG/ML
60 INJECTION, SUSPENSION INTRA-ARTICULAR; INTRAMUSCULAR
Status: SHIPPED | OUTPATIENT
Start: 2025-05-08 | End: 2026-05-03

## 2025-05-12 ENCOUNTER — APPOINTMENT (OUTPATIENT)
Dept: DERMATOLOGY | Age: 75
End: 2025-05-12

## 2025-05-12 DIAGNOSIS — L30.9 ECZEMA, UNSPECIFIED TYPE: Primary | ICD-10-CM

## 2025-05-12 RX ADMIN — TRIAMCINOLONE ACETONIDE 60 MG: 40 INJECTION, SUSPENSION INTRA-ARTICULAR; INTRAMUSCULAR at 11:25

## 2025-05-16 ENCOUNTER — OFFICE VISIT (OUTPATIENT)
Dept: PHYSICAL THERAPY | Facility: HOSPITAL | Age: 75
End: 2025-05-16
Attending: FAMILY MEDICINE
Payer: MEDICARE

## 2025-05-16 PROCEDURE — 97110 THERAPEUTIC EXERCISES: CPT

## 2025-05-16 NOTE — PROGRESS NOTES
Patient: Ciarra Sethi (74 year old, female) Referring Provider:  Insurance:   Diagnosis: Chronic midline low back pain without sciatica (M54.50,G89.29)  Foraminal stenosis of lumbar region (M48.061) Yeni Devin  MEDICARE   Date of Surgery:  Next MD visit:  [place] [facility]   Precautions:    No data recorded Referral Information:    Date of Evaluation: Req: 8, Auth: 8, Exp: 3/19/2025    No data recorded POC Auth Visits:  10       Today's Date   2025      DischargeSummary  Pt has attended 10 visits in Physical Therapy.         Subjective  Pt reports feeling much better and isn't using her cane as much.       Pain: 3/10     Objective  Pt came into session without use of cane. Objective measurements taken.       Observation/Posture: trunk shift; pelvic asymmetry (increased gluteal muscle mass on R versus L, asymmetrical shoulder/pelvic alignment)              Special tests:   SLR: L 65 deg, R 70 degrees  JONAH: now negative R Leg length discrepancy; R shorter than L       ROM and Strength:  (* denotes performed with pain)         Trunk ROM MMT (-/5)     Flex 65      Ext 10      R L R L     Side bend WFL WFL       Rotation WFL WFL     ,          Hip    ROM MMT (-/5)    R L R L     Flex (L2)   4- 4-     Ext  Limited Limited 3 4+     Abd   3+* 4+     ER WFL WFL 4* 4+     IR WFL WFL 4* 4+               ,          Knee    ROM MMT (-/5)    R L R L     Flex   4+ 5     Ext (L3)   5 5      ,          Ankle/Foot    ROM MMT (-/5)    R L R L     PF   5 5     DF (L4)   5 5         Flexibility:  LE Flexibility R L     Hip Flexor       Hamstrings mod restricted mod restricted     ITB       Piriformis min restricted min restricted     Quads       Gastroc-soleus        Extension MMT in Stand[place]                  Balance and Functional Mobility:  Gait: pt ambulates on level ground with trendelenburg/waddle (heavy L ankle pronation during sta     Assessment  Pt ambulated into session without use of cane. She noted she's  been doing that frequently and doesn't feel she needs to rely on it. Pt still had trendelenburg gait pattern but it was not as significant as compared to prior sessions. When standing, there was less of a shift to the R. Significant imporvements in trunk ROM were made. Some improvements in LE strength were made as well. Pt noted concerns when completing bridges. SPT informed her she is using correct form and to continue them in her HEP. Pt was educated on importance of continuing HEP and staying active to continue making improvements.    Goals (to be met in 10 visits)       Not Met Progress Toward Partially Met Met   Pt will improve transversus abdominis recruitment to perform proper isometric contraction without requiring verbal or tactile cuing to promote advancement of therex. []  []  []  [x]    Pt will demonstrate good understanding of proper posture and body mechanics to decrease pain and improve spinal safety. []  []  []  [x]    Pt will improve lumbar spine AROM flexion to >60 deg to allow increase ease with bending forward to don shoes. []  []  []  [x]    Pt will have improved hip abductor strength to reduce Trendelenburg gait pattern and improve functional mobility []  []  []  [x]    Pt will have decreased paraspinal mm tension to tolerate standing >15 minutes for work and home activities. []  []  []  [x]    Pt will demonstrate improved core strength to be able to perform lifting with <2/10 pain. []  []  [x]  []    Pt will be independent and compliant with comprehensive HEP to maintain progress achieved in PT []  []  []  [x]                         Plan  Discharge    Treatment Last 4 Visits  Treatment Day: 10       4/4/2025 4/7/2025 4/11/2025 5/16/2025   Spine Treatment   Therapeutic Exercise Nustep, level 5, 5 min   LTR, x10 bilat   Hip/knee flexion w/ SB, x10   SLR, 2x8 bilat   BKFO, w/ TA contraction, 2x12 bilat, green band   Side stepping with RTB 2 laps  Monster walks with RTB 2 laps  DLP 62lb 2x12 YTB  to prevent knee valgus   B SLP 37lb 2x8, hand resistance on L to prevent knee valgus     Nustep level 5 5 minutes  Sitting QL stretch 1x2  Flexion roll stretch with SB 1x10 L Sidebending variation added 1x10  Shift Corrections 1x10  LTRs 2x12  B Hamstring Stretch 3x20 seconds  Patient Education Nustep 5 minutes level 5  Bridges with RTB around knees 2x10  B 3 way hip RTB 1x12, L had RTB discontinued due to strength deficits.   L 3 way hip AROM 1x12  B Clamshells 2x8  DLP #62lbs 2x12 RTB to prevent valgus   B SLP #37lb 2x8 RTB to prevent valgus  Updated HEP education Nustep lvl 5 5 mins  Bridges 1x10  Objective measurements  Discharge education   Manual Therapy STM to piriformis and glutes   Side lying R gapping, multiple bouts   STM to R paraspinals   Obers Stretch with abductor STM R Unilateral PA's T10-L2 multiple bouts grade 2  R paraspinals STM STM with roller to R piriformis, glutes and hip abductors    Modalities  Heat to low back 5 minutes     Therapeutic Exercise Minutes 25 24 27 35   Manual Therapy Minutes 18 16 15    Hot/Cold Pack Minutes  5     Total Time Of Timed Procedures 43 40 42 35   Total Time Of Service-Based Procedures 0 5 0 0   Total Treatment Time 43 45 42 35   HEP   Access Code: [email or web address] URL: [email or web address] Date: 04/11/2025 Prepared by: Lisa Weber Exercises - Supine Lower Trunk Rotation  - 1 x daily - 7 x weekly - 2 sets - 10 reps - Seated Piriformis Stretch  - 1 x daily - 7 x weekly - 1-2 sets - 4 reps - 15 sec hold - Supine Piriformis Stretch with Foot on Ground  - 1 x daily - 7 x weekly - 1-2 sets - 4 reps - 15 sec hold - Clamshell  - 1 x daily - 7 x weekly - 2 sets - 10 reps - Left Standing Lateral Shift Correction at Wall - Hold  - 1-3 x daily - 7 x weekly - 3 sets - 5 reps - Standing 3-Way Leg Reach with Resistance at Ankles and Counter Support  - 1 x daily - 7 x weekly - 2 sets - 10 reps - Bridge with Hip Abduction and Resistance  - 1 x daily - 7 x weekly - 2  sets - 10 reps            [facility]  Access Code: [email or web address] URL: [email or web address] Date: 04/11/2025 Prepared by: Lisa Weber Exercises - Supine Lower Trunk Rotation  - 1 x daily - 7 x weekly - 2 sets - 10 reps - Seated Piriformis Stretch  - 1 x daily - 7 x weekly - 1-2 sets - 4 reps - 15 sec hold - Supine Piriformis Stretch with Foot on Ground  - 1 x daily - 7 x weekly - 1-2 sets - 4 reps - 15 sec hold - Clamshell  - 1 x daily - 7 x weekly - 2 sets - 10 reps - Left Standing Lateral Shift Correction at Wall - Hold  - 1-3 x daily - 7 x weekly - 3 sets - 5 reps - Standing 3-Way Leg Reach with Resistance at Ankles and Counter Support  - 1 x daily - 7 x weekly - 2 sets - 10 reps - Bridge with Hip Abduction and Resistance  - 1 x daily - 7 x weekly - 2 sets - 10 reps       Charges  2 TE       This treatment was provided by PAULINE Victoria under the direct and constant direction and supervision of a licensed therapist, who provided consultation regarding skilled judgements, treatment, and assessment of patient care.             Post Oswestry Disability Index Score  Post Score: (Patient-Rptd) 10 % (5/16/2025  1:47 PM)    24 % improvement    Plan: Discharge    Patient/Family/Caregiver was advised of these findings, precautions, and treatment options and has agreed to actively participate in planning and for this course of care.    Thank you for your referral. If you have any questions, please contact me at Dept: [phone].    Sincerely,  Electronically signed by therapist: PAULINE Victoria      Please co-sign or sign and return this letter via fax as soon as possible to [phone].   I certify the need for these services furnished under this plan of treatment and while under my care.    X___________________________________________________ Date____________________    Certification From: 5/16/2025  To:8/14/2025

## 2025-06-12 ENCOUNTER — OFFICE VISIT (OUTPATIENT)
Dept: INTERNAL MEDICINE CLINIC | Facility: CLINIC | Age: 75
End: 2025-06-12
Payer: MEDICARE

## 2025-06-12 VITALS
BODY MASS INDEX: 37.5 KG/M2 | OXYGEN SATURATION: 98 % | SYSTOLIC BLOOD PRESSURE: 134 MMHG | DIASTOLIC BLOOD PRESSURE: 62 MMHG | WEIGHT: 191 LBS | HEART RATE: 99 BPM | HEIGHT: 60 IN | RESPIRATION RATE: 18 BRPM

## 2025-06-12 DIAGNOSIS — M54.50 CHRONIC MIDLINE LOW BACK PAIN WITHOUT SCIATICA: ICD-10-CM

## 2025-06-12 DIAGNOSIS — I10 ESSENTIAL HYPERTENSION: ICD-10-CM

## 2025-06-12 DIAGNOSIS — E55.9 VITAMIN D DEFICIENCY: ICD-10-CM

## 2025-06-12 DIAGNOSIS — Z51.81 ENCOUNTER FOR THERAPEUTIC DRUG MONITORING: Primary | ICD-10-CM

## 2025-06-12 DIAGNOSIS — G89.29 CHRONIC MIDLINE LOW BACK PAIN WITHOUT SCIATICA: ICD-10-CM

## 2025-06-12 DIAGNOSIS — E53.8 VITAMIN B12 DEFICIENCY: ICD-10-CM

## 2025-06-12 DIAGNOSIS — I10 ESSENTIAL HYPERTENSION, BENIGN: ICD-10-CM

## 2025-06-12 DIAGNOSIS — E66.811 OBESITY (BMI 30.0-34.9): ICD-10-CM

## 2025-06-12 PROCEDURE — 99214 OFFICE O/P EST MOD 30 MIN: CPT | Performed by: NURSE PRACTITIONER

## 2025-06-12 RX ORDER — SEMAGLUTIDE 0.5 MG/.5ML
0.5 INJECTION, SOLUTION SUBCUTANEOUS WEEKLY
Qty: 4 EACH | Refills: 1 | Status: SHIPPED | OUTPATIENT
Start: 2025-07-03

## 2025-06-12 RX ORDER — SEMAGLUTIDE 0.25 MG/.5ML
0.25 INJECTION, SOLUTION SUBCUTANEOUS WEEKLY
Qty: 2 ML | Refills: 0 | Status: SHIPPED | OUTPATIENT
Start: 2025-06-12 | End: 2025-07-04

## 2025-06-12 NOTE — PROGRESS NOTES
HISTORY OF PRESENT ILLNESS  Chief Complaint   Patient presents with    Weight Check     Up 4 lb  2/24/25     Ciarra Sethi is a 74 year old female here for follow up with medical weight loss program for the treatment of overweight, obesity, or morbid obesity.     Up #4 lbs lbs follow-up from 2/2025  Compliant on zepbound 2.5mg weekly (from [facility])  Tolerating well, didn't feel like she helping with decreasing appetite and no side effects     Would ideally like to go back to semaglutide (felt like it worked better) for weight loss   Challenging: needs for improvement: exercise  Is going to back to [facility] (July 1, 2025)   Just finished PT for back   Still dealing with stress- with  and his medical illnesses  Exercise/Activity: 7 days per week walking- limited due to joint pain   Nutrition: eating regular meals, +protein, minimal veggies. not tracking reports  Meals out per week on average: 0  Stress is manageable   Sleep: 3 hours/night, waking up feeling rested    Denies chest pain, shortness of breath, dizziness, blurred vision, headache, paresthesia, nausea/vomiting.     Breakfast Lunch Dinner Snacks Fluids   Reviewed              Wt Readings from Last 6 Encounters:   06/12/25 191 lb (86.6 kg)   02/24/25 187 lb (84.8 kg)   12/26/24 187 lb (84.8 kg)   12/19/24 186 lb 6.4 oz (84.6 kg)   12/16/24 186 lb (84.4 kg)   08/15/24 191 lb (86.6 kg)          REVIEW OF SYSTEMS  GENERAL: feels well otherwise, denied any fevers chills or night sweats   LUNGS: denies shortness of breath  CARDIOVASCULAR: denies chest pain  GI: denies abdominal pain  MUSCULOSKELETAL: +back pain, +joint pains   PSYCH: denies change in behavior or mood, denies feeling sad or depressed    EXAM  /62   Pulse 99   Resp 18   Ht 5' (1.524 m)   Wt 191 lb (86.6 kg)   SpO2 98%   BMI 37.30 kg/m²       GENERAL: well developed, well nourished, in no apparent distress, A/O x3  SKIN: no rashes, no suspicious lesions  HEENT:  atraumatic, normocephalic, OP-clear, PERRLA  NECK: supple, no adenopathy  LUNGS: CTA in all fields, breathing non labored  CARDIO: RRR without murmur  GI: +BS, NT/ND, no masses or HSM  EXTREMITIES: no cyanosis, no clubbing, no edema    Lab Results   Component Value Date     (H) 12/19/2024    BUN 13 12/19/2024    BUNCREA 14.6 03/05/2021    CREATSERUM 0.77 12/19/2024    ANIONGAP 7 12/19/2024    GFRNAA 84 07/01/2022    GFRAA 97 07/01/2022    CA 9.7 12/19/2024    OSMOCALC 279 12/19/2024    ALKPHO 91 12/19/2024    AST 27 12/19/2024    ALT 24 12/19/2024    BILT 0.6 12/19/2024    TP 7.4 12/19/2024    ALB 4.5 12/19/2024    GLOBULIN 2.9 12/19/2024    AGRATIO 1.7 05/16/2013     (L) 12/19/2024    K 4.5 12/19/2024    CL 99 12/19/2024    CO2 28.0 12/19/2024     Lab Results   Component Value Date     12/19/2024    A1C 5.1 12/19/2024     Lab Results   Component Value Date    CHOLEST 193 12/19/2024    TRIG 69 12/19/2024    HDL 89 (H) 12/19/2024    LDL 91 12/19/2024    VLDL 11 12/19/2024    NONHDLC 104 12/19/2024     Lab Results   Component Value Date    B12 427 06/04/2019     Lab Results   Component Value Date    VITD 34.22 09/25/2018       Medications Ordered Prior to Encounter[1]    ASSESSMENT/PLAN    ICD-10-CM    1. Encounter for therapeutic drug monitoring  Z51.81       2. Obesity (BMI 30.0-34.9)  E66.811       3. Essential hypertension, benign  I10       4. Vitamin B12 deficiency  E53.8       5. Essential hypertension  I10       6. Vitamin D deficiency  E55.9       7. Chronic midline low back pain without sciatica  M54.50     G89.29           PLAN   Initial Weight Data and Goal Weight Loss:  Initial consult: #198 lbs on 12/2018  Weight Calculations  Initial Weight: 198 lbs  Initial Weight Date: 12/01/18  Today's Weight: 191 lbs  5% Goal: 9.9  10% Goal: 19.8  Total Weight Loss: 7 lbs  Total weight loss: up #4 lbs total, Net loss 7 lbs  Had stopped medications: zepbound 2.5mg weekly ([facility])   Will trial  medications: Wegovy 0.25 mg weekly x 4 weeks and increase to 0.5 mg weekly x 8 weeks to [facility]  --advised of side effects and adverse effects of this medication  Contradictions: has done plenity in the past, has done lomaria, topamax, and stopped contrave (due to nausea), ozempic, compound semaglutide, rybelsus  Reviewed labs   Continue with vitamin d OTC  HTN  blood pressure is in office is stable - continue present management  Chronic back pain (hopefully will restart doing [facility]a therapy [facility] at [facility])   Stress is high, see HPI  Wrote out macros and encouraged tracking food  Nutrition: Low carb diet, recommended to eat breakfast daily/ regular protein intake  Follow up with dietitian and psychologist as recommended.  Discussed the role of sleep and stress in weight management.  Counseled on comprehensive weight loss plan including attention to nutrition, exercise and behavior/stress management for success. See patient instruction below for more details.  Discussed strategies to overcome barriers to successful weight loss and weight maintenance  FITTE: ACSM recommendations (150-300 minutes/ week in active weight loss)   Weight Loss Consent to treat reviewed and signed.    Total time spent on chart review, pre-charting, obtaining history, counseling, and educating, reviewing labs was 30 minutes.       NOTE TO PATIENT: The 21st Century Cures Act makes clinical notes like these available to patients in the interest of transparency. Clinical notes are medical documents used by physicians and care providers to communicate with each other. These documents include medical language and terminology, abbreviations, and treatment information that may sound technical and at times possibly unfamiliar. In addition, at times, the verbiage may appear blunt or direct. These documents are one tool providers use to communicate relevant information and clinical opinions of the care providers in a way that allows common understanding  of the clinical context.     There are no Patient Instructions on file for this visit.    No follow-ups on file.    Patient verbalizes understanding.    LUISA Joiner             [1]   Current Outpatient Medications on File Prior to Visit   Medication Sig Dispense Refill    colchicine 0.6 MG Oral Tab Take 1 tablet (0.6 mg total) by mouth daily. 90 tablet 1    amLODIPine-Olmesartan 5-40 MG Oral Tab Take 1 tablet by mouth daily. 90 tablet 1    naproxen 500 MG Oral Tab Take 0.5 tablets (250 mg total) by mouth 2 (two) times daily as needed (for back and hand pain). Take with food and water. Stop and call if GI side effects 60 tablet 0    betamethasone dipropionate 0.05 % External Lotion APPLY TWICE DAILY STRICTLY TO THE AFFECTED AREA FOR 2 WEEKS. AVOID USE ON FACE AND BODY FOLDS.      Tirzepatide-Weight Management (ZEPBOUND) 2.5 MG/0.5ML Subcutaneous Solution Inject 2.5 mg into the skin once a week. Inject 2.5mg into the skin q weekly (Patient not taking: Reported on 6/12/2025) 2 mL 3     No current facility-administered medications on file prior to visit.

## 2025-06-12 NOTE — PATIENT INSTRUCTIONS
Next steps:  1.  Fill your prescribed medication and take as discussed and prescribed: wegovy 0.25mg weekly x4 weeks and then increase wegovy 0.5mg weekly x 8 weeks  2.  Schedule a personal nutrition consultation with one of our registered dieticians  3. Join aqua therapy class      Please try to work on the following dietary changes:  Daily protein recommendation to start:  grams  Daily carbohydrate: <100g  Daily calories: 1,300  1.  Drink water with meals and throughout the day, cut down on soda and/or juice if consumed. Consider flavored water options like Bubbly, Spindrift, Hint and Maryann.  2.  Eat breakfast daily and focus on having protein with each meal, examples include: greek yogurt, cottage cheese, hard boiled egg, whole grain toast with peanut butter.   3.  Reduce refined carbohydrates and sugars which includes items such as sweets, as well as rice, pasta, and bread and make sure to choose whole grain options when having them with just 1 serving per meal about the size of your inner palm.  4.  Consume non starchy veggies daily working towards making them a good 50% of your daily food intake. Add them to lunch and dinner consistently.  5.  Start a daily probiotic: VSL#3 is recommended, (order on line at www.vsl3.com). Take 1 capsule daily with water for 30 days, then reduce to 1 every other day (this will reduce the cost). Capsules can be left out for 2 weeks, but then must be refrigerated.      Please download [place] My Fitness Pal, LoseIt! Or Net Diary to monitor daily dietary intake and you will be able to see if you are eating the right amount of calories or too much or too little which would hinder weight loss. Additionally this will help to see your daily carbohydrate and protein intake. When you set the [place] up choose 1-2 lbs/week as a goal.  Keeping a paper food journal is an option as well to remain accountable for your choices- this is the start to mindful eating! A low calorie diet has been  consistently shown to support weight loss.     Continue or start exercising to help establish a routine. If not already exercising begin with 1 day and progress as able with long-term goal of 30 minutes 5 days a week at a minimum.     Meditation daily can help manage and control stress. Chronic stress can make weight loss difficult.  Exercising is one way to help with stress, but meditation using the CALM Clare or another comparable alternative can be done in your home or place of work with little time commitment. This Clare can also help work on behavior change and improve sleep. Check out the segment under Calm Masterclass and listen to The 4 Pillars of Health. A great way to begin learning about the foundation of lifestyle with practical tips to use in your every day.     Check out www.yourweightmatters.org blog for continued daily support and education along this weight loss journey!    Patient Resources:     Personal Training/Fitness Classes/Health Coaching     [facility] @ [email or web address] Full fitness center with group fitness and personal training. Discount available as client of [facility].  Health Coaching and Personal Training with Adali Leo at our [facility]- individual weekly coaching with option to add personal training and small group fitness classes targeted at weight loss- [phone] and/or email @ [email or web address]  [employer] [place] [email or web address] Group Fitness [phone] and/or email Nannette at [email or web address]  [facility] Fitness Centers with multiple locations: [employer] (www.Kids Calendar), Eat The Frog Fitness (www."[employer].ASPIRE Beverages), Fit Body Bootcamp (www.FIRE1bodybootcamp.ASPIRE Beverages), Socialeyes App Fitness (www.SeeFuture.ASPIRE Beverages), The Exercise  (www.exercisecoach.ASPIRE Beverages)     Online Fitness  Fitness  on [employer]  Fit in 10 DVD series-  www.thsqz45JVD.Yuyuto  Sit and Be Fit - Chair exercise series Www.sitandbefit.org  Hip Hop Fit with Dominic Martínez at www.hiphopfit.net     Apps for on the Go Fitness  [place] 7 Minute Workout (orange box with white 7) - free on the go HIIT training clare  Peloton Clare @ www.onepeloton.com     Nutrition Trackers and Tools  LoseIT! And My Fitness Pal apps and on line for tracking nutrition  NOOM - virtual health coaching  FitFoundation (healthy meals on the go) in [place] @ www.twtabzornpnjs0hLoylty Rewardz Management  Bissaulo DOUGLAS @ wwwBoston Logicbistromd.com and Ykxlrv48 (keto and low carb plans recommended) @ www.wkalmx47.com, Metabolic Meals @ www.MyMetabolicMeals.com - individual prepared meals to go  Gobble, Blue Apron, Home , Every Plate, Sunbasket- on line meal delivery programs for preparation at home  [place] in [place] for homemade meals to go @ wwwBoston LogicmealFunplus  Diet Doctor @ www.dietdoctor.com - low carb swaps  Yummly - meal prep and planning clare (www.yummly.com)     Stress Management/Behavior/Mindful Eating  CALM meditation clare (www.calm.com)  Headspace  Am I Hungry? Mindful eating virtual  clare  Www.yourweightmatters.org - Obesity Action Coalition sponsored Blog posts daily  Motivation clare (black box with white \")- daily supportive messages sent to your phone     Books/Video Education/Podcasts  Mindless Eating by Etienne Bae  Why We Get Sick by Sanket Esqueda (a book about insulin resistance)  Atomic Habits by Itz Lynch (a book about taking small steps to promote greater behavior change)   Can't Hurt Me by Ashvin Benitez (a book exploring the power of discipline in achieving your goals)  The End of Dieting: How to Live for Life by Dr. Doni Howard M.D. or listen to The Dinamundo Podcast Episode 63: Understanding \"Nutritarian\" Eating w/Dr. Doni Howard  Your Body in Balance: The New Science of Food, Hormones, and Health by Dr. Schuyler Bob  The Menopause Diet Plan by Vane Lau and Deloris Koroma  The Complete  Guide to fasting by Dr. Chaudhry  Sugar, Salt & Fat by Karey Talbert, Ph.D, R.D.  Weight Loss Surgery Will Not Treat Food Addiction by Lindsey Gee Ph.D  The Game Changers- Netflix Documentary on plant based nutrition  Fed Up - documentary about obesity (Free on Utube)  The Truth About Sugar - documentary on sugar (Free on Utube, https://youtu.be/1A8ybvaSB6o)  The Dr. Segundo T5 Wellness Plan by Dr. Gonzalo Segundo MD  Fitlosophy Fitspiration - journal to better health (found at [employer] in fitness aisle)  What Happened to You?- a look at the impact trauma has on behavior written by Amrik Herrera and Dr. Ulises Daly  Whole Again by Branden Jung - discovering your true self after trauma  Aurea Pinon talk on Netflix, The Call to Courage  Podcasts: The Exam Room by the Physician's Committee, Nutrition Facts by Dr. Pagan    We are here to support you with weight loss, but please remember that you still need your primary care provider for your routine health maintenance.

## 2025-07-14 ENCOUNTER — OFFICE VISIT (OUTPATIENT)
Dept: FAMILY MEDICINE CLINIC | Facility: CLINIC | Age: 75
End: 2025-07-14
Payer: MEDICARE

## 2025-07-14 VITALS
BODY MASS INDEX: 32.44 KG/M2 | DIASTOLIC BLOOD PRESSURE: 60 MMHG | SYSTOLIC BLOOD PRESSURE: 126 MMHG | HEIGHT: 64.4 IN | TEMPERATURE: 97 F | RESPIRATION RATE: 21 BRPM | HEART RATE: 95 BPM | OXYGEN SATURATION: 98 % | WEIGHT: 192.38 LBS

## 2025-07-14 DIAGNOSIS — M19.041 PRIMARY OSTEOARTHRITIS OF BOTH HANDS: ICD-10-CM

## 2025-07-14 DIAGNOSIS — R73.01 ELEVATED FASTING GLUCOSE: ICD-10-CM

## 2025-07-14 DIAGNOSIS — M19.042 PRIMARY OSTEOARTHRITIS OF BOTH HANDS: ICD-10-CM

## 2025-07-14 DIAGNOSIS — R74.8 ELEVATED LIVER ENZYMES: ICD-10-CM

## 2025-07-14 DIAGNOSIS — Z87.898 HISTORY OF PREDIABETES: ICD-10-CM

## 2025-07-14 DIAGNOSIS — Z91.81 AT HIGH RISK FOR FALLS: ICD-10-CM

## 2025-07-14 DIAGNOSIS — M10.9 GOUTY ARTHRITIS: ICD-10-CM

## 2025-07-14 DIAGNOSIS — K76.0 FATTY LIVER: ICD-10-CM

## 2025-07-14 DIAGNOSIS — M15.0 PRIMARY OSTEOARTHRITIS INVOLVING MULTIPLE JOINTS: ICD-10-CM

## 2025-07-14 DIAGNOSIS — I10 ESSENTIAL HYPERTENSION, BENIGN: ICD-10-CM

## 2025-07-14 DIAGNOSIS — R63.5 WEIGHT GAIN: ICD-10-CM

## 2025-07-14 DIAGNOSIS — Z12.11 SCREEN FOR COLON CANCER: ICD-10-CM

## 2025-07-14 DIAGNOSIS — Z00.00 ENCOUNTER FOR ANNUAL HEALTH EXAMINATION: Primary | ICD-10-CM

## 2025-07-14 LAB
ALBUMIN SERPL-MCNC: 4.4 G/DL (ref 3.2–4.8)
ALBUMIN/GLOB SERPL: 1.6 {RATIO} (ref 1–2)
ALP LIVER SERPL-CCNC: 105 U/L (ref 55–142)
ALT SERPL-CCNC: 24 U/L (ref 10–49)
ANION GAP SERPL CALC-SCNC: 10 MMOL/L (ref 0–18)
AST SERPL-CCNC: 30 U/L (ref ?–34)
BASOPHILS # BLD AUTO: 0.06 X10(3) UL (ref 0–0.2)
BASOPHILS NFR BLD AUTO: 0.9 %
BILIRUB SERPL-MCNC: 0.6 MG/DL (ref 0.2–1.1)
BUN BLD-MCNC: 11 MG/DL (ref 9–23)
CALCIUM BLD-MCNC: 9.7 MG/DL (ref 8.7–10.6)
CHLORIDE SERPL-SCNC: 98 MMOL/L (ref 98–112)
CO2 SERPL-SCNC: 26 MMOL/L (ref 21–32)
CREAT BLD-MCNC: 0.89 MG/DL (ref 0.55–1.02)
EGFRCR SERPLBLD CKD-EPI 2021: 68 ML/MIN/1.73M2 (ref 60–?)
EOSINOPHIL # BLD AUTO: 0.07 X10(3) UL (ref 0–0.7)
EOSINOPHIL NFR BLD AUTO: 1 %
ERYTHROCYTE [DISTWIDTH] IN BLOOD BY AUTOMATED COUNT: 13.5 %
FASTING STATUS PATIENT QL REPORTED: NO
GLOBULIN PLAS-MCNC: 2.8 G/DL (ref 2–3.5)
GLUCOSE BLD-MCNC: 105 MG/DL (ref 70–99)
HCT VFR BLD AUTO: 39.7 % (ref 35–48)
HGB BLD-MCNC: 14 G/DL (ref 12–16)
IMM GRANULOCYTES # BLD AUTO: 0.04 X10(3) UL (ref 0–1)
IMM GRANULOCYTES NFR BLD: 0.6 %
LYMPHOCYTES # BLD AUTO: 0.91 X10(3) UL (ref 1–4)
LYMPHOCYTES NFR BLD AUTO: 13 %
MCH RBC QN AUTO: 32.9 PG (ref 26–34)
MCHC RBC AUTO-ENTMCNC: 35.3 G/DL (ref 31–37)
MCV RBC AUTO: 93.2 FL (ref 80–100)
MONOCYTES # BLD AUTO: 0.54 X10(3) UL (ref 0.1–1)
MONOCYTES NFR BLD AUTO: 7.7 %
NEUTROPHILS # BLD AUTO: 5.4 X10 (3) UL (ref 1.5–7.7)
NEUTROPHILS # BLD AUTO: 5.4 X10(3) UL (ref 1.5–7.7)
NEUTROPHILS NFR BLD AUTO: 76.8 %
OSMOLALITY SERPL CALC.SUM OF ELEC: 278 MOSM/KG (ref 275–295)
PLATELET # BLD AUTO: 255 10(3)UL (ref 150–450)
POTASSIUM SERPL-SCNC: 4.6 MMOL/L (ref 3.5–5.1)
PROT SERPL-MCNC: 7.2 G/DL (ref 5.7–8.2)
RBC # BLD AUTO: 4.26 X10(6)UL (ref 3.8–5.3)
SODIUM SERPL-SCNC: 134 MMOL/L (ref 136–145)
TSI SER-ACNC: 1.6 UIU/ML (ref 0.55–4.78)
WBC # BLD AUTO: 7 X10(3) UL (ref 4–11)

## 2025-07-14 PROCEDURE — 80053 COMPREHEN METABOLIC PANEL: CPT | Performed by: FAMILY MEDICINE

## 2025-07-14 PROCEDURE — 85025 COMPLETE CBC W/AUTO DIFF WBC: CPT | Performed by: FAMILY MEDICINE

## 2025-07-14 PROCEDURE — 84550 ASSAY OF BLOOD/URIC ACID: CPT | Performed by: FAMILY MEDICINE

## 2025-07-14 PROCEDURE — 84443 ASSAY THYROID STIM HORMONE: CPT | Performed by: FAMILY MEDICINE

## 2025-07-14 PROCEDURE — 83036 HEMOGLOBIN GLYCOSYLATED A1C: CPT | Performed by: FAMILY MEDICINE

## 2025-07-14 RX ORDER — AMLODIPINE AND OLMESARTAN MEDOXOMIL 5; 40 MG/1; MG/1
1 TABLET ORAL DAILY
Qty: 90 TABLET | Refills: 1 | Status: SHIPPED | OUTPATIENT
Start: 2025-07-14

## 2025-07-14 RX ORDER — NAPROXEN 500 MG/1
250 TABLET ORAL 2 TIMES DAILY PRN
Qty: 60 TABLET | Refills: 0 | Status: SHIPPED | OUTPATIENT
Start: 2025-07-14

## 2025-07-14 NOTE — PROGRESS NOTES
Subjective:   Ciarra Sethi is a 74 year old female who presents for a Subsequent Annual Wellness visit (Pt already had Initial Annual Wellness) and scheduled follow up of multiple significant but stable problems.       Patient presents for recheck of hypertension. Pt has been taking medications as instructed, . denies any medication side effects,. Patient is a  s Home blood [id number]'s over 70s to 80s.  Denies any headache, dizziness, chest pain, shortness of breath or leg swelling.     History of prediabetes-not on medication.  Trying to watch diet and eat low-carb.  Denies polyuria, polydipsia or vision changes.  Gained 5 pounds back off of semaglutide.  Will be restarting semaglutide  Last A1c:  HgbA1C (%)   Date Value   12/19/2024 5.1   07/09/2024 4.8   10/27/2023 5.2       Renal mass-completed MRI on 3/29/2023 benign proteinaceous cyst.       Fatty liver-- Lost 30 pounds but gained 5# back since 4/2023 per patient.  Restarting semaglutide.  Working with [facility] at weight loss clinic.  Trying to follow Mediterranean diet, stopped processed food lunchmeat, stop snacking.  Making healthy lifestyle changes. Denies abdominal pain.     History of lumbar back fusion of L3/L4 by Dr. Cisneros on 5/19/2022-currently has no back pain.  Denies radiation of pain down the leg or up the back.  Denies weakness or numbness of the arms or legs.  Denies any bowel bladder incontinence or perianal anesthesia.  Uses cane if back pain flares but currently not needing cane.  Gait stable.       Risk of falls-patient worries during winter months if I see and falls-will injure her knee and back and will use cane if back pain.  Reports her gait is steady.  Does not feel that she is unsteady when walking..  Last DEXA scan in 2024 was completely normal.  No history of fractures or falls.     Hx of OA in fingers left PIP 3rd and 4th finger swollen-uses naproxen as needed and uses sparingly due to risk of  GI side effects.. Unable to wear rings. Tylenol arthritis not helping.  Has appointment with rheumatology 7/17/2025 Dr. Mcgarry.  On colchicine for gouty arthritis which she feels is controlled.  Also requesting refill of naproxen.  She does use it sparingly and denies any GI side effects.  Denies abdominal pain or GERD       Wt Readings from Last 6 Encounters:   07/14/25 192 lb 6.4 oz (87.3 kg)   06/12/25 191 lb (86.6 kg)   02/24/25 187 lb (84.8 kg)   12/26/24 187 lb (84.8 kg)   12/19/24 186 lb 6.4 oz (84.6 kg)   12/16/24 186 lb (84.4 kg)     Heighest weight: 213#    Health Maintenance   Topic Date Due    Colorectal Cancer Screening  02/05/2023    COVID-19 Vaccine (8 - 2024-25 season) 06/17/2025    Annual Physical  07/09/2025    Influenza Vaccine (1) 10/01/2025    DEXA Scan  normal   7/13/2024 Completed    Annual Depression Screening  Completed    Fall Risk Screening (Annual)  Completed    Pneumococcal Vaccine: 50+ Years  Completed    Zoster Vaccines  Completed    Meningococcal B Vaccine  Aged Out    Mammogram  Discontinued                History/Other:   Fall Risk Assessment:   She has been screened for Falls and is High Risk. Fall Prevention information provided to patient in After Visit Summary.    Do you feel unsteady when standing or walking?: No  Do you worry about falling?: Yes (do to risk of injury)  Have you fallen in the past year?: No    Patient is cautious during the winter and ice this is when she worries about falling.  She currently has no concern no falls.  It is only during inclement weather      Cognitive Assessment:   She had a completely normal cognitive assessment - see flowsheet entries     Functional Ability/Status:   Ciarra Sethi has a completely normal functional assessment. See flowsheet for details.      Depression Screening (PHQ):  PHQ-2 SCORE: 0  , done 7/14/2025   If you checked off any problems, how difficult have these problems made it for you to do your work, take care of things  at home, or get along with other people?: Not difficult at all    Last [place] Suicide Screening on 7/14/2025 was No Risk.         Advanced Directives:   She does NOT have a Living Will. [Do you have a living will?: (Patient-Rptd) No]  She does NOT have a Power of  for Health Care. [Do you have a healthcare power of ?: (Patient-Rptd) No]        Patient Active Problem List   Diagnosis    Essential hypertension, benign    Vitamin D deficiency    Hepatomegaly    Class 1 drug-induced obesity without serious comorbidity with body mass index (BMI) of 32.0 to 32.9 in adult    History of total bilateral knee replacement    Varicosities    Primary osteoarthritis involving multiple joints    Chronic midline low back pain without sciatica    Obesity (BMI 30-39.9)    Hyponatremia    Elevated liver enzymes    MSSA (methicillin-susceptible Staphylococcus aureus) colonization    Dermatitis    Foraminal stenosis of lumbar region    Fatty liver    Right kidney mass    Elevated fasting glucose    At risk for falls    Prediabetes    Ds DNA antibody positive    Gouty arthritis    Primary osteoarthritis of both hands    History of prediabetes     Allergies:  She has no known allergies.    Current Medications:  Outpatient Medications Marked as Taking for the 7/14/25 encounter (Office Visit) with Yeni Beltran, DO   Medication Sig    naproxen 500 MG Oral Tab Take 0.5 tablets (250 mg total) by mouth 2 (two) times daily as needed (for back and hand pain). Take with food and water. Stop and call if GI side effects    amLODIPine-Olmesartan 5-40 MG Oral Tab Take 1 tablet by mouth daily.    semaglutide-weight management (WEGOVY) 0.5 MG/0.5ML Subcutaneous Solution Auto-injector Inject 0.5 mL (0.5 mg total) into the skin once a week.    colchicine 0.6 MG Oral Tab Take 1 tablet (0.6 mg total) by mouth daily.    betamethasone dipropionate 0.05 % External Lotion APPLY TWICE DAILY STRICTLY TO THE AFFECTED AREA FOR 2 WEEKS. AVOID USE  ON FACE AND BODY FOLDS.       Medical History:  She  has a past medical history of Anesthesia complication, Arthritis, Back problem, BENIGN HYPERTENSION (09/15/2007), Essential hypertension (1/2/1999), High blood pressure, motion sickness, Obesity (2/9/2022), Osteoarthritis, PONV (postoperative nausea and vomiting), Unspecified vitamin D deficiency (11/15/2010), and Visual impairment.  Surgical History:  She  has a past surgical history that includes other; other (2012,2013); other (2014,2015); total abdom hysterectomy; hysterectomy (1989); knee replacement surgery (2015); other surgical history (2012,2013); Spinal fusion (05/2022); and back surgery (5/10/2022).   Family History:  Her family history includes Arthritis in her brother, brother, mother, sister, sister, and sister; Cancer in her brother and brother; Cancer (age of onset: 56) in her father; Dementia in her mother; Heart Disease in her mother; Heart Disorder (age of onset: 50) in her mother; High Blood Pressure in her mother; Hypertension in her brother, daughter, sister, and son; No Known Problems in her maternal grandfather, maternal grandmother, paternal grandfather, and paternal grandmother; Psychiatric in her mother; parkinson in her son.  Social History:  She  reports that she has never smoked. She has never been exposed to tobacco smoke. She has never used smokeless tobacco. She reports current alcohol use of about 2.0 standard drinks of alcohol per week. She reports that she does not use drugs.    Tobacco:  She has never smoked tobacco.    CAGE Alcohol Screen:   CAGE screening score of 0 on 7/11/2025, showing low risk of alcohol abuse.      Patient Care Team:  Yeni Beltran DO as PCP - General (Family Medicine)  Kinsey Morales RD (Dietitian)  Dee Farah APRN (Nurse Practitioner Family)  Lucas Pratt RD (Dietitian)  Chacho Jauregui MD (GASTROENTEROLOGY)  Leticia Newberry MD (OBSTETRICS & GYNECOLOGY)  Yuri Martini MD (Surgery,  Orthopaedic)  Gee Mosquera, PT as Physical Therapist  Scarlet Novak, PT as Physical Therapist (Physical Therapy)  Joselyn Torres, PT as Physical Therapist (Physical Therapy)  Lisa Weber as Physical Therapist    Review of Systems  GENERAL: feels well otherwise  SKIN: denies any unusual skin lesions  EYES: denies blurred vision or double vision  HEENT: denies nasal congestion, sinus pain or ST  LUNGS: denies shortness of breath with exertion  CARDIOVASCULAR: denies chest pain on exertion  GI: denies abdominal pain, denies heartburn  : denies dysuria, vaginal discharge or itching, no complaint of urinary incontinence   MUSCULOSKELETAL: denies back pain  NEURO: denies headaches  PSYCHE: denies depression or anxiety  HEMATOLOGIC: denies hx of anemia  ENDOCRINE: denies thyroid history  ALL/ASTHMA: denies hx of allergy or asthma    Objective:   Physical Exam  General Appearance:  Alert, cooperative, no distress, appears stated age   Head:  Normocephalic, without obvious abnormality, atraumatic   Eyes:  PERRL, conjunctiva/corneas clear, EOM's intact both eyes   Ears:  Normal TM's and external ear canals, both ears   Nose: Nares normal, septum midline,mucosa normal, no drainage or sinus tenderness   Throat: Lips, mucosa, and tongue normal; teeth and gums normal   Neck: Supple, symmetrical, trachea midline, no adenopathy;  thyroid: not enlarged, symmetric, no tenderness/mass/nodules; no carotid bruit or JVD   Back:   Symmetric, no curvature, ROM normal, no CVA tenderness   Lungs:   Clear to auscultation bilaterally, respirations unlabored   Heart:  Regular rate and rhythm, S1 and S2 normal, no murmur, rub, or gallop   Abdomen:   Soft, non-tender, bowel sounds active all four quadrants,  no masses, no organomegaly   Pelvic: Deferred   Extremities: Extremities normal, atraumatic, no cyanosis or edema   Pulses: 2+ and symmetric   Skin: Skin color, texture, turgor normal, no rashes or lesions   Lymph nodes: Cervical,  supraclavicular, and axillary nodes normal   Neurologic: Normal       /60 (BP Location: Left arm, Patient Position: Sitting, Cuff Size: adult)   Pulse 95   Temp 97.3 °F (36.3 °C) (Temporal)   Resp 21   Ht 5' 4.4\" (1.636 m)   Wt 192 lb 6.4 oz (87.3 kg)   SpO2 98%   BMI 32.62 kg/m²  Estimated body mass index is 32.62 kg/m² as calculated from the following:    Height as of this encounter: 5' 4.4\" (1.636 m).    Weight as of this encounter: 192 lb 6.4 oz (87.3 kg).    Medicare Hearing Assessment:   Hearing Screening    Time taken: 7/14/2025 12:41 PM  Entry User: Diana Sheppard CMA  Screening Method: Finger Rub  Finger Rub Result: Pass         Visual Acuity:   Right Eye Visual Acuity: Corrected Right Eye Chart Acuity: 20/40   Left Eye Visual Acuity: Corrected Left Eye Chart Acuity: 20/40   Both Eyes Visual Acuity: Corrected Both Eyes Chart Acuity: 20/40   Able To Tolerate Visual Acuity: Yes        Assessment & Plan:   Ciarra Sethi is a 74 year old female who presents for a Medicare Assessment.     1. Encounter for annual health examination (Primary)  -     Detailed, Mod Complex (71186)  2. Primary osteoarthritis of both hands  -     Naproxen; Take 0.5 tablets (250 mg total) by mouth 2 (two) times daily as needed (for back and hand pain). Take with food and water. Stop and call if GI side effects  Dispense: 60 tablet; Refill: 0  -     Detailed, Mod Complex (26407)  -     CBC With Differential With Platelet; Future; Expected date: 07/14/2025  -     CBC With Differential With Platelet  3. Essential hypertension, benign  -     amLODIPine-Olmesartan; Take 1 tablet by mouth daily.  Dispense: 90 tablet; Refill: 1  -     Detailed, Mod Complex (57887)  -     CBC With Differential With Platelet; Future; Expected date: 07/14/2025  -     Comp Metabolic Panel (14); Future; Expected date: 07/14/2025  -     CBC With Differential With Platelet  -     Comp Metabolic Panel (14)  -     Comp Metabolic Panel (14); Future;  Expected date: 01/14/2026  Controlled  Continue amlodipine olmesartan  4. Elevated fasting glucose  5. History of prediabetes  -     Detailed, Mod Complex (30959)  -     Hemoglobin A1C; Future; Expected date: 07/14/2025  -     Hemoglobin A1C  6. Fatty liver  -     Detailed, Mod Complex (32282)  -     Comp Metabolic Panel (14); Future; Expected date: 07/14/2025  -     Comp Metabolic Panel (14)  -     Comp Metabolic Panel (14); Future; Expected date: 01/14/2026  7. Elevated liver enzymes  -     Detailed, Mod Complex (32820)  -     Comp Metabolic Panel (14); Future; Expected date: 07/14/2025  -     Comp Metabolic Panel (14)  -     Comp Metabolic Panel (14); Future; Expected date: 01/14/2026  8. At high risk for falls  9. Gouty arthritis  Overview:  Hx of OA in fingers left PIP 3rd and 4th finger swollen-uses naproxen as needed and uses sparingly due to risk of GI side effects.. Unable to wear rings. Tylenol arthritis not helping.  Has appointment with rheumatology 7/26/2024, Dr. Mcgarry-suspected gouty arthritis.  Patient was started on allopurinol prednisone and colchicine for 6 to 12 months.  Patient's pain in her fingers has improved.  Gouty arthritis was confirmed with CT of left hand on 8/9/2024 there was uric acid deposition consistent with gout most pronounced in the 2nd through 4th MCP joints of the fourth PIP joint.  There is also secondary OA most pronounced in the second MCP joint and the fourth PIP joint.  Orders:  -     Detailed, Mod Complex (06280)  10. Primary osteoarthritis involving multiple joints  -     Detailed, Mod Complex (71350)  -     CBC With Differential With Platelet; Future; Expected date: 07/14/2025  -     CBC With Differential With Platelet  11. Weight gain  -     TSH W Reflex To Free T4; Future; Expected date: 07/14/2025  -     TSH W Reflex To Free T4  Continue working with weight loss clinic, [facility] on weight loss management  12. Screen for colon cancer  -     Gastro  Referral - In Network  -     Detailed, Mod Complex (13244)  Schedule colonoscopy with Dr. Jauregui    Assessment & Plan  Weight gain  She has gained approximately 5 pounds since February 2025, currently weighing 187 pounds. Previously, she weighed 213 pounds and has been actively working on weight loss. She is using Wegovy for weight management and plans to engage in pool exercises at [facility].  - Continue Wegovy for weight management  - Engage in pool exercises at [facility]    Fatty liver  Weight management is crucial for her fatty liver, and her weight loss efforts are beneficial.  -Needs hep A and B vaccine  -Consider ultrasound elastography of the liver  - Encourage continued weight management efforts which is treatment of choice  - Monitor CMP liver function    Primary osteoarthritis of both hands  Her arthritis, particularly in her hands, is problematic. She manages it by resting when sore and using a cane if necessary. She takes naproxen sparingly for pain without gastrointestinal side effects and is on colchicine as prescribed by Dr. Mcgarry, with a follow-up scheduled at the end of the month.  - Refill naproxen prescription  - Continue colchicine as prescribed by Dr. Mcgarry  - Follow up with Dr. Mcgarry at the end of the month    Essential hypertension  She requires a refill of her antihypertensive medication. Blood pressure management is ongoing, with a follow-up needed in six months.  - Refill antihypertensive medication  - Follow up in six months for blood pressure management    Prediabetes  She has prediabetes and plans to monitor her A1c levels to assess glucose control.  Encouraged low-carb diet less than 100 g daily.  Walk 30 minutes daily.  Continue working on weight loss.  Restarting semaglutide.  - Order A1c test to monitor glucose control    General Health Maintenance  She has completed her mammogram and vision check. She is due for colon cancer screening and is considering Dr. Jauregui for this. Her  cholesterol levels are excellent without medication. She has advanced directives in place and is cautious about falls due to her medical history.  - Consider colon cancer screening with Dr. Jauregui  - Continue regular mammograms and self-breast exams and annual mammogram  - Monitor for falls and use a cane for stability on icy surfaces    Follow-up  She will need to return in six months to discuss blood pressure and lab results.  - Schedule follow-up appointment in six months    The patient indicates understanding of these issues and agrees to the plan.  Lab work ordered.  Prescription medication ordered.  Reinforced healthy diet, lifestyle, and exercise.      Return in about 6 months (around 1/14/2026) for blood pressure, discuss labs, sooner if needed..     Yeni Beltran, , 7/14/2025     Supplementary Documentation:   General Health:  In the past six months, have you lost more than 10 pounds without trying?: (Patient-Rptd) 2 - No  Has your appetite been poor?: (Patient-Rptd) No  Type of Diet: (Patient-Rptd) Balanced  How does the patient maintain a good energy level?: (Patient-Rptd) Other  How would you describe your daily physical activity?: (Patient-Rptd) Light  How would you describe your current health state?: (Patient-Rptd) Good  How do you maintain positive mental well-being?: (Patient-Rptd) Social Interaction, Visiting Friends, Visiting Family  On a scale of 0 to 10, with 0 being no pain and 10 being severe pain, what is your pain level?: (Patient-Rptd) 2 - (Mild)  In the past six months, have you experienced urine leakage?: (Patient-Rptd) 0-No  At any time do you feel concerned for the safety/well-being of yourself and/or your children, in your home or elsewhere?: (Patient-Rptd) No  Have you had any immunizations at another office such as Influenza, Hepatitis B, Tetanus, or Pneumococcal?: (Patient-Rptd) No    Health Maintenance   Topic Date Due    Colorectal Cancer Screening  02/05/2023    COVID-19 Vaccine  (8 - 2024-25 season) 06/17/2025    Annual Physical  07/09/2025    Influenza Vaccine (1) 10/01/2025    DEXA Scan  Completed    Annual Depression Screening  Completed    Fall Risk Screening (Annual)  Completed    Pneumococcal Vaccine: 50+ Years  Completed    Zoster Vaccines  Completed    Meningococcal B Vaccine  Aged Out    Mammogram  Discontinued

## 2025-07-14 NOTE — PROGRESS NOTES
The following individual(s) verbally consented to be recorded using ambient AI listening technology and understand that they can each withdraw their consent to this listening technology at any point by asking the clinician to turn off or pause the recording:    Patient name: Ciarra Sethi  Additional names:  MORGAN

## 2025-07-14 NOTE — PROGRESS NOTES
Patient came in for draw of ordered fasting labs. Patient drawn out of right AC, x 1 attempt and tolerated well.  SST (mint green) AND LAVENDER tube drawn.

## 2025-07-15 LAB
EST. AVERAGE GLUCOSE BLD GHB EST-MCNC: 100 MG/DL (ref 68–126)
HBA1C MFR BLD: 5.1 % (ref ?–5.7)

## 2025-07-16 ENCOUNTER — TELEPHONE (OUTPATIENT)
Facility: CLINIC | Age: 75
End: 2025-07-16

## 2025-07-16 DIAGNOSIS — M1A.09X1 IDIOPATHIC CHRONIC GOUT OF MULTIPLE SITES WITH TOPHUS: Primary | ICD-10-CM

## 2025-07-16 LAB — URATE SERPL-MCNC: 4.8 MG/DL (ref 3.1–7.8)

## 2025-07-17 ENCOUNTER — OFFICE VISIT (OUTPATIENT)
Dept: RHEUMATOLOGY | Facility: CLINIC | Age: 75
End: 2025-07-17
Payer: MEDICARE

## 2025-07-17 VITALS
BODY MASS INDEX: 31.99 KG/M2 | SYSTOLIC BLOOD PRESSURE: 132 MMHG | TEMPERATURE: 98 F | HEIGHT: 65 IN | WEIGHT: 192 LBS | HEART RATE: 90 BPM | DIASTOLIC BLOOD PRESSURE: 60 MMHG | RESPIRATION RATE: 16 BRPM | OXYGEN SATURATION: 98 %

## 2025-07-17 DIAGNOSIS — M1A.00X1 IDIOPATHIC CHRONIC GOUT WITH TOPHUS, UNSPECIFIED SITE: ICD-10-CM

## 2025-07-17 DIAGNOSIS — M1A.09X1 IDIOPATHIC CHRONIC GOUT OF MULTIPLE SITES WITH TOPHUS: Primary | ICD-10-CM

## 2025-07-17 DIAGNOSIS — R76.8 DS DNA ANTIBODY POSITIVE: ICD-10-CM

## 2025-07-17 PROCEDURE — 99214 OFFICE O/P EST MOD 30 MIN: CPT | Performed by: INTERNAL MEDICINE

## 2025-07-17 RX ORDER — ALLOPURINOL 300 MG/1
300 TABLET ORAL DAILY
Qty: 90 TABLET | Refills: 3 | Status: SHIPPED | OUTPATIENT
Start: 2025-07-17 | End: 2025-07-17

## 2025-07-17 RX ORDER — COLCHICINE 0.6 MG/1
0.6 TABLET ORAL DAILY
Qty: 90 TABLET | Refills: 1 | Status: SHIPPED | OUTPATIENT
Start: 2025-07-17

## 2025-07-17 RX ORDER — ALLOPURINOL 300 MG/1
450 TABLET ORAL DAILY
Qty: 135 TABLET | Refills: 3 | Status: SHIPPED | OUTPATIENT
Start: 2025-07-17

## 2025-07-17 RX ORDER — ALLOPURINOL 300 MG/1
300 TABLET ORAL DAILY
COMMUNITY
End: 2025-07-17

## 2025-07-17 RX ORDER — PREDNISONE 5 MG/1
TABLET ORAL
Qty: 32 TABLET | Refills: 1 | Status: SHIPPED | OUTPATIENT
Start: 2025-07-17 | End: 2025-07-27

## 2025-07-17 NOTE — PROGRESS NOTES
The following individual(s) verbally consented to be recorded using ambient AI listening technology and understand that they can each withdraw their consent to this listening technology at any point by asking the clinician to turn off or pause the recording:    Patient name: Ciarra Sethi  Additional names:

## 2025-07-17 NOTE — PROGRESS NOTES
Rheumatology f/u Patient Note  =====================================================================================================    Chief complaint: gout  Chief Complaint   Patient presents with    Gout     6 month f/u. Feeling a bit better. Had stopped medication for about 4 weeks and noticed increase in symptoms. Went back on a few days ago. No gout flares prior to stopping medication. Converted rapid score of 0.3     PCP  Yeni Beltran DO  Fax: [phone]  Phone: [phone]    =====================================================================================================  HPI   Date of visit: 7/23/2024    Ciarra Sethi is a 74 year old female     Here for evaluation of polyarthralgia in the setting of a + dsDNA antibody.  Hx of lumbar spinal fusion. And history of bilateral TKA  -more hand pain recently.  Long history of chronic hand pain.  Diagnoses osteoarthritis in the past.  Hard to make a full fist.  Pain with typing.  Hx of dermatitis. Very itching skin.  Mostly in the left lower extremity.  No biopsy in the past. Using steroid creams which somewhat help.  Denies current alopecia, malar rash, photosensitivity rash, discoid lesions, oral/nasal ulcers, pleuritic chest pain,  seizures/psychosis, Raynaud's, dry eyes/mouth, or blood clots.  Denies miscarriages or obstetric events (early pre-eclampsia, IUGR, placental insufficiency)  -Prior pregnancies and/or children: 3 children.  --Pregnancy complications: no  -no miscarriages  Takes naproxen prn.   History of gout in pregnancy. Son with gout.    struggling with CAD s/p CABG in aftermath due to this.  ==============================================================================================================  Visit: 12/26/24  Doing much better since last visit.  Escalated allopurinol to 300 mg daily, was taking colchicine 0.6 mg daily.  Ran out of the medications about a month ago.  Fingers improved in terms of pain and the swelling  went down quite a bit.  She was able to put on rings that she could not put on for quite some time, these started to fit again.  Denies any side effects from the medications.  No rashes or diarrhea.  -No new symptoms  ==============================================================================================================  Today's Visit: 07/17/25    She experienced a flare-up of gout symptoms after self-discontinuing allopurinol and colchicine for three to four weeks. She has resumed taking allopurinol and colchicine, with improvement of symptoms. She experiences pain and swelling in her fingers, making it difficult to type and remove rings. The pain is described as cramping, and she notes that it sometimes occurs while typing.    She has a history of arthritis with finger deformities, which she attributes to either gout or osteoarthritis. Since starting medication, she notes significant improvement in her symptoms, estimating an 80% improvement.    Medications:  Current Outpatient Medications on File Prior to Visit   Medication Sig Dispense Refill    naproxen 500 MG Oral Tab Take 0.5 tablets (250 mg total) by mouth 2 (two) times daily as needed (for back and hand pain). Take with food and water. Stop and call if GI side effects 60 tablet 0    amLODIPine-Olmesartan 5-40 MG Oral Tab Take 1 tablet by mouth daily. 90 tablet 1    semaglutide-weight management (WEGOVY) 0.5 MG/0.5ML Subcutaneous Solution Auto-injector Inject 0.5 mL (0.5 mg total) into the skin once a week. 4 each 1    betamethasone dipropionate 0.05 % External Lotion APPLY TWICE DAILY STRICTLY TO THE AFFECTED AREA FOR 2 WEEKS. AVOID USE ON FACE AND BODY FOLDS.       No current facility-administered medications on file prior to visit.     ?  Allergies:  No Known Allergies    Objective    Vitals:    07/17/25 0926   BP: 132/60   Pulse: 90   Resp: 16   Temp: 97.7 °F (36.5 °C)   SpO2: 98%   Weight: 192 lb (87.1 kg)   Height: 5' 5\" (1.651 m)     GEN: JANICE,  well-nourished.   HEENT: Head: NCAT. Face: No lesions. Eyes: Conjunctiva clear.   PULM:  easy effort  Extremities: No cyanosis, edema or deformities.   Neurologic: Strength, CN2-12 grossly intact   Skin: No lesions or rashes.  MSK: 28 joint count performed. No evidence of synovitis in mcp, pip, dip, wrist, elbows, shoulders, hips, knees, ankles, mtp unless otherwise noted. Full ROM of elbows, wrists, knees.     Tophaceous gout thickening of the MCPs stable.  PIP/DIP tophi/effusions softer, no tender joints today    Labs:  7/2024  Uric acid 5.5   lupus anticoagulant, anticardiolipin IgG/IgM, beta-2 glycoprotein IgG/IgM negative  dsDNA 16.6, dsDNA by IFA neg  C3/C4 wnl    12/2023  dsDNA 15.7  Uric acid 4.5  Rf/ccp neg    Lab Results   Component Value Date    URIC 4.8 07/14/2025    URIC 5.5 07/24/2024    URIC 4.5 12/15/2023    URIC 5.0 05/08/2019         Lab Results   Component Value Date    WBC 7.0 07/14/2025    RBC 4.26 07/14/2025    HGB 14.0 07/14/2025    HCT 39.7 07/14/2025    .0 07/14/2025    MCV 93.2 07/14/2025    MCH 32.9 07/14/2025    MCHC 35.3 07/14/2025    RDW 13.5 07/14/2025    NEPRELIM 5.40 07/14/2025    NEPERCENT 76.8 07/14/2025    LYPERCENT 13.0 07/14/2025    MOPERCENT 7.7 07/14/2025    EOPERCENT 1.0 07/14/2025    BAPERCENT 0.9 07/14/2025    NE 5.40 07/14/2025    LYMABS 0.91 (L) 07/14/2025    MOABSO 0.54 07/14/2025    EOABSO 0.07 07/14/2025    BAABSO 0.06 07/14/2025     Lab Results   Component Value Date     (H) 07/14/2025    BUN 11 07/14/2025    BUNCREA 14.6 03/05/2021    CREATSERUM 0.89 07/14/2025    ANIONGAP 10 07/14/2025    GFRNAA 84 07/01/2022    GFRAA 97 07/01/2022    CA 9.7 07/14/2025    OSMOCALC 278 07/14/2025    ALKPHO 105 07/14/2025    AST 30 07/14/2025    ALT 24 07/14/2025    BILT 0.6 07/14/2025    TP 7.2 07/14/2025    ALB 4.4 07/14/2025    GLOBULIN 2.8 07/14/2025    AGRATIO 1.7 05/16/2013     (L) 07/14/2025    K 4.6 07/14/2025    CL 98 07/14/2025    CO2 26.0 07/14/2025          No results found for: \"ANATI\", \"DRISS\", \"ANAS\", \"ANASCRN\", \"ANASCRNRFLX\", \"CHONG\"  No results found for: \"SSA\", \"SSAUR\", \"ANTISSA\", \"SSA52\", \"SSA60\", \"SSADD\", \"SSB\", \"ANTISSB\"  Lab Results   Component Value Date    DSDNA <10 07/24/2024     No results found for: \"SCL70\", \"SCL\", \"ANIRITB77\"  Lab Results   Component Value Date    C3 118.0 07/24/2024    C4 25.7 07/24/2024     Lab Results   Component Value Date    DRVVT Negative 07/24/2024    [id number] 1.3 07/24/2024    [id number] <2.4 07/24/2024     Lab Results   Component Value Date    CARDIOLIPIGG 1.8 07/24/2024    CARDIOLIPIGM 2.4 07/24/2024       Additional Labs:    Radiology:  2019: XR hands c/w OA    Radiology review:      =====================================================================================================  Assessment and Plan  Assessment:  1. Idiopathic chronic gout of multiple sites with tophus    2. Ds DNA antibody positive    3. 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   -Likely the cause of significant osteochondral hypertrophy and swelling of the small joints of the hands  -She also has overlapping erosive osteoarthritis  -Notes she had gout in pregnancy, son with gout as well  -Significant improvement in pain and tophaceous swelling after starting urate lowering therapy and colchicine. 80% better overall since the start of therapy in terms of pain and swelling.  -had a flare of gout after self-discontinuing allopurinol and colchicine  - Now better with restarting allopurinol and colchicine.    #Chronic musculoskeletal pain, osteoarthritis: S/p bilateral TKA, lumbar fusion    #Elevated dsDNA antibody but no clinical features at this point that I can directly tied to systemic autoimmune disease such as systemic lupus erythematosus.  -Chronic left shin rash: Biopsy consistent with venous stasis  ?  Plan:  - Long discussion with the patient today.   Discussed that gout is a chronic disorder.  If she stops the urate lowering therapy the gout will just come back and cause significant symptoms as she experienced.  Recommend long term adherence to allopurinol.  Provide another 6 months of colchicine and then consider stopping  - Increase allopurinol from 300 mg daily to 450 mg daily to more aggressively treat gout given persistent tophi, colchicine 0.6 mg daily x 6 months (then consider stopping)  -Elevated dsDNA, recheck this via EIA and IFA technique in 6 months along with uric acid and CK and comprehensive metabolic panel (CMP)    Prednisone 30 mg taper for gout flares    Rtc 6 months      Diagnoses and all orders for this visit:    Idiopathic chronic gout of multiple sites with tophus  -     Discontinue: allopurinol 300 MG Oral Tab; Take 1 tablet (300 mg total) by mouth daily.  -     colchicine 0.6 MG Oral Tab; Take 1 tablet (0.6 mg total) by mouth daily.  -     Comp Metabolic Panel (14); Future  -     CBC With Differential With Platelet; Future  -     Uric Acid; Future  -     CK (Creatine Kinase) (Not Creatinine); Future  -     DNA (DS) ANTIBODY; Future  -     dsDNA Antibody by IFA [E]; Future  -     allopurinol 300 MG Oral Tab; Take 1.5 tablets (450 mg total) by mouth daily.    Ds DNA antibody positive  -     Discontinue: allopurinol 300 MG Oral Tab; Take 1 tablet (300 mg total) by mouth daily.  -     colchicine 0.6 MG Oral Tab; Take 1 tablet (0.6 mg total) by mouth daily.  -     Comp Metabolic Panel (14); Future  -     CBC With Differential With Platelet; Future  -     Uric Acid; Future  -     CK (Creatine Kinase) (Not Creatinine); Future  -     DNA (DS) ANTIBODY; Future  -     dsDNA Antibody by IFA [E]; Future  -     allopurinol 300 MG Oral Tab; Take 1.5 tablets (450 mg total) by mouth daily.    Idiopathic chronic gout with tophus, unspecified site    Other orders  -     predniSONE 5 MG Oral Tab; Take 6 tablets (30 mg total) by mouth daily for 2 days, THEN  4 tablets (20 mg total) daily for 2 days, THEN 3 tablets (15 mg total) daily for 2 days, THEN 2 tablets (10 mg total) daily for 2 days, THEN 1 tablet (5 mg total) daily for 2 days. For gout flares.          Return in about 8 months (around 3/17/2026).      The above plan of care, diagnosis, orders, and follow-up were discussed with the patient. Questions related to this recommended plan of care were answered.    Thank you for referring this delightful patient to me. Please feel free to contact me with any questions.     This report was performed utilizing speech recognition software technology. Despite proofreading, speech recognition errors could escape detection. If a word or phrase is confusing or out of context, please do not hesitate to call for   clarification.       Kind regards      Slade Mcgarry MD  [facility]

## 2025-08-01 ENCOUNTER — PATIENT MESSAGE (OUTPATIENT)
Dept: INTERNAL MEDICINE CLINIC | Facility: CLINIC | Age: 75
End: 2025-08-01

## 2025-08-01 DIAGNOSIS — E66.811 OBESITY (BMI 30.0-34.9): Primary | ICD-10-CM

## 2025-08-01 DIAGNOSIS — I10 ESSENTIAL HYPERTENSION: ICD-10-CM

## 2025-08-01 DIAGNOSIS — I10 ESSENTIAL HYPERTENSION, BENIGN: ICD-10-CM

## 2025-08-21 ENCOUNTER — TELEPHONE (OUTPATIENT)
Dept: DERMATOLOGY | Age: 75
End: 2025-08-21

## 2025-08-21 ENCOUNTER — NURSE ONLY (OUTPATIENT)
Dept: DERMATOLOGY | Age: 75
End: 2025-08-21

## 2025-08-21 DIAGNOSIS — L30.9 ECZEMA, UNSPECIFIED TYPE: Primary | ICD-10-CM

## 2025-08-21 RX ORDER — TRIAMCINOLONE ACETONIDE 1 MG/G
CREAM TOPICAL
Qty: 454 G | Refills: 0 | Status: SHIPPED | OUTPATIENT
Start: 2025-08-21

## 2025-08-21 RX ADMIN — TRIAMCINOLONE ACETONIDE 60 MG: 40 INJECTION, SUSPENSION INTRA-ARTICULAR; INTRAMUSCULAR at 11:11

## 2025-11-26 ENCOUNTER — APPOINTMENT (OUTPATIENT)
Dept: DERMATOLOGY | Age: 75
End: 2025-11-26

## (undated) DIAGNOSIS — A49.01 MSSA (METHICILLIN SUSCEPTIBLE STAPHYLOCOCCUS AUREUS): Primary | ICD-10-CM

## (undated) DIAGNOSIS — M48.062 SPINAL STENOSIS OF LUMBAR REGION WITH NEUROGENIC CLAUDICATION: Primary | ICD-10-CM

## (undated) DIAGNOSIS — R82.90 URINE ABNORMALITY: ICD-10-CM

## (undated) DIAGNOSIS — Z01.818 PREOP EXAMINATION: Primary | ICD-10-CM

## (undated) DEVICE — 3M(TM) TEGADERM(TM) TRANSPARENT FILM DRESSING FRAME STYLE 1628: Brand: 3M™ TEGADERM™

## (undated) DEVICE — SLEEVE KENDALL SCD EXPRESS MED

## (undated) DEVICE — WRAP COOLING BACK W/NO PILLOW

## (undated) DEVICE — Device

## (undated) DEVICE — LIF ILLUMINATION SYSTEM ENDOSC

## (undated) DEVICE — SYRINGE 3ML LL TIP

## (undated) DEVICE — 3M™ TEGADERM™ TRANSPARENT FILM DRESSING, 1626W, 4 IN X 4-3/4 IN (10 CM X 12 CM), 50 EACH/CARTON, 4 CARTON/CASE: Brand: 3M™ TEGADERM™

## (undated) DEVICE — LAMINECTOMY CDS: Brand: MEDLINE INDUSTRIES, INC.

## (undated) DEVICE — GOWN AERO CHROME XXL

## (undated) DEVICE — GLOVE SURG SENSICARE SZ 8

## (undated) DEVICE — K-WIRE

## (undated) DEVICE — 3.0MM PRECISION NEURO (MATCH HEAD)

## (undated) DEVICE — CHLORAPREP 26ML APPLICATOR

## (undated) DEVICE — SUT VICRYL 2-0 FSL J589H

## (undated) DEVICE — KIT ELEC MULTIMOD IOM STR LF

## (undated) DEVICE — SUT VICRYL 1 OS-6 J535H

## (undated) DEVICE — FLOSEAL HEMOSTATIC MATRIX, 5ML: Brand: FLOSEAL HEMOSTATIC MATRIX

## (undated) DEVICE — SOLUTION  .9 1000ML BTL

## (undated) DEVICE — PEN SKIN MARKING REG TIP VIOLT

## (undated) DEVICE — DERMA+FLEX TISSUE ADHESIVE

## (undated) DEVICE — C-ARM: Brand: UNBRANDED

## (undated) DEVICE — TROCAR TIP NITINOL GUIDEWIRE 18": Brand: INVICTUS

## (undated) DEVICE — ARCUS GUIDED ACCESS SYSTEM - BEVEL TIP, STERILE: Brand: ARCUS

## (undated) DEVICE — C-ARMOR C-ARM EQUIPMENT COVERS CLEAR STERILE UNIVERSAL FIT 12 PER CASE: Brand: C-ARMOR

## (undated) DEVICE — COVER,TABLE,44X90,STERILE: Brand: MEDLINE

## (undated) DEVICE — 3M™ MICROFOAM™ TAPE 1528-4: Brand: 3M™ MICROFOAM™

## (undated) DEVICE — Device: Brand: INTELLICART™

## (undated) DEVICE — LIGHT HANDLE

## (undated) DEVICE — 450 ML BOTTLE OF 0.05% CHLORHEXIDINE GLUCONATE IN 99.95% STERILE WATER FOR IRRIGATION, USP AND APPLICATOR.: Brand: IRRISEPT ANTIMICROBIAL WOUND LAVAGE

## (undated) DEVICE — UNDYED BRAIDED (POLYGLACTIN 910), SYNTHETIC ABSORBABLE SUTURE: Brand: COATED VICRYL

## (undated) NOTE — LETTER
Patient Name: Judy Graham  YOB: 1950          MRN :  [id number]  Date:  7/10/2023  Referring Physician:  Vini. Progress Summary  Pt has attended 10 visits in Physical Therapy. Subjective: R low back and buttock pain is rated 7/10 when walking into the clinic this afternoon. Has been more painful this past week, but is not sure why. It is a 0/10 when sitting currently. Notes being able to walk around the store without her cane, and is better able to do more house work, organizing/cleaning her office, and light gardening. Stairs are being negotiated reciprocally. Report standing tolerance of 30-45 minutes. Dressing and bathing are reportedly normal.    HEP is going well. Notes not taking her pain medication recently. Pain: 0-7/10    Objective:   Sit to standing with some UE assist.    Trendelenberg deviation noted while ambulating, especially during right stance phase, and excessive pronation left foot/LE noted. TL ROM standing:  Flexion 65. Extension 5. Strength of hip abduction:  R 2+/5. L 4-/5. Hip flexion has improved to 4- to 4/5. Hip flexion PROM is now 115 degrees bilaterally. 6\" Anterior step-ups are weaker on R and do require some UE assist.     Assessment:  Is reportedly more functional overall. Weakness noted right > left gluteus medius muscle; but marginally improved. Recommend continuing PT. Goals:  (to be met in 10 visits)   Sit to/from standing without UE assist and no pain. Progress. Up/down a flight of stairs reciprocally with railing use prn. .   Met. Pt will report improved tolerance to house work. Progress. Pt report standing tolerance of at least 30 minutes with her ADLs. Met. Pt will be independent with an upgraded HEP. Ongoing, being met. Plan: Continue skilled Physical Therapy 1-2 x/week or a total of 10 additional visits over a 90 day period.    Treatment will continue to include: LE/core strengthening, education, and a functional progression as tolerated and as appropriate. Patient/Family/Caregiver was advised of these findings, precautions, and treatment options and has agreed to actively participate in planning and for this course of care. Thank you for your referral. If you have any questions, please contact me at Dept: [phone]. Sincerely,  Electronically signed by therapist: Tarah Pittman PT     Physician's certification required:  Yes  Please sign and return to clinic ASAP by fax to [phone]. I certify the need for these services furnished under this plan of treatment and while under my care. X___________________________________________________ Date____________________    Certification From: 0/14/6671  To:10/8/2023            21st Century Cures Act Notice to Patient: Medical documents like this are made available to patients in the interest of transparency. However, be advised this is a medical document and it is intended as axgp-ez-qnuw communication between your medical providers. This medical document may contain abbreviations, assessments, medical data, and results or other terms that are unfamiliar. Medical documents are intended to carry relevant information, facts as evident, and the clinical opinion of the practitioner. As such, this medical document may be written in language that appears blunt or direct. You are encouraged to contact your medical provider and/or [facility] Patient Experience if you have any questions about this medical document.

## (undated) NOTE — Clinical Note
Hi Dr. Catrina Vang and Uriah Renteria! Just wanted to let you both know that Ms. Regis Adler came to me for evaluation of right finger pain/swelling.  I am working her up for RA, but I suspect a crystalline arthropathy or cyst there, will further evaluate with an MRI and

## (undated) NOTE — Clinical Note
Need PA for DECT.  Dual Energy CT (DECT) scan needed to detect gouty arthropathy and tophi of the left hand.  Please talk to Dr. Adkins's or another [facility] radiologist's team to schedule this in the correct scanner.

## (undated) NOTE — LETTER
Patient Name: Saritha Umaña  YOB: 1950          MRN :  [id number]  Date:  7/10/2023  Referring Physician:  Luca Ortega Notice to Patient: Medical documents like this are made available to patients in the interest of transparency. However, be advised this is a medical document and it is intended as cawo-ys-vfwx communication between your medical providers. This medical document may contain abbreviations, assessments, medical data, and results or other terms that are unfamiliar. Medical documents are intended to carry relevant information, facts as evident, and the clinical opinion of the practitioner. As such, this medical document may be written in language that appears blunt or direct. You are encouraged to contact your medical provider and/or [facility] Patient Experience if you have any questions about this medical document.

## (undated) NOTE — LETTER
Tom[address], [place], [address]    Phone: [phone]  Fax: [phone]    TRAVIS: [id number]   [email or web address]     New Prescription  Patient Name: Leonora Esteban YOB: 1950   Patient Address: Take 3 capsules (1 pod) with 16 oz of water 20-30 minutes before lunch and before dinner.     Refills:(choose one) 3  [x]  6  []   9  []  12   []  Other   []    Comments: JEFF:     [x]  Yes   []  No  ICD-10: E66.3                 Prescriber Name: Chinedu Xiong